# Patient Record
Sex: FEMALE | Race: WHITE | NOT HISPANIC OR LATINO | Employment: OTHER | URBAN - METROPOLITAN AREA
[De-identification: names, ages, dates, MRNs, and addresses within clinical notes are randomized per-mention and may not be internally consistent; named-entity substitution may affect disease eponyms.]

---

## 2021-04-08 DIAGNOSIS — Z23 ENCOUNTER FOR IMMUNIZATION: ICD-10-CM

## 2024-03-01 ENCOUNTER — OFFICE VISIT (OUTPATIENT)
Dept: URGENT CARE | Facility: CLINIC | Age: 74
End: 2024-03-01
Payer: COMMERCIAL

## 2024-03-01 VITALS
WEIGHT: 234 LBS | HEART RATE: 71 BPM | BODY MASS INDEX: 37.61 KG/M2 | TEMPERATURE: 97.7 F | OXYGEN SATURATION: 97 % | RESPIRATION RATE: 22 BRPM | SYSTOLIC BLOOD PRESSURE: 128 MMHG | DIASTOLIC BLOOD PRESSURE: 67 MMHG | HEIGHT: 66 IN

## 2024-03-01 DIAGNOSIS — H66.91 RIGHT OTITIS MEDIA, UNSPECIFIED OTITIS MEDIA TYPE: ICD-10-CM

## 2024-03-01 DIAGNOSIS — H69.91 DISORDER OF RIGHT EUSTACHIAN TUBE: ICD-10-CM

## 2024-03-01 DIAGNOSIS — J01.00 ACUTE MAXILLARY SINUSITIS, RECURRENCE NOT SPECIFIED: Primary | ICD-10-CM

## 2024-03-01 PROCEDURE — G0382 LEV 3 HOSP TYPE B ED VISIT: HCPCS | Performed by: PHYSICIAN ASSISTANT

## 2024-03-01 RX ORDER — CALCIUM CARBONATE/VITAMIN D3 500MG-5MCG
1 TABLET ORAL
COMMUNITY

## 2024-03-01 RX ORDER — GLUCOSAMINE HCL 500 MG
100 TABLET ORAL DAILY
COMMUNITY

## 2024-03-01 RX ORDER — OMEGA-3S/DHA/EPA/FISH OIL 300-1000MG
1 CAPSULE ORAL DAILY
COMMUNITY

## 2024-03-01 RX ORDER — ASENAPINE 10 MG/1
TABLET SUBLINGUAL
COMMUNITY

## 2024-03-01 RX ORDER — PRAVASTATIN SODIUM 20 MG
20 TABLET ORAL
COMMUNITY
Start: 2024-01-03

## 2024-03-01 RX ORDER — ALIROCUMAB 75 MG/ML
75 INJECTION, SOLUTION SUBCUTANEOUS
COMMUNITY
Start: 2023-11-29

## 2024-03-01 RX ORDER — EZETIMIBE 10 MG/1
10 TABLET ORAL DAILY
COMMUNITY
Start: 2023-09-07

## 2024-03-01 RX ORDER — PREDNISONE 10 MG/1
TABLET ORAL
Qty: 20 TABLET | Refills: 0 | Status: SHIPPED | OUTPATIENT
Start: 2024-03-01

## 2024-03-01 RX ORDER — ATENOLOL 25 MG/1
25 TABLET ORAL EVERY MORNING
COMMUNITY

## 2024-03-01 RX ORDER — AMOXICILLIN 500 MG/1
500 CAPSULE ORAL EVERY 8 HOURS SCHEDULED
Qty: 30 CAPSULE | Refills: 0 | Status: SHIPPED | OUTPATIENT
Start: 2024-03-01 | End: 2024-03-11

## 2024-03-01 RX ORDER — RIVAROXABAN 10 MG/1
10 TABLET, FILM COATED ORAL
COMMUNITY
Start: 2024-01-21

## 2024-03-01 NOTE — PROGRESS NOTES
"[facility]        NAME: Karly Helton is a 73 y.o. female  : 1950    MRN: [id number]  DATE: 2024  TIME: 10:29 AM    /67   Pulse 71   Temp 97.7 °F (36.5 °C)   Resp 22   Ht 5' 5.5\" (1.664 m)   Wt 106 kg (234 lb)   SpO2 97%   BMI 38.35 kg/m²     Assessment and Plan   Acute maxillary sinusitis, recurrence not specified [J01.00]  1. Acute maxillary sinusitis, recurrence not specified  amoxicillin (AMOXIL) 500 mg capsule    predniSONE 10 mg tablet      2. Right otitis media, unspecified otitis media type  amoxicillin (AMOXIL) 500 mg capsule    predniSONE 10 mg tablet      3. Disorder of right eustachian tube  amoxicillin (AMOXIL) 500 mg capsule    predniSONE 10 mg tablet            Patient Instructions       Follow up with PCP in 3-5 days.  Proceed to  ER if symptoms worsen.    Chief Complaint     Chief Complaint   Patient presents with    Ear Problem     Blocked right ear for about 1.5 weeks ago. [facility]. Reporting recent cold symptoms (cough and mucus).         History of Present Illness       Pt with right ear clogged x 1 week  decreased hearing pt with sinus congestion         Review of Systems   Review of Systems   Constitutional: Negative.    HENT: Negative.     Eyes: Negative.    Respiratory: Negative.     Cardiovascular: Negative.    Gastrointestinal: Negative.    Endocrine: Negative.    Genitourinary: Negative.    Musculoskeletal: Negative.    Skin: Negative.    Allergic/Immunologic: Negative.    Neurological: Negative.    Hematological: Negative.    Psychiatric/Behavioral: Negative.     All other systems reviewed and are negative.        Current Medications       Current Outpatient Medications:     amoxicillin (AMOXIL) 500 mg capsule, Take 1 capsule (500 mg total) by mouth every 8 (eight) hours for 10 days, Disp: 30 capsule, Rfl: 0    asenapine (SAPHRIS) 10 mg SL tablet, Place under the tongue, Disp: , Rfl:     atenolol (TENORMIN) 25 mg tablet, " "Take 25 mg by mouth every morning, Disp: , Rfl:     Bioflavonoid Products (Vitamin C) CHEW, Chew, Disp: , Rfl:     Calcium Carb-Cholecalciferol (Calcium 500 + D) 500-5 MG-MCG TABS, Take 1 tablet by mouth, Disp: , Rfl:     Cholecalciferol (Vitamin D3) 25 MCG (1000 UT) capsule, Take 4,000 Units by mouth daily, Disp: , Rfl:     Coenzyme Q10 100 MG TABS, Take 100 mg by mouth daily, Disp: , Rfl:     ezetimibe (ZETIA) 10 mg tablet, Take 10 mg by mouth daily, Disp: , Rfl:     Omega-3 Fatty Acids (Omega-3 Fish Oil) 300 MG CAPS, Take 1 tablet by mouth daily, Disp: , Rfl:     Praluent 75 MG/ML SOAJ, Inject 75 mg under the skin, Disp: , Rfl:     pravastatin (PRAVACHOL) 20 mg tablet, Take 20 mg by mouth, Disp: , Rfl:     predniSONE 10 mg tablet, 4 tabs po qd x 2 days then 3 tabs po qd x 2 days then 2 tabs po qd x 2 days then 1 tab po qd x 2 days, Disp: 20 tablet, Rfl: 0    Xarelto 10 MG tablet, Take 10 mg by mouth daily with dinner, Disp: , Rfl:     Current Allergies     Allergies as of 03/01/2024 - Reviewed 03/01/2024   Allergen Reaction Noted    Atorvastatin Arthralgia, Myalgia, and Rash 07/26/2016    Ezetimibe-simvastatin Diarrhea, GI Intolerance, and Vomiting 04/11/2013            The following portions of the patient's history were reviewed and updated as appropriate: allergies, current medications, past family history, past medical history, past social history, past surgical history and problem list.     Past Medical History:   Diagnosis Date    Blood clotting disorder (HCC)     History of pulmonary embolus (PE)     Hypertension        Past Surgical History:   Procedure Laterality Date    HIP SURGERY         History reviewed. No pertinent family history.      Medications have been verified.        Objective   /67   Pulse 71   Temp 97.7 °F (36.5 °C)   Resp 22   Ht 5' 5.5\" (1.664 m)   Wt 106 kg (234 lb)   SpO2 97%   BMI 38.35 kg/m²        Physical Exam     Physical Exam  Vitals and nursing note reviewed. "   Constitutional:       Appearance: Normal appearance. She is normal weight.   HENT:      Head: Normocephalic and atraumatic.      Right Ear: Ear canal and external ear normal.      Left Ear: Tympanic membrane, ear canal and external ear normal.      Ears:      Comments: Right tm injected and cloudy      Nose: Congestion and rhinorrhea present.   Eyes:      Extraocular Movements: Extraocular movements intact.      Conjunctiva/sclera: Conjunctivae normal.      Pupils: Pupils are equal, round, and reactive to light.   Cardiovascular:      Rate and Rhythm: Normal rate and regular rhythm.      Pulses: Normal pulses.      Heart sounds: Normal heart sounds.   Pulmonary:      Effort: Pulmonary effort is normal.      Breath sounds: Normal breath sounds.   Abdominal:      Palpations: Abdomen is soft.   Musculoskeletal:         General: Normal range of motion.      Cervical back: Normal range of motion and neck supple.   Skin:     General: Skin is warm.   Neurological:      Mental Status: She is alert and oriented to person, place, and time.

## 2024-04-20 ENCOUNTER — HOSPITAL ENCOUNTER (INPATIENT)
Facility: HOSPITAL | Age: 74
LOS: 6 days | Discharge: NURSING, REHAB OR LONG-TERM CARE | DRG: 493 | End: 2024-04-26
Attending: STUDENT IN AN ORGANIZED HEALTH CARE EDUCATION/TRAINING PROGRAM | Admitting: FAMILY MEDICINE
Payer: COMMERCIAL

## 2024-04-20 ENCOUNTER — APPOINTMENT (EMERGENCY)
Dept: RADIOLOGY | Facility: HOSPITAL | Age: 74
DRG: 493 | End: 2024-04-20
Payer: COMMERCIAL

## 2024-04-20 DIAGNOSIS — S82.852A CLOSED TRIMALLEOLAR FRACTURE OF LEFT ANKLE, INITIAL ENCOUNTER: Primary | ICD-10-CM

## 2024-04-20 DIAGNOSIS — R26.2 AMBULATORY DYSFUNCTION: ICD-10-CM

## 2024-04-20 PROBLEM — I10 ESSENTIAL HYPERTENSION: Status: ACTIVE | Noted: 2024-04-20

## 2024-04-20 PROBLEM — S82.892D CLOSED FRACTURE OF LEFT ANKLE WITH ROUTINE HEALING: Status: ACTIVE | Noted: 2024-04-20

## 2024-04-20 PROBLEM — E78.2 MIXED HYPERLIPIDEMIA: Status: ACTIVE | Noted: 2024-04-20

## 2024-04-20 PROBLEM — D68.52 PROTHROMBIN GENE MUTATION (HCC): Status: ACTIVE | Noted: 2024-04-20

## 2024-04-20 LAB
ANION GAP SERPL CALCULATED.3IONS-SCNC: 7 MMOL/L (ref 4–13)
BASOPHILS # BLD AUTO: 0.07 THOUSANDS/ÂΜL (ref 0–0.1)
BASOPHILS NFR BLD AUTO: 1 % (ref 0–1)
BUN SERPL-MCNC: 29 MG/DL (ref 5–25)
CALCIUM SERPL-MCNC: 9.3 MG/DL (ref 8.4–10.2)
CHLORIDE SERPL-SCNC: 108 MMOL/L (ref 96–108)
CO2 SERPL-SCNC: 25 MMOL/L (ref 21–32)
CREAT SERPL-MCNC: 1.2 MG/DL (ref 0.6–1.3)
EOSINOPHIL # BLD AUTO: 0.13 THOUSAND/ÂΜL (ref 0–0.61)
EOSINOPHIL NFR BLD AUTO: 2 % (ref 0–6)
ERYTHROCYTE [DISTWIDTH] IN BLOOD BY AUTOMATED COUNT: 12 % (ref 11.6–15.1)
GFR SERPL CREATININE-BSD FRML MDRD: 44 ML/MIN/1.73SQ M
GLUCOSE SERPL-MCNC: 114 MG/DL (ref 65–140)
HCT VFR BLD AUTO: 40.6 % (ref 34.8–46.1)
HGB BLD-MCNC: 13.3 G/DL (ref 11.5–15.4)
IMM GRANULOCYTES # BLD AUTO: 0.02 THOUSAND/UL (ref 0–0.2)
IMM GRANULOCYTES NFR BLD AUTO: 0 % (ref 0–2)
INR PPP: 0.97 (ref 0.84–1.19)
LYMPHOCYTES # BLD AUTO: 1.93 THOUSANDS/ÂΜL (ref 0.6–4.47)
LYMPHOCYTES NFR BLD AUTO: 24 % (ref 14–44)
MCH RBC QN AUTO: 31.7 PG (ref 26.8–34.3)
MCHC RBC AUTO-ENTMCNC: 32.8 G/DL (ref 31.4–37.4)
MCV RBC AUTO: 97 FL (ref 82–98)
MONOCYTES # BLD AUTO: 0.77 THOUSAND/ÂΜL (ref 0.17–1.22)
MONOCYTES NFR BLD AUTO: 10 % (ref 4–12)
NEUTROPHILS # BLD AUTO: 5.19 THOUSANDS/ÂΜL (ref 1.85–7.62)
NEUTS SEG NFR BLD AUTO: 63 % (ref 43–75)
NRBC BLD AUTO-RTO: 0 /100 WBCS
PLATELET # BLD AUTO: 185 THOUSANDS/UL (ref 149–390)
PMV BLD AUTO: 10.5 FL (ref 8.9–12.7)
POTASSIUM SERPL-SCNC: 3.9 MMOL/L (ref 3.5–5.3)
PROTHROMBIN TIME: 13.2 SECONDS (ref 11.6–14.5)
RBC # BLD AUTO: 4.19 MILLION/UL (ref 3.81–5.12)
SODIUM SERPL-SCNC: 140 MMOL/L (ref 135–147)
WBC # BLD AUTO: 8.11 THOUSAND/UL (ref 4.31–10.16)

## 2024-04-20 PROCEDURE — 1123F ACP DISCUSS/DSCN MKR DOCD: CPT | Performed by: STUDENT IN AN ORGANIZED HEALTH CARE EDUCATION/TRAINING PROGRAM

## 2024-04-20 PROCEDURE — 80048 BASIC METABOLIC PNL TOTAL CA: CPT | Performed by: STUDENT IN AN ORGANIZED HEALTH CARE EDUCATION/TRAINING PROGRAM

## 2024-04-20 PROCEDURE — 29515 APPLICATION SHORT LEG SPLINT: CPT | Performed by: STUDENT IN AN ORGANIZED HEALTH CARE EDUCATION/TRAINING PROGRAM

## 2024-04-20 PROCEDURE — 36415 COLL VENOUS BLD VENIPUNCTURE: CPT | Performed by: STUDENT IN AN ORGANIZED HEALTH CARE EDUCATION/TRAINING PROGRAM

## 2024-04-20 PROCEDURE — 99222 1ST HOSP IP/OBS MODERATE 55: CPT | Performed by: FAMILY MEDICINE

## 2024-04-20 PROCEDURE — 85610 PROTHROMBIN TIME: CPT | Performed by: STUDENT IN AN ORGANIZED HEALTH CARE EDUCATION/TRAINING PROGRAM

## 2024-04-20 PROCEDURE — 73610 X-RAY EXAM OF ANKLE: CPT

## 2024-04-20 PROCEDURE — 85025 COMPLETE CBC W/AUTO DIFF WBC: CPT | Performed by: STUDENT IN AN ORGANIZED HEALTH CARE EDUCATION/TRAINING PROGRAM

## 2024-04-20 PROCEDURE — 99285 EMERGENCY DEPT VISIT HI MDM: CPT | Performed by: STUDENT IN AN ORGANIZED HEALTH CARE EDUCATION/TRAINING PROGRAM

## 2024-04-20 PROCEDURE — 99284 EMERGENCY DEPT VISIT MOD MDM: CPT

## 2024-04-20 PROCEDURE — 96374 THER/PROPH/DIAG INJ IV PUSH: CPT

## 2024-04-20 RX ORDER — MORPHINE SULFATE 4 MG/ML
4 INJECTION, SOLUTION INTRAMUSCULAR; INTRAVENOUS ONCE
Status: COMPLETED | OUTPATIENT
Start: 2024-04-20 | End: 2024-04-20

## 2024-04-20 RX ORDER — ATENOLOL 25 MG/1
25 TABLET ORAL EVERY MORNING
Status: DISCONTINUED | OUTPATIENT
Start: 2024-04-21 | End: 2024-04-26 | Stop reason: HOSPADM

## 2024-04-20 RX ORDER — ACETAMINOPHEN 325 MG/1
975 TABLET ORAL ONCE
Status: COMPLETED | OUTPATIENT
Start: 2024-04-20 | End: 2024-04-20

## 2024-04-20 RX ORDER — EZETIMIBE 10 MG/1
10 TABLET ORAL DAILY
Status: DISCONTINUED | OUTPATIENT
Start: 2024-04-21 | End: 2024-04-26 | Stop reason: HOSPADM

## 2024-04-20 RX ORDER — ONDANSETRON 2 MG/ML
4 INJECTION INTRAMUSCULAR; INTRAVENOUS EVERY 6 HOURS PRN
Status: DISCONTINUED | OUTPATIENT
Start: 2024-04-20 | End: 2024-04-26 | Stop reason: HOSPADM

## 2024-04-20 RX ORDER — OXYCODONE HYDROCHLORIDE 5 MG/1
5 TABLET ORAL EVERY 6 HOURS PRN
Status: DISCONTINUED | OUTPATIENT
Start: 2024-04-20 | End: 2024-04-26 | Stop reason: HOSPADM

## 2024-04-20 RX ORDER — ACETAMINOPHEN 325 MG/1
650 TABLET ORAL EVERY 6 HOURS PRN
Status: DISCONTINUED | OUTPATIENT
Start: 2024-04-20 | End: 2024-04-23

## 2024-04-20 RX ORDER — PRAVASTATIN SODIUM 20 MG
20 TABLET ORAL
Status: DISCONTINUED | OUTPATIENT
Start: 2024-04-20 | End: 2024-04-26 | Stop reason: HOSPADM

## 2024-04-20 RX ADMIN — ACETAMINOPHEN 325MG 650 MG: 325 TABLET ORAL at 20:57

## 2024-04-20 RX ADMIN — ACETAMINOPHEN 325MG 975 MG: 325 TABLET ORAL at 14:40

## 2024-04-20 RX ADMIN — ONDANSETRON 4 MG: 2 INJECTION INTRAMUSCULAR; INTRAVENOUS at 17:36

## 2024-04-20 RX ADMIN — MORPHINE SULFATE 4 MG: 4 INJECTION INTRAVENOUS at 15:34

## 2024-04-20 NOTE — PLAN OF CARE
Problem: PAIN - ADULT  Goal: Verbalizes/displays adequate comfort level or baseline comfort level  Description: Interventions:  - Encourage patient to monitor pain and request assistance  - Assess pain using appropriate pain scale  - Administer analgesics based on type and severity of pain and evaluate response  - Implement non-pharmacological measures as appropriate and evaluate response  - Consider cultural and social influences on pain and pain management  - Notify physician/advanced practitioner if interventions unsuccessful or patient reports new pain  Outcome: Progressing     Problem: INFECTION - ADULT  Goal: Absence or prevention of progression during hospitalization  Description: INTERVENTIONS:  - Assess and monitor for signs and symptoms of infection  - Monitor lab/diagnostic results  - Monitor all insertion sites, i.e. indwelling lines, tubes, and drains  - Monitor endotracheal if appropriate and nasal secretions for changes in amount and color  - [place] appropriate cooling/warming therapies per order  - Administer medications as ordered  - Instruct and encourage patient and family to use good hand hygiene technique  - Identify and instruct in appropriate isolation precautions for identified infection/condition  Outcome: Progressing  Goal: Absence of fever/infection during neutropenic period  Description: INTERVENTIONS:  - Monitor WBC    Outcome: Progressing     Problem: SAFETY ADULT  Goal: Patient will remain free of falls  Description: INTERVENTIONS:  - Educate patient/family on patient safety including physical limitations  - Instruct patient to call for assistance with activity   - Consult OT/PT to assist with strengthening/mobility   - Keep Call bell within reach  - Keep bed low and locked with side rails adjusted as appropriate  - Keep care items and personal belongings within reach  - Initiate and maintain comfort rounds  - Make Fall Risk Sign visible to staff  - Offer Toileting every 2 Hours,  in advance of need  - Initiate/Maintain bed/chair alarm  - Apply yellow socks and bracelet for high fall risk patients  - Consider moving patient to room near nurses station  Outcome: Progressing  Goal: Maintain or return to baseline ADL function  Description: INTERVENTIONS:  -  Assess patient's ability to carry out ADLs; assess patient's baseline for ADL function and identify physical deficits which impact ability to perform ADLs (bathing, care of mouth/teeth, toileting, grooming, dressing, etc.)  - Assess/evaluate cause of self-care deficits   - Assess range of motion  - Assess patient's mobility; develop plan if impaired  - Assess patient's need for assistive devices and provide as appropriate  - Encourage maximum independence but intervene and supervise when necessary  - Involve family in performance of ADLs  - Assess for home care needs following discharge   - Consider OT consult to assist with ADL evaluation and planning for discharge  - Provide patient education as appropriate  Outcome: Progressing  Goal: Maintains/Returns to pre admission functional level  Description: INTERVENTIONS:  - Perform AM-PAC 6 Click Basic Mobility/ Daily Activity assessment daily.  - Set and communicate daily mobility goal to care team and patient/family/caregiver.   - Collaborate with rehabilitation services on mobility goals if consulted  - Out of bed for toileting  - Record patient progress and toleration of activity level   Outcome: Progressing     Problem: DISCHARGE PLANNING  Goal: Discharge to home or other facility with appropriate resources  Description: INTERVENTIONS:  - Identify barriers to discharge w/patient and caregiver  - Arrange for needed discharge resources and transportation as appropriate  - Identify discharge learning needs (meds, wound care, etc.)  - Arrange for interpretive services to assist at discharge as needed  - Refer to Case Management Department for coordinating discharge planning if the patient needs  post-hospital services based on physician/advanced practitioner order or complex needs related to functional status, cognitive ability, or social support system  Outcome: Progressing     Problem: Knowledge Deficit  Goal: Patient/family/caregiver demonstrates understanding of disease process, treatment plan, medications, and discharge instructions  Description: Complete learning assessment and assess knowledge base.  Interventions:  - Provide teaching at level of understanding  - Provide teaching via preferred learning methods  Outcome: Progressing     Problem: CARDIOVASCULAR - ADULT  Goal: Maintains optimal cardiac output and hemodynamic stability  Description: INTERVENTIONS:  - Monitor I/O, vital signs and rhythm  - Monitor for S/S and trends of decreased cardiac output  - Administer and titrate ordered vasoactive medications to optimize hemodynamic stability  - Assess quality of pulses, skin color and temperature  - Assess for signs of decreased coronary artery perfusion  - Instruct patient to report change in severity of symptoms  Outcome: Progressing  Goal: Absence of cardiac dysrhythmias or at baseline rhythm  Description: INTERVENTIONS:  - Continuous cardiac monitoring, vital signs, obtain 12 lead EKG if ordered  - Administer antiarrhythmic and heart rate control medications as ordered  - Monitor electrolytes and administer replacement therapy as ordered  Outcome: Progressing     Problem: MUSCULOSKELETAL - ADULT  Goal: Maintain or return mobility to safest level of function  Description: INTERVENTIONS:  - Assess patient's ability to carry out ADLs; assess patient's baseline for ADL function and identify physical deficits which impact ability to perform ADLs (bathing, care of mouth/teeth, toileting, grooming, dressing, etc.)  - Assess/evaluate cause of self-care deficits   - Assess range of motion  - Assess patient's mobility  - Assess patient's need for assistive devices and provide as appropriate  - Encourage  maximum independence but intervene and supervise when necessary  - Involve family in performance of ADLs  - Assess for home care needs following discharge   - Consider OT consult to assist with ADL evaluation and planning for discharge  - Provide patient education as appropriate  Outcome: Progressing  Goal: Maintain proper alignment of affected body part  Description: INTERVENTIONS:  - Support, maintain and protect limb and body alignment  - Provide patient/ family with appropriate education  Outcome: Progressing

## 2024-04-20 NOTE — ED PROVIDER NOTES
History  Chief Complaint   Patient presents with    Ankle Injury     Pt states twisted her left ankle on the last step and fell on hands and knees on grass. Denies loc or hitting head. On xaralto.        History provided by:  Patient and relative  Ankle Injury  Location:  Left ankle  Quality:  Throbbing  Severity:  Moderate  Onset quality:  Sudden  Duration:  1 hour  Timing:  Constant  Progression:  Worsening  Chronicity:  New  Context:  S/p left ankle injury approximately one hour ago. Missed the last step. Denies all other injuires.  Relieved by:  Nothing tried  Worsened by:  Ambulating, bearing weight  Associated symptoms: no abdominal pain, no chest pain, no fatigue, no headaches, no loss of consciousness, no nausea and no vomiting      Prior to Admission Medications   Prescriptions Last Dose Informant Patient Reported? Taking?   Bioflavonoid Products (Vitamin C) CHEW   Yes No   Sig: Chew   Calcium Carb-Cholecalciferol (Calcium 500 + D) 500-5 MG-MCG TABS   Yes No   Sig: Take 1 tablet by mouth   Cholecalciferol (Vitamin D3) 25 MCG (1000 UT) capsule   Yes No   Sig: Take 4,000 Units by mouth daily   Coenzyme Q10 100 MG TABS   Yes No   Sig: Take 100 mg by mouth daily   Omega-3 Fatty Acids (Omega-3 Fish Oil) 300 MG CAPS   Yes No   Sig: Take 1 tablet by mouth daily   Praluent 75 MG/ML SOAJ   Yes No   Sig: Inject 75 mg under the skin   Xarelto 10 MG tablet   Yes No   Sig: Take 10 mg by mouth daily with dinner   asenapine (SAPHRIS) 10 mg SL tablet   Yes No   Sig: Place under the tongue   atenolol (TENORMIN) 25 mg tablet   Yes No   Sig: Take 25 mg by mouth every morning   ezetimibe (ZETIA) 10 mg tablet   Yes No   Sig: Take 10 mg by mouth daily   pravastatin (PRAVACHOL) 20 mg tablet   Yes No   Sig: Take 20 mg by mouth   predniSONE 10 mg tablet   No No   Si tabs po qd x 2 days then 3 tabs po qd x 2 days then 2 tabs po qd x 2 days then 1 tab po qd x 2 days      Facility-Administered Medications: None       Past Medical  History:   Diagnosis Date    Blood clotting disorder (HCC)     History of pulmonary embolus (PE)     Hypertension        Past Surgical History:   Procedure Laterality Date    HIP SURGERY         Family History   Problem Relation Age of Onset    Hypertension Father      I have reviewed and agree with the history as documented.    E-Cigarette/Vaping     E-Cigarette/Vaping Substances     Social History     Tobacco Use    Smoking status: Never    Smokeless tobacco: Never   Substance Use Topics    Alcohol use: Not Currently    Drug use: Not Currently       Review of Systems   Constitutional:  Negative for fatigue.   Cardiovascular:  Negative for chest pain.   Gastrointestinal:  Negative for abdominal pain, nausea and vomiting.   Musculoskeletal:  Positive for arthralgias, gait problem and joint swelling. Negative for back pain.   Skin:  Positive for color change and wound. Negative for pallor.   Neurological:  Negative for loss of consciousness and headaches.   Hematological:  Bruises/bleeds easily.   All other systems reviewed and are negative.      Physical Exam  Physical Exam  Vitals and nursing note reviewed. Exam conducted with a chaperone present (Patient's daughter is present at bedside.).   Constitutional:       General: She is in acute distress.      Appearance: She is not ill-appearing or toxic-appearing.   HENT:      Head: Normocephalic and atraumatic.      Right Ear: External ear normal.      Left Ear: External ear normal.   Eyes:      General: No scleral icterus.        Right eye: No discharge.         Left eye: No discharge.      Extraocular Movements: Extraocular movements intact.      Conjunctiva/sclera: Conjunctivae normal.   Cardiovascular:      Rate and Rhythm: Normal rate and regular rhythm.      Pulses: Normal pulses.      Heart sounds: Normal heart sounds. No murmur heard.  Pulmonary:      Effort: Pulmonary effort is normal. No respiratory distress.      Breath sounds: Normal breath sounds. No  stridor. No wheezing, rhonchi or rales.   Chest:      Chest wall: No tenderness.   Abdominal:      General: Bowel sounds are normal.      Palpations: Abdomen is soft.      Tenderness: There is no abdominal tenderness. There is no guarding or rebound.   Musculoskeletal:         General: Swelling, tenderness, deformity and signs of injury present.      Cervical back: Neck supple. No tenderness.      Right lower leg: No edema.      Left lower leg: No edema.      Comments: Tenderness to palpation along the medial/lateral malleoli.  There is diffuse swelling of the left ankle with mild bruising.  Close deformity noted.  Dorsalis pedis pulse palpated.  Able to wiggle toes.  Distal sensation is intact.  No tenderness to palpation along the cervical/thoracic/lumbar spines.  No tenderness with AP/lateral compression of the pelvis.   Skin:     General: Skin is warm and dry.      Findings: Bruising present. No erythema.   Neurological:      General: No focal deficit present.      Mental Status: She is alert and oriented to person, place, and time.   Psychiatric:         Mood and Affect: Mood normal.         Behavior: Behavior normal.         Vital Signs  ED Triage Vitals [04/20/24 1425]   Temperature Pulse Respirations Blood Pressure SpO2   98 °F (36.7 °C) 72 18 (!) 184/80 95 %      Temp src Heart Rate Source Patient Position - Orthostatic VS BP Location FiO2 (%)   -- -- -- -- --      Pain Score       5           Vitals:    04/20/24 1425 04/20/24 1650   BP: (!) 184/80 167/86   Pulse: 72 61         Visual Acuity      ED Medications  Medications   atenolol (TENORMIN) tablet 25 mg (has no administration in time range)   ezetimibe (ZETIA) tablet 10 mg (has no administration in time range)   pravastatin (PRAVACHOL) tablet 20 mg (has no administration in time range)   acetaminophen (TYLENOL) tablet 650 mg (has no administration in time range)   ondansetron (ZOFRAN) injection 4 mg (has no administration in time range)   oxyCODONE  (ROXICODONE) IR tablet 5 mg (has no administration in time range)   morphine injection 2 mg (has no administration in time range)   acetaminophen (TYLENOL) tablet 975 mg (975 mg Oral Given 4/20/24 1440)   morphine injection 4 mg (4 mg Intravenous Given 4/20/24 1534)       Diagnostic Studies  Results Reviewed       Procedure Component Value Units Date/Time    Basic metabolic panel [[id number]]  (Abnormal) Collected: 04/20/24 1530    Lab Status: Final result Specimen: Blood from Arm, Right Updated: 04/20/24 1553     Sodium 140 mmol/L      Potassium 3.9 mmol/L      Chloride 108 mmol/L      CO2 25 mmol/L      ANION GAP 7 mmol/L      BUN 29 mg/dL      Creatinine 1.20 mg/dL      Glucose 114 mg/dL      Calcium 9.3 mg/dL      eGFR 44 ml/min/1.73sq m     Narrative:      National Kid[employer] Disease Foundation guidelines for Chronic Kidney Disease (CKD):     Stage 1 with normal or high GFR (GFR > 90 mL/min/1.73 square meters)    Stage 2 Mild CKD (GFR = 60-89 mL/min/1.73 square meters)    Stage 3A Moderate CKD (GFR = 45-59 mL/min/1.73 square meters)    Stage 3B Moderate CKD (GFR = 30-44 mL/min/1.73 square meters)    Stage 4 Severe CKD (GFR = 15-29 mL/min/1.73 square meters)    Stage 5 End Stage CKD (GFR <15 mL/min/1.73 square meters)  Note: GFR calculation is accurate only with a steady state creatinine    Protime-INR [[id number]]  (Normal) Collected: 04/20/24 1530    Lab Status: Final result Specimen: Blood from Arm, Right Updated: 04/20/24 1544     Protime 13.2 seconds      INR 0.97    CBC and differential [[id number]] Collected: 04/20/24 1530    Lab Status: Final result Specimen: Blood from Arm, Right Updated: 04/20/24 1535     WBC 8.11 Thousand/uL      RBC 4.19 Million/uL      Hemoglobin 13.3 g/dL      Hematocrit 40.6 %      MCV 97 fL      MCH 31.7 pg      MCHC 32.8 g/dL      RDW 12.0 %      MPV 10.5 fL      Platelets 185 Thousands/uL      nRBC 0 /100 WBCs      Segmented % 63 %      Immature Grans % 0 %      Lymphocytes % 24 %       Monocytes % 10 %      Eosinophils Relative 2 %      Basophils Relative 1 %      Absolute Neutrophils 5.19 Thousands/µL      Absolute Immature Grans 0.02 Thousand/uL      Absolute Lymphocytes 1.93 Thousands/µL      Absolute Monocytes 0.77 Thousand/µL      Eosinophils Absolute 0.13 Thousand/µL      Basophils Absolute 0.07 Thousands/µL                    XR ankle 3+ views LEFT   ED Interpretation by John Paul Evans DO (04/20 1602)   Trimalleolar fracture                 Procedures  Splint application    Date/Time: 4/20/2024 3:50 PM    Performed by: John Paul Evans DO  Authorized by: John Paul Evans DO  [place] Protocol:  Procedure performed by: (uli John)  Consent: Verbal consent obtained.  Consent given by: patient  Patient identity confirmed: verbally with patient    Pre-procedure details:     Sensation:  Normal  Procedure details:     Laterality:  Left    Location:  Ankle    Ankle:  L ankle    Splint type:  Short leg    Supplies:  Cotton padding, Ortho-Glass and elastic bandage  Post-procedure details:     Pain:  Unchanged    Sensation:  Normal    Patient tolerance of procedure:  Tolerated well, no immediate complications    ED Course  ED Course as of 04/20/24 1651   Sat Apr 20, 2024   1455 Vital signs reviewed. S/p fall approximately one hours PTA. Closed deformity noted. DP pulse palpated. Able to wiggle toes, sensation intact.    1457 X-rays interpreted by me.  Signs of left trimalleolar fracture.    1458 Discussed the case with on-call orthopedic surgeon, Dr. Cano. Recommending posterior splint if possible, OP follow up.    1553 Patient states that she will not be able to be discharged to home safely. IV morphine administered. Posterior short leg splint applied. Will contact [facility] for admission.    1604 Admission accepted by Dr. Hayes, [facility] medicine. Orthopedics aware that the patient will be admitted.                                SBIRT 20yo+      Flowsheet Row Most Recent Value   Initial  Alcohol Screen: US AUDIT-C     1. How often do you have a drink containing alcohol? 0 Filed at: 04/20/2024 1426   2. How many drinks containing alcohol do you have on a typical day you are drinking?  0 Filed at: 04/20/2024 1426   3b. FEMALE Any Age, or MALE 65+: How often do you have 4 or more drinks on one occassion? 0 Filed at: 04/20/2024 1426   Audit-C Score 0 Filed at: 04/20/2024 1426   PORFIRIO: How many times in the past year have you...    Used an illegal drug or used a prescription medication for non-medical reasons? Never Filed at: 04/20/2024 1426                      Medical Decision Making  This patient presents with left ankle swelling/pain, ambulatory dysfunction.   Diagnostic considerations include bimalleolar fracture, trimalleolar fracture, ankle dislocation, Ma[place]neuve fracture. See ED Course.         Problems Addressed:  Ambulatory dysfunction: acute illness or injury  Closed trimalleolar fracture of left ankle, initial encounter: acute illness or injury    Amount and/or Complexity of Data Reviewed  Labs: ordered. Decision-making details documented in ED Course.  Radiology: ordered and independent interpretation performed. Decision-making details documented in ED Course.  Discussion of management or test interpretation with external provider(s): The case and treatment plan were discussed with orthopedics, hospital medicine.    Risk  OTC drugs.  Prescription drug management.  Parenteral controlled substances.  Decision regarding hospitalization.      Disposition  Final diagnoses:   Closed trimalleolar fracture of left ankle, initial encounter   Ambulatory dysfunction     Time reflects when diagnosis was documented in both MDM as applicable and the Disposition within this note       Time User Action Codes Description Comment    4/20/2024  4:05 PM John Paul Evans Add [S82.852A] Closed trimalleolar fracture of left ankle, initial encounter     4/20/2024  4:05 PM John Paul Evans Add [R26.2] Ambulatory  dysfunction           ED Disposition       ED Disposition   Admit    Condition   Stable    Date/Time   Sat Apr 20, 2024 0895    Comment   Case was discussed with Dr. Hayes and the patient's admission status was agreed to be Admission Status: inpatient status to the service of Dr. Hayes .               Follow-up Information    None         Current Discharge Medication List        CONTINUE these medications which have NOT CHANGED    Details   asenapine (SAPHRIS) 10 mg SL tablet Place under the tongue      atenolol (TENORMIN) 25 mg tablet Take 25 mg by mouth every morning      Bioflavonoid Products (Vitamin C) CHEW Chew      Calcium Carb-Cholecalciferol (Calcium 500 + D) 500-5 MG-MCG TABS Take 1 tablet by mouth      Cholecalciferol (Vitamin D3) 25 MCG (1000 UT) capsule Take 4,000 Units by mouth daily      Coenzyme Q10 100 MG TABS Take 100 mg by mouth daily      ezetimibe (ZETIA) 10 mg tablet Take 10 mg by mouth daily      Omega-3 Fatty Acids (Omega-3 Fish Oil) 300 MG CAPS Take 1 tablet by mouth daily      Praluent 75 MG/ML SOAJ Inject 75 mg under the skin      pravastatin (PRAVACHOL) 20 mg tablet Take 20 mg by mouth      predniSONE 10 mg tablet 4 tabs po qd x 2 days then 3 tabs po qd x 2 days then 2 tabs po qd x 2 days then 1 tab po qd x 2 days  Qty: 20 tablet, Refills: 0    Associated Diagnoses: Acute maxillary sinusitis, recurrence not specified; Right otitis media, unspecified otitis media type; Disorder of right eustachian tube      Xarelto 10 MG tablet Take 10 mg by mouth daily with dinner             No discharge procedures on file.    PDMP Review       None            ED Provider  Electronically Signed by             John Paul Evans DO  04/20/24 9751

## 2024-04-20 NOTE — H&P
[facility]  H&P  Name: Karly Helton 73 y.o. female I MRN: [id number]  Unit/Bed#: -01 I Date of Admission: 4/20/2024   Date of Service: 4/20/2024 I Hospital Day: 0      Assessment/Plan   * Closed fracture of left ankle with routine healing  Assessment & Plan  She missed a step while going down her front porch.she had mechanical fall and fell sideways and felt pain in her left ankle.  Unable to stand afterwards despite help.  Patient was complaining of 8 x 10 pain earlier but now it is hard to get better with pain meds.  xray shows trimalleolar fracture of the left ankle.  Consult Ortho  Xarelto on hold.  Anticipate surgery after 48 hours new pain control.  No DVT prophylaxis as Xarelto was previously being used and is currently on hold.    Mixed hyperlipidemia  Assessment & Plan  Continue statin therapy    Essential hypertension  Assessment & Plan  Continue atenolol as per home regimen continue pain control    Prothrombin gene mutation (HCC)  Assessment & Plan  History Of DVT in the past.  Known history of prothrombin gene mutation.  Currently on Xarelto 10 mg daily.  On hold for now         VTE Prophylaxis: Pharmacologic VTE Prophylaxis contraindicated due to surgery on Monday   / sequential compression device   Code Status: Full code  POLST: There is no POLST form on file for this patient (pre-hospital)  Discussion with family: None    Anticipated Length of Stay:  Patient will be admitted on an Inpatient basis with an anticipated length of stay of more than 2 midnights.   Justification for Hospital Stay: Ankle closed fracture left    Total Time for Visit, including Counseling / Coordination of Care: 60 minutes.  Greater than 50% of this total time spent on direct patient counseling and coordination of care.    Chief Complaint:   Left ankle pain    History of Present Illness:    Karly Helton is a 73 y.o. female who presents with left ankle pain.  Patient states that she fell  at home where she missed the last step on her front porch and fell sideways and injured her left foot and felt a lot of pain and could not stand up anymore.  Despite passerby's helping her she could not get up.  Continues to complain of 8 x 10 pain unable to ambulate in the ED as well.  Denies any loss of consciousness or head trauma.    Review of Systems:    Review of Systems   Constitutional:  Negative for appetite change, chills, fatigue and fever.   HENT:  Negative for hearing loss, sore throat and trouble swallowing.    Eyes:  Negative for photophobia, discharge and visual disturbance.   Respiratory:  Negative for chest tightness and shortness of breath.    Cardiovascular:  Negative for chest pain and palpitations.   Gastrointestinal:  Negative for abdominal pain, blood in stool and vomiting.   Endocrine: Negative for polydipsia and polyuria.   Genitourinary:  Negative for difficulty urinating, dysuria, flank pain and hematuria.   Musculoskeletal:  Positive for arthralgias and gait problem. Negative for back pain.   Skin:  Negative for rash.   Allergic/Immunologic: Negative for environmental allergies and food allergies.   Neurological:  Negative for dizziness, seizures, syncope and headaches.   Hematological:  Does not bruise/bleed easily.   Psychiatric/Behavioral:  Negative for behavioral problems.    All other systems reviewed and are negative.      Past Medical and Surgical History:     Past Medical History:   Diagnosis Date    Blood clotting disorder (HCC)     History of pulmonary embolus (PE)     Hypertension        Past Surgical History:   Procedure Laterality Date    HIP SURGERY         Meds/Allergies:    Prior to Admission medications    Medication Sig Start Date End Date Taking? Authorizing Provider   asenapine (SAPHRIS) 10 mg SL tablet Place 10 mg under the tongue in the morning    Historical Provider, MD   atenolol (TENORMIN) 25 mg tablet Take 25 mg by mouth every morning    Historical Provider, MD    Bioflavonoid Products (Vitamin C) CHEW Chew 1 tablet daily    Historical Provider, MD   Calcium Carb-Cholecalciferol (Calcium 500 + D) 500-5 MG-MCG TABS Take 1 tablet by mouth in the morning    Historical Provider, MD   Cholecalciferol (Vitamin D3) 25 MCG (1000 UT) capsule Take 4,000 Units by mouth daily    Historical Provider, MD   Coenzyme Q10 100 MG TABS Take 100 mg by mouth daily    Historical Provider, MD   ezetimibe (ZETIA) 10 mg tablet Take 10 mg by mouth daily 9/7/23   Historical Provider, MD   Omega-3 Fatty Acids (Omega-3 Fish Oil) 300 MG CAPS Take 1 tablet by mouth daily    Historical Provider, MD   Praluent 75 MG/ML SOAJ Inject 75 mg under the skin see administration instructions EVERY 2 WEEKS; NEXT DUE FRIDAY 4/26 11/29/23   Historical Provider, MD   pravastatin (PRAVACHOL) 20 mg tablet Take 20 mg by mouth daily 1/3/24   Historical Provider, MD   predniSONE 10 mg tablet 4 tabs po qd x 2 days then 3 tabs po qd x 2 days then 2 tabs po qd x 2 days then 1 tab po qd x 2 days  Patient not taking: Reported on 4/20/2024 3/1/24   Aiden Alfonso Jr., PA-C   Xarelto 10 MG tablet Take 10 mg by mouth daily with dinner 1/21/24   Historical Provider, MD     I have reviewed home medications with patient personally.    Allergies:   Allergies   Allergen Reactions    Atorvastatin Arthralgia, Myalgia and Rash    Ezetimibe-Simvastatin Diarrhea, GI Intolerance and Vomiting       Social History:     Marital Status: /Civil Union     Social History     Substance and Sexual Activity   Alcohol Use Not Currently     Social History     Tobacco Use   Smoking Status Never   Smokeless Tobacco Never     Social History     Substance and Sexual Activity   Drug Use Not Currently       Family History:    Family History   Problem Relation Age of Onset    Hypertension Father        Physical Exam:     Vitals:   Blood Pressure: 167/86 (04/20/24 1650)  Pulse: 61 (04/20/24 1650)  Temperature: (!) 97 °F (36.1 °C) (04/20/24  1650)  Respirations: 18 (04/20/24 1650)  Weight - Scale: 110 kg (241 lb 10 oz) (04/20/24 1425)  SpO2: 96 % (04/20/24 1650)    Physical Exam  Vitals and nursing note reviewed.   Constitutional:       Appearance: Normal appearance.   HENT:      Head: Normocephalic and atraumatic.      Right Ear: External ear normal.      Left Ear: External ear normal.      Nose: Nose normal.      Mouth/Throat:      Pharynx: Oropharynx is clear.   Eyes:      Pupils: Pupils are equal, round, and reactive to light.   Cardiovascular:      Rate and Rhythm: Normal rate and regular rhythm.      Heart sounds: Normal heart sounds.   Pulmonary:      Effort: Pulmonary effort is normal.      Breath sounds: Normal breath sounds.   Abdominal:      General: Bowel sounds are normal.      Palpations: Abdomen is soft.      Tenderness: There is no abdominal tenderness.   Musculoskeletal:      Cervical back: Normal range of motion and neck supple.      Comments: Ankle tenderness left   Skin:     General: Skin is warm and dry.      Capillary Refill: Capillary refill takes less than 2 seconds.   Neurological:      General: No focal deficit present.      Mental Status: She is alert and oriented to person, place, and time.   Psychiatric:         Mood and Affect: Mood normal.           Additional Data:     Lab Results: I have personally reviewed pertinent reports.      Results from last 7 days   Lab Units 04/20/24  1530   WBC Thousand/uL 8.11   HEMOGLOBIN g/dL 13.3   HEMATOCRIT % 40.6   PLATELETS Thousands/uL 185   SEGS PCT % 63   LYMPHO PCT % 24   MONO PCT % 10   EOS PCT % 2     Results from last 7 days   Lab Units 04/20/24  1530   SODIUM mmol/L 140   POTASSIUM mmol/L 3.9   CHLORIDE mmol/L 108   CO2 mmol/L 25   BUN mg/dL 29*   CREATININE mg/dL 1.20   ANION GAP mmol/L 7   CALCIUM mg/dL 9.3   GLUCOSE RANDOM mg/dL 114     Results from last 7 days   Lab Units 04/20/24  1530   INR  0.97                   Imaging: I have personally reviewed pertinent reports.       XR ankle 3+ views LEFT   ED Interpretation by John Paul Evans DO (04/20 1602)   Trimalleolar fracture          EKG, Pathology, and Other Studies Reviewed on Admission:   EKG: Reviewed    Allscripts / Epic Records Reviewed: Yes     ** Please Note: This note has been constructed using a voice recognition system. **

## 2024-04-20 NOTE — ASSESSMENT & PLAN NOTE
History Of DVT in the past.  Known history of prothrombin gene mutation.  Currently on Xarelto 10 mg daily.  On hold for now

## 2024-04-21 LAB
ALBUMIN SERPL BCP-MCNC: 3.7 G/DL (ref 3.5–5)
ALP SERPL-CCNC: 41 U/L (ref 34–104)
ALT SERPL W P-5'-P-CCNC: 13 U/L (ref 7–52)
ANION GAP SERPL CALCULATED.3IONS-SCNC: 6 MMOL/L (ref 4–13)
AST SERPL W P-5'-P-CCNC: 19 U/L (ref 13–39)
BILIRUB SERPL-MCNC: 0.6 MG/DL (ref 0.2–1)
BUN SERPL-MCNC: 24 MG/DL (ref 5–25)
CALCIUM SERPL-MCNC: 9.5 MG/DL (ref 8.4–10.2)
CHLORIDE SERPL-SCNC: 109 MMOL/L (ref 96–108)
CO2 SERPL-SCNC: 25 MMOL/L (ref 21–32)
CREAT SERPL-MCNC: 0.94 MG/DL (ref 0.6–1.3)
ERYTHROCYTE [DISTWIDTH] IN BLOOD BY AUTOMATED COUNT: 12 % (ref 11.6–15.1)
GFR SERPL CREATININE-BSD FRML MDRD: 59 ML/MIN/1.73SQ M
GLUCOSE SERPL-MCNC: 101 MG/DL (ref 65–140)
HCT VFR BLD AUTO: 38.8 % (ref 34.8–46.1)
HGB BLD-MCNC: 12.9 G/DL (ref 11.5–15.4)
INR PPP: 0.98 (ref 0.84–1.19)
MCH RBC QN AUTO: 31.8 PG (ref 26.8–34.3)
MCHC RBC AUTO-ENTMCNC: 33.2 G/DL (ref 31.4–37.4)
MCV RBC AUTO: 96 FL (ref 82–98)
PLATELET # BLD AUTO: 164 THOUSANDS/UL (ref 149–390)
PMV BLD AUTO: 10.5 FL (ref 8.9–12.7)
POTASSIUM SERPL-SCNC: 3.8 MMOL/L (ref 3.5–5.3)
PROT SERPL-MCNC: 6.3 G/DL (ref 6.4–8.4)
PROTHROMBIN TIME: 13.3 SECONDS (ref 11.6–14.5)
RBC # BLD AUTO: 4.06 MILLION/UL (ref 3.81–5.12)
SODIUM SERPL-SCNC: 140 MMOL/L (ref 135–147)
TSH SERPL DL<=0.05 MIU/L-ACNC: 0.93 UIU/ML (ref 0.45–4.5)
WBC # BLD AUTO: 8.51 THOUSAND/UL (ref 4.31–10.16)

## 2024-04-21 PROCEDURE — 84443 ASSAY THYROID STIM HORMONE: CPT | Performed by: FAMILY MEDICINE

## 2024-04-21 PROCEDURE — 80053 COMPREHEN METABOLIC PANEL: CPT | Performed by: FAMILY MEDICINE

## 2024-04-21 PROCEDURE — 85027 COMPLETE CBC AUTOMATED: CPT | Performed by: FAMILY MEDICINE

## 2024-04-21 PROCEDURE — 99232 SBSQ HOSP IP/OBS MODERATE 35: CPT | Performed by: FAMILY MEDICINE

## 2024-04-21 PROCEDURE — 99223 1ST HOSP IP/OBS HIGH 75: CPT | Performed by: ORTHOPAEDIC SURGERY

## 2024-04-21 PROCEDURE — 85610 PROTHROMBIN TIME: CPT | Performed by: FAMILY MEDICINE

## 2024-04-21 RX ORDER — HEPARIN SODIUM 5000 [USP'U]/ML
5000 INJECTION, SOLUTION INTRAVENOUS; SUBCUTANEOUS EVERY 8 HOURS SCHEDULED
Status: DISCONTINUED | OUTPATIENT
Start: 2024-04-21 | End: 2024-04-23

## 2024-04-21 RX ADMIN — ACETAMINOPHEN 325MG 650 MG: 325 TABLET ORAL at 13:42

## 2024-04-21 RX ADMIN — PRAVASTATIN SODIUM 20 MG: 20 TABLET ORAL at 17:37

## 2024-04-21 RX ADMIN — HEPARIN SODIUM 5000 UNITS: 5000 INJECTION, SOLUTION INTRAVENOUS; SUBCUTANEOUS at 13:42

## 2024-04-21 RX ADMIN — HEPARIN SODIUM 5000 UNITS: 5000 INJECTION, SOLUTION INTRAVENOUS; SUBCUTANEOUS at 21:16

## 2024-04-21 RX ADMIN — ACETAMINOPHEN 325MG 650 MG: 325 TABLET ORAL at 06:28

## 2024-04-21 RX ADMIN — ATENOLOL 25 MG: 25 TABLET ORAL at 08:23

## 2024-04-21 RX ADMIN — ACETAMINOPHEN 325MG 650 MG: 325 TABLET ORAL at 21:16

## 2024-04-21 RX ADMIN — EZETIMIBE 10 MG: 10 TABLET ORAL at 08:23

## 2024-04-21 NOTE — ASSESSMENT & PLAN NOTE
She missed a step while going down her front porch.she had mechanical fall and fell sideways and felt pain in her left ankle.  Unable to stand afterwards despite help.  Patient was complaining of 8 x 10 pain earlier but now pain is controlled with Tylenol  xray shows trimalleolar fracture of the left ankle.  Consult Ortho.  Plan for surgical intervention either Monday or Tuesday.  Keep n.p.o. after midnight  Xarelto on hold.  Discussed with orthopedics placed on heparin for DVT prophylaxis labs and hemodynamics are stable.  Patient is medically cleared to proceed with surgical intervention from a medical standpoint

## 2024-04-21 NOTE — PLAN OF CARE
Problem: PAIN - ADULT  Goal: Verbalizes/displays adequate comfort level or baseline comfort level  Description: Interventions:  - Encourage patient to monitor pain and request assistance  - Assess pain using appropriate pain scale  - Administer analgesics based on type and severity of pain and evaluate response  - Implement non-pharmacological measures as appropriate and evaluate response  - Consider cultural and social influences on pain and pain management  - Notify physician/advanced practitioner if interventions unsuccessful or patient reports new pain  Outcome: Progressing     Problem: INFECTION - ADULT  Goal: Absence or prevention of progression during hospitalization  Description: INTERVENTIONS:  - Assess and monitor for signs and symptoms of infection  - Monitor lab/diagnostic results  - Monitor all insertion sites, i.e. indwelling lines, tubes, and drains  - Monitor endotracheal if appropriate and nasal secretions for changes in amount and color  - [place] appropriate cooling/warming therapies per order  - Administer medications as ordered  - Instruct and encourage patient and family to use good hand hygiene technique  - Identify and instruct in appropriate isolation precautions for identified infection/condition  Outcome: Progressing     Problem: SAFETY ADULT  Goal: Patient will remain free of falls  Description: INTERVENTIONS:  - Educate patient/family on patient safety including physical limitations  - Instruct patient to call for assistance with activity   - Consult OT/PT to assist with strengthening/mobility   - Keep Call bell within reach  - Keep bed low and locked with side rails adjusted as appropriate  - Keep care items and personal belongings within reach  - Initiate and maintain comfort rounds  - Make Fall Risk Sign visible to staff  - Apply yellow socks and bracelet for high fall risk patients  - Consider moving patient to room near nurses station  Outcome: Progressing  Goal: Maintain or  return to baseline ADL function  Description: INTERVENTIONS:  -  Assess patient's ability to carry out ADLs; assess patient's baseline for ADL function and identify physical deficits which impact ability to perform ADLs (bathing, care of mouth/teeth, toileting, grooming, dressing, etc.)  - Assess/evaluate cause of self-care deficits   - Assess range of motion  - Assess patient's mobility; develop plan if impaired  - Assess patient's need for assistive devices and provide as appropriate  - Encourage maximum independence but intervene and supervise when necessary  - Involve family in performance of ADLs  - Assess for home care needs following discharge   - Consider OT consult to assist with ADL evaluation and planning for discharge  - Provide patient education as appropriate  Outcome: Progressing  Goal: Maintains/Returns to pre admission functional level  Description: INTERVENTIONS:  - Perform AM-PAC 6 Click Basic Mobility/ Daily Activity assessment daily.  - Set and communicate daily mobility goal to care team and patient/family/caregiver.   - Collaborate with rehabilitation services on mobility goals if consulted  - Out of bed for toileting  - Record patient progress and toleration of activity level   Outcome: Progressing     Problem: DISCHARGE PLANNING  Goal: Discharge to home or other facility with appropriate resources  Description: INTERVENTIONS:  - Identify barriers to discharge w/patient and caregiver  - Arrange for needed discharge resources and transportation as appropriate  - Identify discharge learning needs (meds, wound care, etc.)  - Arrange for interpretive services to assist at discharge as needed  - Refer to Case Management Department for coordinating discharge planning if the patient needs post-hospital services based on physician/advanced practitioner order or complex needs related to functional status, cognitive ability, or social support system  Outcome: Progressing     Problem: Knowledge  Deficit  Goal: Patient/family/caregiver demonstrates understanding of disease process, treatment plan, medications, and discharge instructions  Description: Complete learning assessment and assess knowledge base.  Interventions:  - Provide teaching at level of understanding  - Provide teaching via preferred learning methods  Outcome: Progressing     Problem: CARDIOVASCULAR - ADULT  Goal: Maintains optimal cardiac output and hemodynamic stability  Description: INTERVENTIONS:  - Monitor I/O, vital signs and rhythm  - Monitor for S/S and trends of decreased cardiac output  - Administer and titrate ordered vasoactive medications to optimize hemodynamic stability  - Assess quality of pulses, skin color and temperature  - Assess for signs of decreased coronary artery perfusion  - Instruct patient to report change in severity of symptoms  Outcome: Progressing  Goal: Absence of cardiac dysrhythmias or at baseline rhythm  Description: INTERVENTIONS:  - Continuous cardiac monitoring, vital signs, obtain 12 lead EKG if ordered  - Administer antiarrhythmic and heart rate control medications as ordered  - Monitor electrolytes and administer replacement therapy as ordered  Outcome: Progressing     Problem: MUSCULOSKELETAL - ADULT  Goal: Maintain or return mobility to safest level of function  Description: INTERVENTIONS:  - Assess patient's ability to carry out ADLs; assess patient's baseline for ADL function and identify physical deficits which impact ability to perform ADLs (bathing, care of mouth/teeth, toileting, grooming, dressing, etc.)  - Assess/evaluate cause of self-care deficits   - Assess range of motion  - Assess patient's mobility  - Assess patient's need for assistive devices and provide as appropriate  - Encourage maximum independence but intervene and supervise when necessary  - Involve family in performance of ADLs  - Assess for home care needs following discharge   - Consider OT consult to assist with ADL  evaluation and planning for discharge  - Provide patient education as appropriate  Outcome: Progressing  Goal: Maintain proper alignment of affected body part  Description: INTERVENTIONS:  - Support, maintain and protect limb and body alignment  - Provide patient/ family with appropriate education  Outcome: Progressing

## 2024-04-21 NOTE — OCCUPATIONAL THERAPY NOTE
Occupational Therapy Progress Note     Patient Name: Karly Helton  Today's Date: 4/21/2024  Problem List  Principal Problem:    Closed fracture of left ankle with routine healing  Active Problems:    Prothrombin gene mutation (HCC)    Essential hypertension    Mixed hyperlipidemia        04/21/24 1301   Note Type   Note type Cancelled Session   Cancel Reasons Medical status         OT orders received; chart review completed. Attempted to see pt for OT evaluation however it was recommended to hold therapy services at this time d/t pending surgical intervention for L LE comminuted trimalleolar fractures with subluxation across the tibiotalar joint with slight posterior talar displacement.  Will continue to follow pt and address as medically appropriate and as schedule allows.    Mela Lancaster MS OTR/L

## 2024-04-21 NOTE — UTILIZATION REVIEW
Initial Clinical Review    Admission: Date/Time/Statement:   Admission Orders (From admission, onward)       Ordered        04/20/24 1605  INPATIENT ADMISSION  Once                          Orders Placed This Encounter   Procedures    INPATIENT ADMISSION     Standing Status:   Standing     Number of Occurrences:   1     Order Specific Question:   Level of Care     Answer:   Med Surg [16]     Order Specific Question:   Estimated length of stay     Answer:   More than 2 Midnights     Order Specific Question:   Certification     Answer:   I certify that inpatient services are medically necessary for this patient for a duration of greater than two midnights. See H&P and MD Progress Notes for additional information about the patient's course of treatment.     ED Arrival Information       Expected   -    Arrival   4/20/2024 14:15    Acuity   Less Urgent              Means of arrival   Wheelchair    Escorted by   Family Member    Service   Hospitalist    Admission type   Emergency              Arrival complaint   fall, no hs, unsure if bt, ankle injury             Chief Complaint   Patient presents with    Ankle Injury     Pt states twisted her left ankle on the last step and fell on hands and knees on grass. Denies loc or hitting head. On xaralto.        Initial Presentation: 73 y.o. female to ED presents for Left ankle pain. Per pt, she fell at home where she missed the last step on her front porch and fell sideways and injured her left foot and felt a lot of pain. Unable to stand. Despite passerby's helping her she could not get up. Continues to complain of 8 x 10 pain unable to ambulate in the ED. PMH for HLD, HTN and Prothrombin gene mutation.   Admit to Inpatient Dx; Fall with Left ankle fracture. Orthopedic consult. Xarelto on hold. Pain control. Continue home meds.    Date: 4/21   Day 2:   Orthopedic cons; S/p Fall with Left ankle trimalleolar fracture. NWB LLE in AO splint. Analgesics for pain. Medicine for  clearance to OR.   Plan OR for ANDREA of left ankle fracture - TBD. Ice and elevating left ankle to reduce swelling.     Progress notes; Plan for OR Monday or Tuesday. NPO at MN. Pain control.   On exam; Left foot splint.     ED Triage Vitals [04/20/24 1425]   Temperature Pulse Respirations Blood Pressure SpO2   98 °F (36.7 °C) 72 18 (!) 184/80 95 %      Temp src Heart Rate Source Patient Position - Orthostatic VS BP Location FiO2 (%)   -- -- -- -- --      Pain Score       5          Wt Readings from Last 1 Encounters:   04/20/24 110 kg (241 lb 10 oz)     Additional Vital Signs:   04/21/24 0800 -- -- -- -- -- 97 % None (Room air)   04/21/24 07:48:10 97.3 °F (36.3 °C) Abnormal  72 19 157/91 113 95 % --   04/20/24 22:08:01 97.3 °F (36.3 °C) Abnormal  58 18 142/70 94 96 % --   04/20/24 2000 -- -- -- -- -- 97 % None (Room air)   04/20/24 16:50:10 97 °F (36.1 °C) Abnormal  61 18 167/86 113 96 % --     Pertinent Labs/Diagnostic Test Results:   XR ankle 3+ views LEFT   ED Interpretation by John Paul Evans DO (04/20 1602)   Trimalleolar fracture      Final Result by Tom Noble MD (04/21 0756)   Trimalleolar fracture dislocation.      Findings concur with ED results as provided in PACS viewer/EPIC at the time of interpretation.            Workstation performed: [id number]               Results from last 7 days   Lab Units 04/21/24  0510 04/20/24  1530   WBC Thousand/uL 8.51 8.11   HEMOGLOBIN g/dL 12.9 13.3   HEMATOCRIT % 38.8 40.6   PLATELETS Thousands/uL 164 185   TOTAL NEUT ABS Thousands/µL  --  5.19         Results from last 7 days   Lab Units 04/21/24  0510 04/20/24  1530   SODIUM mmol/L 140 140   POTASSIUM mmol/L 3.8 3.9   CHLORIDE mmol/L 109* 108   CO2 mmol/L 25 25   ANION GAP mmol/L 6 7   BUN mg/dL 24 29*   CREATININE mg/dL 0.94 1.20   EGFR ml/min/1.73sq m 59 44   CALCIUM mg/dL 9.5 9.3     Results from last 7 days   Lab Units 04/21/24  0510   AST U/L 19   ALT U/L 13   ALK PHOS U/L 41   TOTAL PROTEIN g/dL 6.3*    ALBUMIN g/dL 3.7   TOTAL BILIRUBIN mg/dL 0.60         Results from last 7 days   Lab Units 04/21/24  0510 04/20/24  1530   GLUCOSE RANDOM mg/dL 101 114           Results from last 7 days   Lab Units 04/21/24  0510 04/20/24  1530   PROTIME seconds 13.3 13.2   INR  0.98 0.97     Results from last 7 days   Lab Units 04/21/24  0510   TSH 3RD GENERATON uIU/mL 0.930       ED Treatment:   Medication Administration from 04/20/2024 1414 to 04/20/2024 1641         Date/Time Order Dose Route Action     04/20/2024 1440 EDT acetaminophen (TYLENOL) tablet 975 mg 975 mg Oral Given     04/20/2024 1534 EDT morphine injection 4 mg 4 mg Intravenous Given          Past Medical History:   Diagnosis Date    Blood clotting disorder (HCC)     History of pulmonary embolus (PE)     Hypertension      Present on Admission:  **None**      Admitting Diagnosis: Closed trimalleolar fracture of left ankle, initial encounter [S82.852A]  Ambulatory dysfunction [R26.2]  Age/Sex: 73 y.o. female    Admission Orders:  Scheduled Medications:  atenolol, 25 mg, Oral, QAM  ezetimibe, 10 mg, Oral, Daily  heparin (porcine), 5,000 Units, Subcutaneous, Q8H ANGELI  pravastatin, 20 mg, Oral, Daily With Dinner      Continuous IV Infusions: None     PRN Meds:  acetaminophen, 650 mg, Oral, Q6H PRN  morphine injection, 2 mg, Intravenous, Q6H PRN  ondansetron, 4 mg, Intravenous, Q6H PRN 4/20 x1  oxyCODONE, 5 mg, Oral, Q6H PRN        IP CONSULT TO ORTHOPEDIC SURGERY    Network Utilization Review Department  ATTENTION: Please call with any questions or concerns to [phone] and carefully listen to the prompts so that you are directed to the right person. All voicemails are confidential.   For Discharge needs, contact Care Management DC Support Team at [phone] opt. 2  Send all requests for admission clinical reviews, approved or denied determinations and any other requests to dedicated fax number below belonging to the campus where the patient is receiving  treatment. List of dedicated fax numbers for the Facilities:  FACILITY NAME UR FAX NUMBER   ADMISSION DENIALS (Administrative/Medical Necessity) [phone]   DISCHARGE SUPPORT TEAM (NETWORK) [phone]   PARENT CHILD HEALTH (Maternity/NICU/Pediatrics) [phone]   [facility] [phone]   [facility] [phone]   [facility] [phone]   [facility] [phone]   [facility] [phone]   [facility] [phone]   [facility] [phone]   [facility] [phone]   [facility] [phone]   [facility] [phone]   [facility] [phone]   [facility] [phone]

## 2024-04-21 NOTE — PROGRESS NOTES
[facility]  [facility]  Name: Karly Helton I  MRN: [id number]  Unit/Bed#: -01 I Date of Admission: 4/20/2024   Date of Service: 4/21/2024 I Hospital Day: 1    Assessment/Plan   * Closed fracture of left ankle with routine healing  Assessment & Plan  She missed a step while going down her front porch.she had mechanical fall and fell sideways and felt pain in her left ankle.  Unable to stand afterwards despite help.  Patient was complaining of 8 x 10 pain earlier but now pain is controlled with Tylenol  xray shows trimalleolar fracture of the left ankle.  Consult Ortho.  Plan for surgical intervention either Monday or Tuesday.  Keep n.p.o. after midnight  Xarelto on hold.  Discussed with orthopedics placed on heparin for DVT prophylaxis labs and hemodynamics are stable.  Patient is medically cleared to proceed with surgical intervention from a medical standpoint    Mixed hyperlipidemia  Assessment & Plan  Continue statin therapy    Essential hypertension  Assessment & Plan  Continue atenolol as per home regimen continue pain control    Prothrombin gene mutation (HCC)  Assessment & Plan  History Of DVT in the past.  Known history of prothrombin gene mutation.  Currently on Xarelto 10 mg daily.  On hold for now               VTE Pharmacologic Prophylaxis:   Moderate Risk (Score 3-4) - Pharmacological DVT Prophylaxis Ordered: heparin.    Mobility:   Basic Mobility Inpatient Raw Score: 12  JH-HLM Goal: 4: Move to chair/commode  JH-HLM Achieved: 2: Bed activities/Dependent transfer  JH-HLM Goal achieved. Continue to encourage appropriate mobility.    Patient Centered Rounds: I performed bedside rounds with nursing staff today.   Discussions with Specialists or Other Care Team Provider: ortho    Education and Discussions with Family / Patient: Updated  (daughter) at bedside.    Total Time Spent on Date of Encounter in care of patient: 35 mins. This time was spent on one  or more of the following: performing physical exam; counseling and coordination of care; obtaining or reviewing history; documenting in the medical record; reviewing/ordering tests, medications or procedures; communicating with other healthcare professionals and discussing with patient's family/caregivers.    Current Length of Stay: 1 day(s)  Current Patient Status: Inpatient   Certification Statement: The patient will continue to require additional inpatient hospital stay due to left trimalleolar fracture  Discharge Plan: Anticipate discharge in 48-72 hrs to discharge location to be determined pending rehab evaluations.    Code Status: Level 1 - Full Code    Subjective:   Denies any chest pain or shortness of breath her left foot pain is better controlled today with Tylenol overall she is feeling better compared to yesterday    Objective:     Vitals:   Temp (24hrs), Av.4 °F (36.3 °C), Min:97 °F (36.1 °C), Max:98 °F (36.7 °C)    Temp:  [97 °F (36.1 °C)-98 °F (36.7 °C)] 97.3 °F (36.3 °C)  HR:  [58-72] 72  Resp:  [18-19] 19  BP: (142-184)/(70-91) 157/91  SpO2:  [95 %-97 %] 97 %  Body mass index is 39.6 kg/m².     Input and Output Summary (last 24 hours):     Intake/Output Summary (Last 24 hours) at 2024 1039  Last data filed at 2024 0833  Gross per 24 hour   Intake 360 ml   Output 200 ml   Net 160 ml       Physical Exam:   Physical Exam  Vitals and nursing note reviewed.   Constitutional:       Appearance: Normal appearance.   HENT:      Head: Normocephalic and atraumatic.      Right Ear: External ear normal.      Left Ear: External ear normal.      Nose: Nose normal.      Mouth/Throat:      Pharynx: Oropharynx is clear.   Cardiovascular:      Rate and Rhythm: Normal rate and regular rhythm.      Heart sounds: Normal heart sounds.   Pulmonary:      Effort: Pulmonary effort is normal.      Breath sounds: Normal breath sounds.   Abdominal:      General: Bowel sounds are normal.      Palpations: Abdomen is  soft.      Tenderness: There is no abdominal tenderness.   Musculoskeletal:         General: Normal range of motion.      Cervical back: Normal range of motion and neck supple.      Comments: Left foot splint   Skin:     General: Skin is warm and dry.      Capillary Refill: Capillary refill takes less than 2 seconds.   Neurological:      General: No focal deficit present.      Mental Status: She is alert and oriented to person, place, and time.   Psychiatric:         Mood and Affect: Mood normal.            Additional Data:     Labs:  Results from last 7 days   Lab Units 04/21/24  0510 04/20/24  1530   WBC Thousand/uL 8.51 8.11   HEMOGLOBIN g/dL 12.9 13.3   HEMATOCRIT % 38.8 40.6   PLATELETS Thousands/uL 164 185   SEGS PCT %  --  63   LYMPHO PCT %  --  24   MONO PCT %  --  10   EOS PCT %  --  2     Results from last 7 days   Lab Units 04/21/24  0510   SODIUM mmol/L 140   POTASSIUM mmol/L 3.8   CHLORIDE mmol/L 109*   CO2 mmol/L 25   BUN mg/dL 24   CREATININE mg/dL 0.94   ANION GAP mmol/L 6   CALCIUM mg/dL 9.5   ALBUMIN g/dL 3.7   TOTAL BILIRUBIN mg/dL 0.60   ALK PHOS U/L 41   ALT U/L 13   AST U/L 19   GLUCOSE RANDOM mg/dL 101     Results from last 7 days   Lab Units 04/21/24  0510   INR  0.98                   Lines/Drains:  Invasive Devices       Peripheral Intravenous Line  Duration             Peripheral IV 04/20/24 Right;Ventral (anterior) Forearm <1 day                          Imaging: Reviewed radiology reports from this admission including: xray(s)    Recent Cultures (last 7 days):         Last 24 Hours Medication List:   Current Facility-Administered Medications   Medication Dose Route Frequency Provider Last Rate    acetaminophen  650 mg Oral Q6H PRN Zaida Hayes MD      atenolol  25 mg Oral QAM Zaida Hayes MD      ezetimibe  10 mg Oral Daily Zaida Hayes MD      heparin (porcine)  5,000 Units Subcutaneous Q8H [facility] Zaida Hayes MD      morphine injection  2 mg Intravenous Q6H PRN Zaida Hayes MD       ondansetron  4 mg Intravenous Q6H PRN Zaida Hayes MD      oxyCODONE  5 mg Oral Q6H PRN Zaida Hayes MD      pravastatin  20 mg Oral Daily With Dinner Zaida Hayes MD          Today, Patient Was Seen By: Zaida Hayes MD    **Please Note: This note may have been constructed using a voice recognition system.**

## 2024-04-21 NOTE — CONSULTS
Orthopedics   Karly Helton 73 y.o. female MRN: [id number]  Unit/Bed#: -01      Chief Complaint:   fall    HPI:  73 y.o.female with past medical history of prothrombin gene mutation, hypertension, and hyperlipidemia presents with history of a fall. Patient reports she was going down her front porch steps yesterday when she missed a step and fell down injuring her left ankle. Patient was unable to ambulate after the fall and her daughter brought her to the ED where xrays of left ankle confirmed a left ankle trimalleolar fracture. Patient was placed in a short leg splint in the ED. Patient reports pain is well localized around the left ankle. Patient reports pain is currently well controlled with tylenol. Patient denies any numbness or tingling in the left lower extremity. Patient denies any fevers or chills. Patient offers no additional complaints.       Review Of Systems:   Skin: WNL  Neuro: See HPI  Musculoskeletal: See HPI  14 point review of systems negative except as stated above     Past Medical History:   Past Medical History:   Diagnosis Date    Blood clotting disorder (HCC)     History of pulmonary embolus (PE)     Hypertension        Past Surgical History:   Past Surgical History:   Procedure Laterality Date    HIP SURGERY         Family History:  Family history reviewed and non-contributory  Family History   Problem Relation Age of Onset    Hypertension Father        Social History:  Social History     Socioeconomic History    Marital status: /Civil Union     Spouse name: None    Number of children: None    Years of education: None    Highest education level: None   Occupational History    None   Tobacco Use    Smoking status: Never    Smokeless tobacco: Never   Substance and Sexual Activity    Alcohol use: Not Currently    Drug use: Not Currently    Sexual activity: Not Currently   Other Topics Concern    None   Social History Narrative    None     Social Determinants of Health  "    Financial Resource Strain: Not on file   Food Insecurity: Not on file   Transportation Needs: Not on file   Physical Activity: Not on file   Stress: Not on file   Social Connections: Not on file   Intimate Partner Violence: Not on file   Housing Stability: Not on file       Allergies:   Allergies   Allergen Reactions    Atorvastatin Arthralgia, Myalgia and Rash    Ezetimibe-Simvastatin Diarrhea, GI Intolerance and Vomiting           Labs:  0   Lab Value Date/Time    HCT 38.8 04/21/2024 0510    HCT 40.6 04/20/2024 1530    HGB 12.9 04/21/2024 0510    HGB 13.3 04/20/2024 1530    INR 0.98 04/21/2024 0510    WBC 8.51 04/21/2024 0510    WBC 8.11 04/20/2024 1530    CRP <0.40 01/24/2024 0626       Meds:    Current Facility-Administered Medications:     acetaminophen (TYLENOL) tablet 650 mg, 650 mg, Oral, Q6H PRN, Zaida Hayes MD, 650 mg at 04/21/24 0628    atenolol (TENORMIN) tablet 25 mg, 25 mg, Oral, QAM, Zaida Hayes MD, 25 mg at 04/21/24 0823    ezetimibe (ZETIA) tablet 10 mg, 10 mg, Oral, Daily, Zaida Hayes MD, 10 mg at 04/21/24 0823    morphine injection 2 mg, 2 mg, Intravenous, Q6H PRN, Zaida Hayes MD    ondansetron (ZOFRAN) injection 4 mg, 4 mg, Intravenous, Q6H PRN, Zaida Hayes MD, 4 mg at 04/20/24 1736    oxyCODONE (ROXICODONE) IR tablet 5 mg, 5 mg, Oral, Q6H PRN, Zaida Hayes MD    pravastatin (PRAVACHOL) tablet 20 mg, 20 mg, Oral, Daily With Dinner, Zaida Hayes MD    Blood Culture:   No results found for: \"BLOODCX\"    Wound Culture:   No results found for: \"WOUNDCULT\"    Ins and Outs:  I/O last 24 hours:  In: 360 [P.O.:360]  Out: 200 [Emesis/NG output:200]          Physical Exam:   /91   Pulse 72   Temp (!) 97.3 °F (36.3 °C)   Resp 19   Wt 110 kg (241 lb 10 oz)   SpO2 97%   BMI 39.60 kg/m²   Gen: No acute distress, resting comfortably in bed  HEENT: Eyes clear, moist mucus membranes, hearing intact  Respiratory: No audible wheezing or stridor  Cardiovascular: Well Perfused peripherally, 2+ " distal pulse  Abdomen: nondistended, no peritoneal signs  Musculoskeletal: left lower extremity  Skin intact. Extremity appears well perfused overall. Short leg splint intact.   ROM of ankle not assessed 2/2 known fracture  Sensation intact DP/SP/Tib/[place]/Saph  Positive knee flexion/extension, EHL/FHL  Able to perform straight leg raise  2+ DP  pulse  Leg lengths equal  Musculature is soft and compressible, no pain with passive stretch  Cap refill < 2 sec    Radiology:   I personally reviewed the films.  X-rays left ankle:  Comminuted trimalleolar fractures with subluxation across the tibiotalar joint with slight posterior talar displacement. No significant degenerative changes. No lytic or blastic osseous lesion. Unremarkable soft tissues.       _*_*_*_*_*_*_*_*_*_*_*_*_*_*_*_*_*_*_*_*_*_*_*_*_*_*_*_*_*_*_*_*_*_*_*_*_*_*_*_*_*    Assessment:  73 y.o.female status post fall with left ankle trimalleolar fracture    Plan:   NWB left lower extremity in AO splint  Analgesics for pain per primary team  Apply ice and elevate left ankle regularly  DVT ppx per primary team   PT/OT postop eval   Medicine team for clearance to OR  Body mass index is 39.6 kg/m². moderately obese. Recommend behavior modifications.  Plan to go to OR for ORIF of left ankle fracture in the next few days pending OR and surgeon availability  Dispo: Ortho will follow. Discussed importance of applying ice and elevating left ankle to reduce swelling. Discussed that surgical intervention will be indicated in the near future involving open reduction internal fixation of left ankle. Daughter and patient aware with all questions answered. Please see above for additional details.      Sade Burleson PA-C

## 2024-04-21 NOTE — PLAN OF CARE
Problem: PAIN - ADULT  Goal: Verbalizes/displays adequate comfort level or baseline comfort level  Description: Interventions:  - Encourage patient to monitor pain and request assistance  - Assess pain using appropriate pain scale  - Administer analgesics based on type and severity of pain and evaluate response  - Implement non-pharmacological measures as appropriate and evaluate response  - Consider cultural and social influences on pain and pain management  - Notify physician/advanced practitioner if interventions unsuccessful or patient reports new pain  Outcome: Progressing     Problem: INFECTION - ADULT  Goal: Absence or prevention of progression during hospitalization  Description: INTERVENTIONS:  - Assess and monitor for signs and symptoms of infection  - Monitor lab/diagnostic results  - Monitor all insertion sites, i.e. indwelling lines, tubes, and drains  - Monitor endotracheal if appropriate and nasal secretions for changes in amount and color  - [place] appropriate cooling/warming therapies per order  - Administer medications as ordered  - Instruct and encourage patient and family to use good hand hygiene technique  - Identify and instruct in appropriate isolation precautions for identified infection/condition  Outcome: Progressing  Goal: Absence of fever/infection during neutropenic period  Description: INTERVENTIONS:  - Monitor WBC    Outcome: Progressing     Problem: SAFETY ADULT  Goal: Patient will remain free of falls  Description: INTERVENTIONS:  - Educate patient/family on patient safety including physical limitations  - Instruct patient to call for assistance with activity   - Consult OT/PT to assist with strengthening/mobility   - Keep Call bell within reach  - Keep bed low and locked with side rails adjusted as appropriate  - Keep care items and personal belongings within reach  - Initiate and maintain comfort rounds  - Make Fall Risk Sign visible to staff  - Offer Toileting every 2 Hours,  in advance of need  - Initiate/Maintain bed alarm  - Obtain necessary fall risk management equipment:   - Apply yellow socks and bracelet for high fall risk patients  - Consider moving patient to room near nurses station  Outcome: Progressing  Goal: Maintain or return to baseline ADL function  Description: INTERVENTIONS:  -  Assess patient's ability to carry out ADLs; assess patient's baseline for ADL function and identify physical deficits which impact ability to perform ADLs (bathing, care of mouth/teeth, toileting, grooming, dressing, etc.)  - Assess/evaluate cause of self-care deficits   - Assess range of motion  - Assess patient's mobility; develop plan if impaired  - Assess patient's need for assistive devices and provide as appropriate  - Encourage maximum independence but intervene and supervise when necessary  - Involve family in performance of ADLs  - Assess for home care needs following discharge   - Consider OT consult to assist with ADL evaluation and planning for discharge  - Provide patient education as appropriate  Outcome: Progressing  Goal: Maintains/Returns to pre admission functional level  Description: INTERVENTIONS:  - Perform AM-PAC 6 Click Basic Mobility/ Daily Activity assessment daily.  - Set and communicate daily mobility goal to care team and patient/family/caregiver.   - Collaborate with rehabilitation services on mobility goals if consulted  - Perform Range of Motion 4 times a day.  - Reposition patient every 2 hours.  - Dangle patient 2 times a day  - Stand patient 2 times a day  - Ambulate patient 2 times a day  - Out of bed to chair 2 times a day   - Out of bed for meals 2 times a day  - Out of bed for toileting  - Record patient progress and toleration of activity level   Outcome: Progressing     Problem: DISCHARGE PLANNING  Goal: Discharge to home or other facility with appropriate resources  Description: INTERVENTIONS:  - Identify barriers to discharge w/patient and caregiver  -  Arrange for needed discharge resources and transportation as appropriate  - Identify discharge learning needs (meds, wound care, etc.)  - Arrange for interpretive services to assist at discharge as needed  - Refer to Case Management Department for coordinating discharge planning if the patient needs post-hospital services based on physician/advanced practitioner order or complex needs related to functional status, cognitive ability, or social support system  Outcome: Progressing     Problem: Knowledge Deficit  Goal: Patient/family/caregiver demonstrates understanding of disease process, treatment plan, medications, and discharge instructions  Description: Complete learning assessment and assess knowledge base.  Interventions:  - Provide teaching at level of understanding  - Provide teaching via preferred learning methods  Outcome: Progressing     Problem: CARDIOVASCULAR - ADULT  Goal: Maintains optimal cardiac output and hemodynamic stability  Description: INTERVENTIONS:  - Monitor I/O, vital signs and rhythm  - Monitor for S/S and trends of decreased cardiac output  - Administer and titrate ordered vasoactive medications to optimize hemodynamic stability  - Assess quality of pulses, skin color and temperature  - Assess for signs of decreased coronary artery perfusion  - Instruct patient to report change in severity of symptoms  Outcome: Progressing  Goal: Absence of cardiac dysrhythmias or at baseline rhythm  Description: INTERVENTIONS:  - Continuous cardiac monitoring, vital signs, obtain 12 lead EKG if ordered  - Administer antiarrhythmic and heart rate control medications as ordered  - Monitor electrolytes and administer replacement therapy as ordered  Outcome: Progressing     Problem: MUSCULOSKELETAL - ADULT  Goal: Maintain or return mobility to safest level of function  Description: INTERVENTIONS:  - Assess patient's ability to carry out ADLs; assess patient's baseline for ADL function and identify physical  deficits which impact ability to perform ADLs (bathing, care of mouth/teeth, toileting, grooming, dressing, etc.)  - Assess/evaluate cause of self-care deficits   - Assess range of motion  - Assess patient's mobility  - Assess patient's need for assistive devices and provide as appropriate  - Encourage maximum independence but intervene and supervise when necessary  - Involve family in performance of ADLs  - Assess for home care needs following discharge   - Consider OT consult to assist with ADL evaluation and planning for discharge  - Provide patient education as appropriate  Outcome: Progressing  Goal: Maintain proper alignment of affected body part  Description: INTERVENTIONS:  - Support, maintain and protect limb and body alignment  - Provide patient/ family with appropriate education  Outcome: Progressing

## 2024-04-21 NOTE — PHYSICAL THERAPY NOTE
PHYSICAL THERAPY NOTE        Patient Name: Karly Helton  Today's Date: 4/21/2024 04/21/24 1108   Note Type   Note type Cancelled Session;Evaluation   Cancel Reasons Medical status       Received order for PT consult. Chart reviewed. Pt admitted with diagnosis of L LE comminuted trimalleolar fractures with subluxation across the tibiotalar joint with slight posterior talar displacement. At this time, PT session cancelled due to pt pending surgical intervention in next couple of days.  Ortho suggesting PT/OT postop eval.  Will follow and see patient as medically appropriate after surgery.        Alexis Hendricks, PT

## 2024-04-22 PROCEDURE — 97167 OT EVAL HIGH COMPLEX 60 MIN: CPT

## 2024-04-22 PROCEDURE — 97163 PT EVAL HIGH COMPLEX 45 MIN: CPT

## 2024-04-22 PROCEDURE — 99232 SBSQ HOSP IP/OBS MODERATE 35: CPT | Performed by: FAMILY MEDICINE

## 2024-04-22 PROCEDURE — NC001 PR NO CHARGE: Performed by: ORTHOPAEDIC SURGERY

## 2024-04-22 PROCEDURE — 97116 GAIT TRAINING THERAPY: CPT

## 2024-04-22 PROCEDURE — 97530 THERAPEUTIC ACTIVITIES: CPT

## 2024-04-22 RX ADMIN — HEPARIN SODIUM 5000 UNITS: 5000 INJECTION, SOLUTION INTRAVENOUS; SUBCUTANEOUS at 05:43

## 2024-04-22 RX ADMIN — EZETIMIBE 10 MG: 10 TABLET ORAL at 08:26

## 2024-04-22 RX ADMIN — ATENOLOL 25 MG: 25 TABLET ORAL at 08:26

## 2024-04-22 RX ADMIN — HEPARIN SODIUM 5000 UNITS: 5000 INJECTION, SOLUTION INTRAVENOUS; SUBCUTANEOUS at 21:01

## 2024-04-22 RX ADMIN — ACETAMINOPHEN 325MG 650 MG: 325 TABLET ORAL at 12:53

## 2024-04-22 RX ADMIN — ACETAMINOPHEN 325MG 650 MG: 325 TABLET ORAL at 20:24

## 2024-04-22 RX ADMIN — PRAVASTATIN SODIUM 20 MG: 20 TABLET ORAL at 16:26

## 2024-04-22 RX ADMIN — HEPARIN SODIUM 5000 UNITS: 5000 INJECTION, SOLUTION INTRAVENOUS; SUBCUTANEOUS at 12:53

## 2024-04-22 NOTE — PLAN OF CARE
Problem: PHYSICAL THERAPY ADULT  Goal: Performs mobility at highest level of function for planned discharge setting.  See evaluation for individualized goals.  Description: Treatment/Interventions: Functional transfer training, LE strengthening/ROM, Elevations, ADL retraining, Therapeutic exercise, Endurance training, Patient/family training, Equipment eval/education, Bed mobility, Gait training, Compensatory technique education, OT          See flowsheet documentation for full assessment, interventions and recommendations.  Note: Prognosis: Good  Problem List: Decreased strength, Decreased endurance, Impaired balance, Decreased mobility, Pain, Orthopedic restrictions, Decreased skin integrity, Obesity  Assessment: Pt is a 73 y.o. female seen for PT evaluation s/p admission to [facility] on 4/20/2024 with Closed fracture of left ankle with routine healing.  Order placed for PT services.  Upon evaluation: Pt is presenting with impaired functional mobility due to pain, decreased strength, decreased endurance, impaired balance, gait deviations, orthopedic restrictions, fall risk, and impaired skin integrity requiring  SPV - min assistance for bed mobility and min - mod assistance for transfers and ambulation with RW . Pt's clinical presentation is currently unstable/unpredictable given the functional mobility deficits above, especially decreased activity tolerance, coupled with fall risks as indicated by AM-PAC 6-Clicks: 14/24 as well as hx of falls and obesity and combined with medical complications of pain impacting overall mobility status, need for input for mobility technique/safety, and NWB status L LE .  Pt's PMHx and comorbidities that may affect physical performance and progress include: h/o PE and HTN. Personal factors affecting pt at time of IE include: inaccessible home environment, step(s) to enter environment, multi-level environment, inability to perform IADLs, inability to perform  ADLs, inability to navigate level surfaces without external assistance, inability to ambulate household distances, inability to navigate community distances, and recent fall(s)/fall history. Pt will benefit from continued skilled PT services to address deficits as defined above and to maximize level of functional mobility to facilitate return toward PLOF and improved QOL. From PT/mobility standpoint, recommendation at time of d/c would be Level I (Maximum Resource Intensity) pending progress in order to reduce fall risk and maximize pt's functional independence and consistency with mobility in order to facilitate return to PLOF.  Recommend trial with walker next 1-2 sessions and ther ex next 1-2 sessions.  Barriers to Discharge: Other (Comment)  Barriers to Discharge Comments: NWB status L LE; current assistance for mobility; JULIEN  Rehab Resource Intensity Level, PT: I (Maximum Resource Intensity)    See flowsheet documentation for full assessment.

## 2024-04-22 NOTE — PLAN OF CARE
Problem: PAIN - ADULT  Goal: Verbalizes/displays adequate comfort level or baseline comfort level  Description: Interventions:  - Encourage patient to monitor pain and request assistance  - Assess pain using appropriate pain scale  - Administer analgesics based on type and severity of pain and evaluate response  - Implement non-pharmacological measures as appropriate and evaluate response  - Consider cultural and social influences on pain and pain management  - Notify physician/advanced practitioner if interventions unsuccessful or patient reports new pain  Outcome: Progressing     Problem: INFECTION - ADULT  Goal: Absence or prevention of progression during hospitalization  Description: INTERVENTIONS:  - Assess and monitor for signs and symptoms of infection  - Monitor lab/diagnostic results  - Monitor all insertion sites, i.e. indwelling lines, tubes, and drains  - Monitor endotracheal if appropriate and nasal secretions for changes in amount and color  - [place] appropriate cooling/warming therapies per order  - Administer medications as ordered  - Instruct and encourage patient and family to use good hand hygiene technique  - Identify and instruct in appropriate isolation precautions for identified infection/condition  Outcome: Progressing     Problem: SAFETY ADULT  Goal: Patient will remain free of falls  Description: INTERVENTIONS:  - Educate patient/family on patient safety including physical limitations  - Instruct patient to call for assistance with activity   - Consult OT/PT to assist with strengthening/mobility   - Keep Call bell within reach  - Keep bed low and locked with side rails adjusted as appropriate  - Keep care items and personal belongings within reach  - Initiate and maintain comfort rounds  - Make Fall Risk Sign visible to staff  - Offer Toileting every 2 Hours, in advance of need  - Initiate/Maintain bed alarm  - Obtain necessary fall risk management equipment: bed check  - Apply yellow  socks and bracelet for high fall risk patients  - Consider moving patient to room near nurses station  Outcome: Progressing  Goal: Maintain or return to baseline ADL function  Description: INTERVENTIONS:  -  Assess patient's ability to carry out ADLs; assess patient's baseline for ADL function and identify physical deficits which impact ability to perform ADLs (bathing, care of mouth/teeth, toileting, grooming, dressing, etc.)  - Assess/evaluate cause of self-care deficits   - Assess range of motion  - Assess patient's mobility; develop plan if impaired  - Assess patient's need for assistive devices and provide as appropriate  - Encourage maximum independence but intervene and supervise when necessary  - Involve family in performance of ADLs  - Assess for home care needs following discharge   - Consider OT consult to assist with ADL evaluation and planning for discharge  - Provide patient education as appropriate  Outcome: Progressing  Goal: Maintains/Returns to pre admission functional level  Description: INTERVENTIONS:  - Perform AM-PAC 6 Click Basic Mobility/ Daily Activity assessment daily.  - Set and communicate daily mobility goal to care team and patient/family/caregiver.   - Collaborate with rehabilitation services on mobility goals if consulted    - Reposition patient every 2 hours.    - Out of bed for toileting  - Record patient progress and toleration of activity level   Outcome: Progressing     Problem: DISCHARGE PLANNING  Goal: Discharge to home or other facility with appropriate resources  Description: INTERVENTIONS:  - Identify barriers to discharge w/patient and caregiver  - Arrange for needed discharge resources and transportation as appropriate  - Identify discharge learning needs (meds, wound care, etc.)  - Arrange for interpretive services to assist at discharge as needed  - Refer to Case Management Department for coordinating discharge planning if the patient needs post-hospital services based  on physician/advanced practitioner order or complex needs related to functional status, cognitive ability, or social support system  Outcome: Progressing     Problem: Knowledge Deficit  Goal: Patient/family/caregiver demonstrates understanding of disease process, treatment plan, medications, and discharge instructions  Description: Complete learning assessment and assess knowledge base.  Interventions:  - Provide teaching at level of understanding  - Provide teaching via preferred learning methods  Outcome: Progressing     Problem: CARDIOVASCULAR - ADULT  Goal: Maintains optimal cardiac output and hemodynamic stability  Description: INTERVENTIONS:  - Monitor I/O, vital signs and rhythm  - Monitor for S/S and trends of decreased cardiac output  - Administer and titrate ordered vasoactive medications to optimize hemodynamic stability  - Assess quality of pulses, skin color and temperature  - Assess for signs of decreased coronary artery perfusion  - Instruct patient to report change in severity of symptoms  Outcome: Progressing  Goal: Absence of cardiac dysrhythmias or at baseline rhythm  Description: INTERVENTIONS:  - Continuous cardiac monitoring, vital signs, obtain 12 lead EKG if ordered  - Administer antiarrhythmic and heart rate control medications as ordered  - Monitor electrolytes and administer replacement therapy as ordered  Outcome: Progressing     Problem: MUSCULOSKELETAL - ADULT  Goal: Maintain or return mobility to safest level of function  Description: INTERVENTIONS:  - Assess patient's ability to carry out ADLs; assess patient's baseline for ADL function and identify physical deficits which impact ability to perform ADLs (bathing, care of mouth/teeth, toileting, grooming, dressing, etc.)  - Assess/evaluate cause of self-care deficits   - Assess range of motion  - Assess patient's mobility  - Assess patient's need for assistive devices and provide as appropriate  - Encourage maximum independence but  intervene and supervise when necessary  - Involve family in performance of ADLs  - Assess for home care needs following discharge   - Consider OT consult to assist with ADL evaluation and planning for discharge  - Provide patient education as appropriate  Outcome: Progressing  Goal: Maintain proper alignment of affected body part  Description: INTERVENTIONS:  - Support, maintain and protect limb and body alignment  - Provide patient/ family with appropriate education  Outcome: Progressing

## 2024-04-22 NOTE — PROGRESS NOTES
Brief note: With left ankle trimalleolar fracture.  She is currently comfortable in a splint.  Her last Xarelto tablet was Friday at 4 PM.  There is a continued question on surgical timing either later today or tomorrow.  This will need to be discussed with attending surgeon.  As for now patient may have a morning breakfast but must be n.p.o. after that in case there is surgery later today.

## 2024-04-22 NOTE — OCCUPATIONAL THERAPY NOTE
Occupational Therapy Evaluation     Patient Name: Karly Helton  Today's Date: 4/22/2024  Problem List  Principal Problem:    Closed fracture of left ankle with routine healing  Active Problems:    Prothrombin gene mutation (HCC)    Essential hypertension    Mixed hyperlipidemia    Past Medical History  Past Medical History:   Diagnosis Date    Blood clotting disorder (HCC)     History of pulmonary embolus (PE)     Hypertension      Past Surgical History  Past Surgical History:   Procedure Laterality Date    HIP SURGERY          04/22/24 0950   Note Type   Note type Evaluation   Pain Assessment   Pain Assessment Tool FLACC   Pain Rating: FLACC (Rest) - Face 0   Pain Rating: FLACC (Rest) - Legs 0   Pain Rating: FLACC (Rest) - Activity 0   Pain Rating: FLACC (Rest) - Cry 0   Pain Rating: FLACC (Rest) - Consolability 0   Score: FLACC (Rest) 0   Pain Rating: FLACC (Activity) - Face 1   Pain Rating: FLACC (Activity) - Legs 0   Pain Rating: FLACC (Activity) - Activity 0   Pain Rating: FLACC (Activity) - Cry 1   Pain Rating: FLACC (Activity) - Consolability 0   Score: FLACC (Activity) 2   Restrictions/Precautions   Weight Bearing Precautions Per Order Yes   LLE Weight Bearing Per Order   (NWB left lower extremity in AO splint)   Other Precautions Chair Alarm;Bed Alarm;Fall Risk;Pain   Home Living   Type of Home House  (3 wide JULIEN (with low depth) L HR (daughter reporting that they could implement a ramp, if needed))   Home Layout Two level;Performs ADLs on one level;Able to live on main level with bedroom/bathroom  (2nd floor tub/shower with three bedrooms, FF to 2nd floor R HR)   Bathroom Shower/Tub Walk-in shower   Bathroom Toilet Raised   Bathroom Equipment Tub transfer bench  (Pt with hx of B hip replacements, has hip equipment at home but cannot find them. Also had a commode but gave it away)   Home Equipment Cane;Other (Comment)  (SW)   Additional Comments Pt reports living in a 2SH with FF setup and JULIEN. Pt wasn't  using AD for functional mobility PTA.   Prior Function   Level of [place] Independent with ADLs;Independent with functional mobility;Independent with IADLS   Lives With Spouse   Receives Help From Family   IADLs Independent with driving;Independent with meal prep;Independent with medication management   Falls in the last 6 months 1 to 4   Comments PTA, pt reports independence with ADLs, IADLs, and functional mobility without AD.   General   Family/Caregiver Present Yes  (Daughter)   ADL   UB Dressing Assistance 5  Supervision/Setup   LB Dressing Assistance 5  Supervision/Setup   LB Dressing Deficit Don/doff R sock   Additional Comments Pt able to doff/don the R sock without difficulty. Pt would require at least moderate assistance for any ADL in standing, with current NWB status of the LLE. UB/LB ADLs could be completed in seated with setup.   Bed Mobility   Supine to Sit 5  Supervision  (HOB flat, no bedrails)   Additional items Increased time required;Verbal cues   Additional Comments Pt received supine in bed upon start of session. Pt supine > sit EOB with supervision.   Transfers   Sit to Stand 3  Moderate assistance   Additional items Assist x 1;Increased time required;Verbal cues   Stand to Sit 4  Minimal assistance   Additional items Assist x 1;Increased time required;Verbal cues   Stand pivot 4  Minimal assistance   Additional items Assist x 1;Increased time required;Verbal cues   Additional Comments Functional transfers initially with RW. Pt sit > stand from EOB with mod assist x 1. Pt with difficulty maintaining NWB status of the LLE during sit > stand. Pt reporting she would be unable to hop on the RLE at this time. Pt shuffling the RLE to pivot into recliner chair with minimal assistance. Knee platform attachment initiated. Pt sit > stand from recliner chair with minimal assistance.   Functional Mobility   Functional Mobility   (Steadying assist)   Additional Comments With use of knee walker  platform attachment, pt able to participate in short functional distance in room with steadying assistance and RW. Pt able to maintain NWB status of the RLE. At end of session, pt with PT Alexis in room and all needs met.   Balance   Static Sitting Good   Dynamic Sitting Fair +   Static Standing Poor +   Dynamic Standing Poor +   Activity Tolerance   Activity Tolerance Patient limited by fatigue   Medical Staff Made Aware PT, Alexis   RUE Assessment   RUE Assessment WFL   LUE Assessment   LUE Assessment WFL   Hand Function   Gross Motor Coordination Functional   Fine Motor Coordination Functional   Hand Function Comments Pt is R hand dominant.   Sensation   Light Touch No apparent deficits   Vision-Basic Assessment   Current Vision Wears glasses only for reading   Cognition   Overall Cognitive Status WFL   Arousal/Participation Alert;Cooperative   Attention Within functional limits   Orientation Level Oriented X4   Memory Within functional limits   Following Commands Follows one step commands without difficulty   Assessment   Limitation Decreased ADL status;Decreased endurance;Decreased self-care trans;Decreased high-level ADLs   Prognosis Good   Assessment Pt is a 73 y.o. female, admitted to [facility]/20/2024 d/t experiencing ankle injury. Dx: Closed fracture of L ankle with routine healing. Pt with PMHx impacting their performance during ADL tasks, including: mixed HLD, HTN, prothrombin gene mutation, PE, blood clotting disorder. Prior to admission to the hospital Pt was performing ADLs without physical assistance. IADLs without physical assistance. Functional transfers/ambulation without physical assistance. Cognitive status PTA was WFL. OT order placed to assess Pt's ADLs, cognitive status, and performance during functional tasks in order to maximize safety and independence while making most appropriate d/c recommendations. PT/OT co-evaluation completed at this time d/t significant mobility deficits and safety  concerns. Pt's clinical presentation is currently unstable/unpredictable given new onset deficits that affect Pt's occupational performance and ability to safely return to PLOF including decrease activity tolerance, decrease standing balance, decrease performance during ADL tasks, increased pain, decrease performance during functional transfers, steps to enter home, limited family support, and high fall risk combined with medical complications of abnormal renal lab values, wounds, decreased skin integrity, fear/retreat, and need for input for mobility technique/safety. Personal factors affecting Pt at time of initial evaluation include: step(s) to enter environment, multi-level environment, limited home support, inability to perform IADLs, inability to perform ADLs, new need for AD, inability to ambulate household distances, inability to navigate community distances, limited insight into impairments, decreased initiation and engagement, and recent fall(s)/fall history. Pt will benefit from continued skilled OT services to address deficits as defined above and to maximize level independence/participation during ADLs and functional tasks to facilitate return toward PLOF and improved quality of life. From an occupational therapy standpoint, Level I (Maximum Resource Intensity) is recommended upon d/c.   Plan   Treatment Interventions ADL retraining;Functional transfer training;UE strengthening/ROM;Endurance training;Patient/family training;Equipment evaluation/education;Compensatory technique education;Continued evaluation;Energy conservation;Activityengagement   Goal Expiration Date 05/06/24   OT Frequency 3-5x/wk   Discharge Recommendation   Rehab Resource Intensity Level, OT I (Maximum Resource Intensity)   AM-PAC Daily Activity Inpatient   Lower Body Dressing 3   Bathing 3   Toileting 2   Upper Body Dressing 3   Grooming 3   Eating 4   Daily Activity Raw Score 18   Daily Activity Standardized Score (Calc for Raw  Score >=11) 38.66   AM-PAC Applied Cognition Inpatient   Following a Speech/Presentation 4   Understanding Ordinary Conversation 4   Taking Medications 4   Remembering Where Things Are Placed or Put Away 4   Remembering List of 4-5 Errands 4   Taking Care of Complicated Tasks 4   Applied Cognition Raw Score 24   Applied Cognition Standardized Score 62.21     The patient's raw score on the AM-PAC Daily Activity Inpatient Short Form is 18. A raw score of less than 19 suggests the patient may benefit from discharge to post-acute rehabilitation services. Please refer to the recommendation of the Occupational Therapist for safe discharge planning.    Pt goals to be met by 5/6/2024:    Pt will demonstrate ability to complete grooming/hygiene tasks @ mod I after set-up.  Pt will demonstrate ability to complete supine<>sit @ mod I in order to increase safety and independence during ADL tasks.  Pt will demonstrate ability to complete UB ADLs including washing/dressing @ mod I in order to increase performance and participation during meaningful tasks  Pt will demonstrate ability to complete LB dressing @ mod I in order to increase safety and independence during meaningful tasks.   Pt will demonstrate ability to ana m/doff socks/shoes while sitting EOB/chair @ mod I in order to increase safety and independence during meaningful tasks.   Pt will demonstrate ability to complete toileting tasks including CM and pericare @ mod I in order to increase safety and independence during meaningful tasks.  Pt will demonstrate ability to complete EOB, chair, toilet/commode transfers @ mod I in order to increase performance and participation during functional tasks.  Pt will demonstrate ability to stand for 5 minutes while maintaining F+ balance with use of RW for UB support PRN.  Pt will demonstrate ability to tolerate 15 minute OT session with no vc'ing for deep breathing or use of energy conservation techniques in order to increase  activity tolerance during functional tasks.   Pt will demonstrate Good carryover of use of energy conservation/compensatory strategies during ADLs and functional tasks in order to increase safety and reduce risk for falls.   Pt will demonstrate Good attention and participation in continued evaluation of functional ambulation house hold distances in order to assist with safe d/c planning.  Pt will attend to continued cognitive assessments 100% of the time in order to provide most appropriate d/c recommendations.   Pt will follow 100% simple 2-step commands and be A&O x4 consistently with environmental cues to increase participation in functional activities.   Pt will identify 3 areas of interest/hobbies and 1 intervention on how to incorporate into daily life in order to increase interaction with environment and peers as well as increase participation in meaningful tasks.   Pt will demonstrate 100% carryover of BUE HEP in order to increase BUE MS and increase performance during functional tasks upon d/c home.    Deniz Amador MS, OTR/L

## 2024-04-22 NOTE — ASSESSMENT & PLAN NOTE
She missed a step while going down her front porch.she had mechanical fall and fell sideways and felt pain in her left ankle.  Unable to stand afterwards despite help  xray shows trimalleolar fracture of the left ankle.  Consult Ortho.  Plan for surgical intervention Tuesday if swelling decreases.  Keep n.p.o. after midnight  Xarelto on hold.  Discussed with orthopedics placed on heparin for DVT prophylaxis labs and hemodynamics are stable.  Patient is medically cleared to proceed with surgical intervention from a medical standpoint

## 2024-04-22 NOTE — PLAN OF CARE
"  Problem: PHYSICAL THERAPY ADULT  Goal: Performs mobility at highest level of function for planned discharge setting.  See evaluation for individualized goals.  Description: Treatment/Interventions: Functional transfer training, LE strengthening/ROM, Elevations, ADL retraining, Therapeutic exercise, Endurance training, Patient/family training, Equipment eval/education, Bed mobility, Gait training, Compensatory technique education, OT          See flowsheet documentation for full assessment, interventions and recommendations.  4/22/2024 1608 by Alexis Hendricks PT  Outcome: Progressing  Note: Prognosis: Good  Problem List: Decreased strength, Decreased endurance, Impaired balance, Decreased mobility, Pain, Orthopedic restrictions, Decreased skin integrity, Obesity  Pt tolerates tx session fair.  Interventions consisting of transfer training, gait training, bed mobility, and pt/family education.  Pt limited by NWB status of L LE and difficulty with maintaining adherence to that restriction.  PT introduces knee platform attachment for RW with improved functional mobility and pt's ability to \"hop\" using the RW.  Pt does not report any SOB, dizziness, or lightheadedness throughout.  PT takes extensive time to educate pt and dtr on level 1 resource intensity recommendation.  Barriers to Discharge: Other (Comment)  Barriers to Discharge Comments: NWB status L LE; current assistance for mobility; JULIEN  Rehab Resource Intensity Level, PT: I (Maximum Resource Intensity)    See flowsheet documentation for full assessment.        "

## 2024-04-22 NOTE — ASSESSMENT & PLAN NOTE
History Of DVT in the past.  Known history of prothrombin gene mutation.  Currently on Xarelto 10 mg daily.  On hold for now.  Currently on heparin for DVT prophylaxis

## 2024-04-22 NOTE — PROGRESS NOTES
[facility]  Progress Note  Name: Karly Helton I  MRN: [id number]  Unit/Bed#: -01 I Date of Admission: 4/20/2024   Date of Service: 4/22/2024 I Hospital Day: 2    Assessment/Plan   * Closed fracture of left ankle with routine healing  Assessment & Plan  She missed a step while going down her front porch.she had mechanical fall and fell sideways and felt pain in her left ankle.  Unable to stand afterwards despite help  xray shows trimalleolar fracture of the left ankle.  Consult Ortho.  Plan for surgical intervention Tuesday if swelling decreases.  Keep n.p.o. after midnight  Xarelto on hold.  Discussed with orthopedics placed on heparin for DVT prophylaxis labs and hemodynamics are stable.  Patient is medically cleared to proceed with surgical intervention from a medical standpoint    Mixed hyperlipidemia  Assessment & Plan  Continue statin therapy    Essential hypertension  Assessment & Plan  Continue atenolol as per home regimen continue pain control    Prothrombin gene mutation (HCC)  Assessment & Plan  History Of DVT in the past.  Known history of prothrombin gene mutation.  Currently on Xarelto 10 mg daily.  On hold for now.  Currently on heparin for DVT prophylaxis       VTE Pharmacologic Prophylaxis:   Moderate Risk (Score 3-4) - Pharmacological DVT Prophylaxis Ordered: heparin.    Mobility:   Basic Mobility Inpatient Raw Score: 12  JH-HLM Goal: 4: Move to chair/commode  JH-HLM Achieved: 2: Bed activities/Dependent transfer  JH-HLM Goal achieved. Continue to encourage appropriate mobility.    Patient Centered Rounds: I performed bedside rounds with nursing staff today.   Discussions with Specialists or Other Care Team Provider: courtney ortho    Education and Discussions with Family / Patient: Updated  (daughter) at bedside.    Total Time Spent on Date of Encounter in care of patient: 35 mins. This time was spent on one or more of the following: performing physical  exam; counseling and coordination of care; obtaining or reviewing history; documenting in the medical record; reviewing/ordering tests, medications or procedures; communicating with other healthcare professionals and discussing with patient's family/caregivers.    Current Length of Stay: 2 day(s)  Current Patient Status: Inpatient   Certification Statement: The patient will continue to require additional inpatient hospital stay due to left trimalleolar fracture  Discharge Plan: Anticipate discharge in 48-72 hrs to discharge location to be determined pending rehab evaluations.    Code Status: Level 1 - Full Code    Subjective:   Patient states the pain in her left foot is controllable noted to have a some swelling and blistering today and plan for surgery canceled.  Will have Ortho reassess again tomorrow family and patient understand course of care    Objective:     Vitals:   Temp (24hrs), Av.5 °F (36.4 °C), Min:97.2 °F (36.2 °C), Max:97.8 °F (36.6 °C)    Temp:  [97.2 °F (36.2 °C)-97.8 °F (36.6 °C)] 97.7 °F (36.5 °C)  HR:  [68-76] 70  Resp:  [18-20] 18  BP: (148-150)/(87-90) 148/87  SpO2:  [95 %-97 %] 97 %  Body mass index is 39.6 kg/m².     Input and Output Summary (last 24 hours):     Intake/Output Summary (Last 24 hours) at 2024 1257  Last data filed at 2024 1104  Gross per 24 hour   Intake 840 ml   Output 350 ml   Net 490 ml       Physical Exam:   Physical Exam  Vitals and nursing note reviewed.   Constitutional:       Appearance: Normal appearance.   HENT:      Head: Normocephalic and atraumatic.      Right Ear: External ear normal.      Left Ear: External ear normal.      Nose: Nose normal.      Mouth/Throat:      Pharynx: Oropharynx is clear.   Cardiovascular:      Rate and Rhythm: Normal rate and regular rhythm.      Heart sounds: Normal heart sounds.   Pulmonary:      Effort: Pulmonary effort is normal.      Breath sounds: Normal breath sounds.   Abdominal:      General: Bowel sounds are  normal.      Palpations: Abdomen is soft.      Tenderness: There is no abdominal tenderness.   Musculoskeletal:      Cervical back: Normal range of motion and neck supple.      Comments: Left foot splint   Skin:     General: Skin is warm and dry.      Capillary Refill: Capillary refill takes less than 2 seconds.   Neurological:      General: No focal deficit present.      Mental Status: She is alert and oriented to person, place, and time.   Psychiatric:         Mood and Affect: Mood normal.            Additional Data:     Labs:  Results from last 7 days   Lab Units 04/21/24  0510 04/20/24  1530   WBC Thousand/uL 8.51 8.11   HEMOGLOBIN g/dL 12.9 13.3   HEMATOCRIT % 38.8 40.6   PLATELETS Thousands/uL 164 185   SEGS PCT %  --  63   LYMPHO PCT %  --  24   MONO PCT %  --  10   EOS PCT %  --  2     Results from last 7 days   Lab Units 04/21/24  0510   SODIUM mmol/L 140   POTASSIUM mmol/L 3.8   CHLORIDE mmol/L 109*   CO2 mmol/L 25   BUN mg/dL 24   CREATININE mg/dL 0.94   ANION GAP mmol/L 6   CALCIUM mg/dL 9.5   ALBUMIN g/dL 3.7   TOTAL BILIRUBIN mg/dL 0.60   ALK PHOS U/L 41   ALT U/L 13   AST U/L 19   GLUCOSE RANDOM mg/dL 101     Results from last 7 days   Lab Units 04/21/24  0510   INR  0.98                   Lines/Drains:  Invasive Devices       Peripheral Intravenous Line  Duration             Peripheral IV 04/20/24 Right;Ventral (anterior) Forearm 1 day              Drain  Duration             External Urinary Catheter <1 day                          Imaging: Reviewed radiology reports from this admission including: xray(s)    Recent Cultures (last 7 days):         Last 24 Hours Medication List:   Current Facility-Administered Medications   Medication Dose Route Frequency Provider Last Rate    acetaminophen  650 mg Oral Q6H PRN Zaida Hayes MD      atenolol  25 mg Oral QAM Zaida Hayes MD      ezetimibe  10 mg Oral Daily Zaida Hayes MD      heparin (porcine)  5,000 Units Subcutaneous Q8H [facility] Zaida Hayes MD       morphine injection  2 mg Intravenous Q6H PRN Zaida Hayes MD      ondansetron  4 mg Intravenous Q6H PRN Zaida Hayes MD      oxyCODONE  5 mg Oral Q6H PRN Zaida Hayes MD      pravastatin  20 mg Oral Daily With Dinner Zaida Hayes MD          Today, Patient Was Seen By: Zaida Hayes MD    **Please Note: This note may have been constructed using a voice recognition system.**

## 2024-04-22 NOTE — CASE MANAGEMENT
Case Management Assessment & Discharge Planning Note    Patient name Karly Helton  Location /-01 MRN [id number]  : 1950 Date 2024       Current Admission Date: 2024  Current Admission Diagnosis:Closed fracture of left ankle with routine healing   Patient Active Problem List    Diagnosis Date Noted    Closed fracture of left ankle with routine healing 2024    Prothrombin gene mutation (HCC) 2024    Essential hypertension 2024    Mixed hyperlipidemia 2024      LOS (days): 2  Geometric Mean LOS (GMLOS) (days):   Days to GMLOS:     OBJECTIVE:    Risk of Unplanned Readmission Score: 8.19         Current admission status: Inpatient  Referral Reason: Other (discharge planning)    Preferred Pharmacy:   [facility]/pharmacy #[id number] - [place]  [address] [place] [id number]  Phone: [phone] Fax: [phone]    Primary Care Provider: No primary care provider on file.    Primary Insurance: [employer] RE[employer]  Secondary Insurance:     ASSESSMENT:  Active Health Care Proxies    There are no active Health Care Proxies on file.       Advance Directives  Does patient have a Health Care POA?: Yes  Does patient have Advance Directives?: Yes  Advance Directives: Living will  Primary Contact: Jen Helton (daughter)         Readmission Root Cause  30 Day Readmission: No    Patient Information  Admitted from:: Home  Mental Status: Alert  During Assessment patient was accompanied by: Daughter  Assessment information provided by:: Patient  Primary Caregiver: Self  Support Systems: Spouse/significant other, Daughter  County of Residence: [place]  What city do you live in?: [place]  Home entry access options. Select all that apply.: Stairs, Garage  Number of steps to enter home.: 3  Do the steps have railings?: Yes  Type of Current Residence: [address] [place] [address] home  Upon entering residence, is there a bedroom on the main floor (no further steps)?:  Yes  Upon entering residence, is there a bathroom on the main floor (no further steps)?: Yes  Living Arrangements: Lives w/ Spouse/significant other  Is patient a ?: No    Activities of Daily Living Prior to Admission  Functional Status: Independent  Completes ADLs independently?: Yes  Ambulates independently?: Yes  Does patient use assisted devices?: No  Does patient currently own DME?: Yes  What DME does the patient currently own?: Shower Chair, Walker  Does patient have a history of Outpatient Therapy (PT/OT)?: Yes (Dr. Beat[facility])  Does the patient have a history of Short-Term Rehab?: No  Does patient have a history of HHC?: Yes  Does patient currently have HHC?: No         Patient Information Continued  Income Source: SSI/SSD  Does patient have prescription coverage?: Yes  Does patient receive dialysis treatments?: No  Does patient have a history of substance abuse?: No  Does patient have a history of Mental Health Diagnosis?: No         Means of Transportation  Means of Transport to Appts:: Drives Self      Social Determinants of Health (SDOH)      Flowsheet Row Most Recent Value   Housing Stability    In the last 12 months, was there a time when you were not able to pay the mortgage or rent on time? N   In the last 12 months, how many places have you lived? 1   In the last 12 months, was there a time when you did not have a steady place to sleep or slept in a shelter (including now)? N   Transportation Needs    In the past 12 months, has lack of transportation kept you from medical appointments or from getting medications? no   In the past 12 months, has lack of transportation kept you from meetings, work, or from getting things needed for daily living? No   Food Insecurity    Within the past 12 months, you worried that your food would run out before you got the money to buy more. Never true   Within the past 12 months, the food you bought just didn't last  and you didn't have money to get more. Never true   Utilities    In the past 12 months has the electric, gas, oil, or water company threatened to shut off services in your home? No            DISCHARGE DETAILS:    Discharge planning discussed with:: Patient, Daughter  Freedom of Choice: Yes  Comments - Freedom of Choice: Agreeable to all LOC with blanket referrals and [employer] for DME if needed.  CM contacted family/caregiver?: Yes  Were Treatment Team discharge recommendations reviewed with patient/caregiver?: Yes  Did patient/caregiver verbalize understanding of patient care needs?: Yes  Were patient/caregiver advised of the risks associated with not following Treatment Team discharge recommendations?: Yes    Contacts  Patient Contacts: Jen - Daughter  Relationship to Patient:: Family  Contact Method: In Person  Reason/Outcome: Continuity of Care, Discharge Planning       CM met with patient and family at the bedside,baseline information  was obtained. CM discussed the role of CM in helping the patient develop a discharge plan and assist the patient in carry out their plan. Patient lives with spouse in a 2 floor home with 3 small 6 inch steps to enter at the garage. Patient is set up on 1st floor with bedroom and bathroom. Patient's daughter is here from [place] to assist as needed. Patient uses no DME at baseline and was IPTA. Patient is agreeable to all LOC and discharge planning recommendations of therapy once assessments completed.     CM completed referral for acute rehab in [facility] based on patient's location.    CM to follow patient's care and discharge needs.

## 2024-04-22 NOTE — PHYSICAL THERAPY NOTE
PHYSICAL THERAPY EVALUATION      NAME:  Karly Helton    DATE: 04/22/24    AGE:   73 y.o.  Mrn:   [id number]  ADMIT DX:  Closed trimalleolar fracture of left ankle, initial encounter [S82.809N]  Ambulatory dysfunction [R26.2]    Past Medical History:   Diagnosis Date    Blood clotting disorder (HCC)     History of pulmonary embolus (PE)     Hypertension      Length Of Stay: 2  Performed at least 2 patient identifiers during session: Name and Birthday           PHYSICAL THERAPY EVALUATION:       04/22/24 0949   PT Last Visit   PT Visit Date 04/22/24   Note Type   Note type Evaluation   Pain Assessment   Pain Assessment Tool 0-10   Pain Score 3   Pain Location/Orientation Orientation: Left;Orientation: Lower;Location: Leg   Restrictions/Precautions   Weight Bearing Precautions Per Order Yes   LLE Weight Bearing Per Order NWB   Other Precautions Chair Alarm;Bed Alarm;Fall Risk;Pain;WBS   Home Living   Type of Home House   Home Layout Two level;Performs ADLs on one level;Able to live on main level with bedroom/bathroom  (3 JULIEN L railing; FF steps to 2nd flr single rail)   Bathroom Shower/Tub Walk-in shower   Bathroom Toilet Raised   Bathroom Equipment Tub transfer bench   Home Equipment Cane  (standard walker without wheels)   Prior Function   Level of [place] Independent with ADLs;Independent with functional mobility;Independent with IADLS   Lives With Spouse   Receives Help From Family   IADLs Independent with driving;Independent with meal prep;Independent with medication management   Falls in the last 6 months 1 to 4   Comments IND no AD   General   Family/Caregiver Present Yes  (dtr)   Cognition   Overall Cognitive Status WFL   Arousal/Participation Cooperative   Attention Within functional limits   Orientation Level Oriented X4   Following Commands Follows one step commands without difficulty   RLE Assessment   RLE Assessment WFL   LLE Assessment   LLE Assessment X   Strength LLE   L Hip Flexion 3-/5   L  Knee Extension 3-/5   L Ankle Dorsiflexion   (not tested - in splint)   Light Touch   RLE Light Touch Grossly intact   LLE Light Touch Grossly intact   Bed Mobility   Supine to Sit 5  Supervision   Additional items Increased time required;Verbal cues   Transfers   Sit to Stand 3  Moderate assistance   Additional items Assist x 1;Increased time required;Verbal cues   Stand to Sit 4  Minimal assistance   Additional items Assist x 1;Increased time required;Verbal cues   Stand pivot 4  Minimal assistance   Additional items Assist x 1;Increased time required;Verbal cues   Additional Comments Using RW.  Pt with difficulty maintaining NWB status L LE.   Ambulation/Elevation   Gait Assistance   (attempted but pt unable to hop on R LE)   Stair Management Assistance Not tested   Additional items   (unsafe to trial at this time)   Balance   Static Sitting Good   Dynamic Sitting Fair +   Static Standing Poor +   Dynamic Standing Poor   Activity Tolerance   Activity Tolerance Patient limited by fatigue  (and WBing restriction)   Medical Staff Made Aware OT Candace   Assessment   Prognosis Good   Problem List Decreased strength;Decreased endurance;Impaired balance;Decreased mobility;Pain;Orthopedic restrictions;Decreased skin integrity;Obesity   Barriers to Discharge Other (Comment)   Barriers to Discharge Comments NWB status L LE; current assistance for mobility; JULIEN   Goals   Patient Goals get better   STG Expiration Date 05/06/24   PT Treatment Day 1   Plan   Treatment/Interventions Functional transfer training;LE strengthening/ROM;Elevations;ADL retraining;Therapeutic exercise;Endurance training;Patient/family training;Equipment eval/education;Bed mobility;Gait training;Compensatory technique education;OT   PT Frequency 3-5x/wk   Discharge Recommendation   Rehab Resource Intensity Level, PT I (Maximum Resource Intensity)   AM-PAC Basic Mobility Inpatient   Turning in Flat Bed Without Bedrails 3   Lying on Back to Sitting on  "Edge of Flat Bed Without Bedrails 3   Moving Bed to Chair 2   Standing Up From Chair Using Arms 2   Walk in Room 3   Climb 3-5 Stairs With Railing 1   Basic Mobility Inpatient Raw Score 14   Basic Mobility Standardized Score 35.55   [facility] Highest Level Of Mobility   -HL Goal 4: Move to chair/commode   -HL Achieved 6: Walk 10 steps or more   Additional Treatment Session   Start Time 1021   End Time 1047   Treatment Assessment Pt tolerates tx session fair.  Interventions consisting of transfer training, gait training, bed mobility, and pt/family education.  Pt limited by NWB status of L LE and difficulty with maintaining adherence to that restriction.  PT introduces knee platform attachment for RW with improved functional mobility and pt's ability to \"hop\" using the RW.  Pt does not report any SOB, dizziness, or lightheadedness throughout.  PT takes extensive time to educate pt and dtr on level 1 resource intensity recommendation.   Equipment Use Pt performs sit to stands up to RW with L knee platform attachment with min A x 1 and VCs.  Pt ambulates with RW with L knee platform attachment 12 ft x 1, steadying assist.  Pt performs SPT using RW without knee platform min A x 1 with continuous cues for NWB adherence.  Pt performs sit to supine with min A.   End of Consult   Patient Position at End of Consult Supine;Bed/Chair alarm activated;All needs within reach  (L LE elevated on pillows)   End of Consult Comments dtr present     (Please find full objective findings from PT assessment regarding body systems outlined above).     Assessment: Pt is a 73 y.o. female seen for PT evaluation s/p admission to [facility] on 4/20/2024 with Closed fracture of left ankle with routine healing.  Order placed for PT services.  Upon evaluation: Pt is presenting with impaired functional mobility due to pain, decreased strength, decreased endurance, impaired balance, gait deviations, orthopedic " restrictions, fall risk, and impaired skin integrity requiring  SPV - min assistance for bed mobility and min - mod assistance for transfers and ambulation with RW . Pt's clinical presentation is currently unstable/unpredictable given the functional mobility deficits above, especially decreased activity tolerance, coupled with fall risks as indicated by -PAC 6-Clicks: 14/24 as well as hx of falls and obesity and combined with medical complications of pain impacting overall mobility status, need for input for mobility technique/safety, and NWB status L LE .  Pt's PMHx and comorbidities that may affect physical performance and progress include: h/o PE and HTN. Personal factors affecting pt at time of IE include: inaccessible home environment, step(s) to enter environment, multi-level environment, inability to perform IADLs, inability to perform ADLs, inability to navigate level surfaces without external assistance, inability to ambulate household distances, inability to navigate community distances, and recent fall(s)/fall history. Pt will benefit from continued skilled PT services to address deficits as defined above and to maximize level of functional mobility to facilitate return toward PLOF and improved QOL. From PT/mobility standpoint, recommendation at time of d/c would be Level I (Maximum Resource Intensity) pending progress in order to reduce fall risk and maximize pt's functional independence and consistency with mobility in order to facilitate return to PLOF.  Recommend trial with walker next 1-2 sessions and ther ex next 1-2 sessions.     The patient's -[facility] Basic Mobility Inpatient Short Form Raw Score is 14. A Raw score of less than or equal to 16 suggests the patient may benefit from discharge to post-acute rehabilitation services. Please also refer to the recommendation of the Physical Therapist for safe discharge planning.       Goals: Pt will: Perform bed mobility tasks with modified I to  reposition in bed and prepare for transfers. Pt will perform transfers with modified I to increase Indep in home environment and decrease burden of care and prepare for ambulation. Pt will ambulate with RW using L knee platform attachment for >/= 50 ft with  Supervision  to decrease risk for falls, improve activity tolerance, and promote proper use of assistive device and to access home environment. Pt will complete >/= 3 steps with with bilateral handrails with consistent A of 1 to return to home with JULIEN. Pt will participate in objective balance assessment to determine baseline fall risk.        Alexis Hendricks, PT,DPT

## 2024-04-23 ENCOUNTER — APPOINTMENT (INPATIENT)
Dept: RADIOLOGY | Facility: HOSPITAL | Age: 74
DRG: 493 | End: 2024-04-23
Payer: COMMERCIAL

## 2024-04-23 ENCOUNTER — ANESTHESIA (INPATIENT)
Dept: PERIOP | Facility: HOSPITAL | Age: 74
DRG: 493 | End: 2024-04-23
Payer: COMMERCIAL

## 2024-04-23 ENCOUNTER — ANESTHESIA EVENT (INPATIENT)
Dept: PERIOP | Facility: HOSPITAL | Age: 74
DRG: 493 | End: 2024-04-23
Payer: COMMERCIAL

## 2024-04-23 PROCEDURE — 99232 SBSQ HOSP IP/OBS MODERATE 35: CPT | Performed by: HOSPITALIST

## 2024-04-23 PROCEDURE — 0QSH04Z REPOSITION LEFT TIBIA WITH INTERNAL FIXATION DEVICE, OPEN APPROACH: ICD-10-PCS | Performed by: ORTHOPAEDIC SURGERY

## 2024-04-23 PROCEDURE — C1713 ANCHOR/SCREW BN/BN,TIS/BN: HCPCS | Performed by: ORTHOPAEDIC SURGERY

## 2024-04-23 PROCEDURE — C1769 GUIDE WIRE: HCPCS | Performed by: ORTHOPAEDIC SURGERY

## 2024-04-23 PROCEDURE — 27822 TREATMENT OF ANKLE FRACTURE: CPT | Performed by: PHYSICIAN ASSISTANT

## 2024-04-23 PROCEDURE — NC001 PR NO CHARGE: Performed by: ORTHOPAEDIC SURGERY

## 2024-04-23 PROCEDURE — C9290 INJ, BUPIVACAINE LIPOSOME: HCPCS | Performed by: ANESTHESIOLOGY

## 2024-04-23 PROCEDURE — 27822 TREATMENT OF ANKLE FRACTURE: CPT | Performed by: ORTHOPAEDIC SURGERY

## 2024-04-23 PROCEDURE — 73610 X-RAY EXAM OF ANKLE: CPT

## 2024-04-23 PROCEDURE — 0QSK04Z REPOSITION LEFT FIBULA WITH INTERNAL FIXATION DEVICE, OPEN APPROACH: ICD-10-PCS | Performed by: ORTHOPAEDIC SURGERY

## 2024-04-23 PROCEDURE — 0SCG0ZZ EXTIRPATION OF MATTER FROM LEFT ANKLE JOINT, OPEN APPROACH: ICD-10-PCS | Performed by: ORTHOPAEDIC SURGERY

## 2024-04-23 DEVICE — 3.5MM CORTEX SCREW SELF-TAPPING 14MM: Type: IMPLANTABLE DEVICE | Site: ANKLE | Status: FUNCTIONAL

## 2024-04-23 DEVICE — 3.5MM CORTEX SCREW SELF-TAPPING 16MM: Type: IMPLANTABLE DEVICE | Site: ANKLE | Status: FUNCTIONAL

## 2024-04-23 DEVICE — 3.5MM CORTEX SCREW SELF-TAPPING 12MM: Type: IMPLANTABLE DEVICE | Site: ANKLE | Status: FUNCTIONAL

## 2024-04-23 DEVICE — 2.7MM CORTEX SCREW SELF-TAPPING 18MM: Type: IMPLANTABLE DEVICE | Site: ANKLE | Status: FUNCTIONAL

## 2024-04-23 DEVICE — WASHER 7.0MM: Type: IMPLANTABLE DEVICE | Site: ANKLE | Status: FUNCTIONAL

## 2024-04-23 DEVICE — 2.7MM CORTEX SCREW SELF-TAPPING 16MM: Type: IMPLANTABLE DEVICE | Site: ANKLE | Status: FUNCTIONAL

## 2024-04-23 DEVICE — 2.7MM/3.5MM LCP LATERAL DISTAL FIBULA PLATE 4H/LEFT/86MM
Type: IMPLANTABLE DEVICE | Site: ANKLE | Status: FUNCTIONAL
Brand: LCP

## 2024-04-23 DEVICE — 4.0MM CANNULATED SCREW SHORT THREAD/50MM: Type: IMPLANTABLE DEVICE | Site: ANKLE | Status: FUNCTIONAL

## 2024-04-23 DEVICE — 4.0MM CANNULATED SCREW SHORT THREAD/20MM: Type: IMPLANTABLE DEVICE | Site: ANKLE | Status: FUNCTIONAL

## 2024-04-23 RX ORDER — LIDOCAINE HYDROCHLORIDE 10 MG/ML
INJECTION, SOLUTION EPIDURAL; INFILTRATION; INTRACAUDAL; PERINEURAL AS NEEDED
Status: DISCONTINUED | OUTPATIENT
Start: 2024-04-23 | End: 2024-04-23

## 2024-04-23 RX ORDER — ONDANSETRON 2 MG/ML
INJECTION INTRAMUSCULAR; INTRAVENOUS AS NEEDED
Status: DISCONTINUED | OUTPATIENT
Start: 2024-04-23 | End: 2024-04-23

## 2024-04-23 RX ORDER — SCOLOPAMINE TRANSDERMAL SYSTEM 1 MG/1
1 PATCH, EXTENDED RELEASE TRANSDERMAL
Status: DISCONTINUED | OUTPATIENT
Start: 2024-04-23 | End: 2024-04-23

## 2024-04-23 RX ORDER — FENTANYL CITRATE 50 UG/ML
INJECTION, SOLUTION INTRAMUSCULAR; INTRAVENOUS AS NEEDED
Status: DISCONTINUED | OUTPATIENT
Start: 2024-04-23 | End: 2024-04-23

## 2024-04-23 RX ORDER — PHENYLEPHRINE HCL IN 0.9% NACL 1 MG/10 ML
SYRINGE (ML) INTRAVENOUS AS NEEDED
Status: DISCONTINUED | OUTPATIENT
Start: 2024-04-23 | End: 2024-04-23

## 2024-04-23 RX ORDER — BUPIVACAINE HYDROCHLORIDE 5 MG/ML
INJECTION, SOLUTION EPIDURAL; INTRACAUDAL AS NEEDED
Status: DISCONTINUED | OUTPATIENT
Start: 2024-04-23 | End: 2024-04-23 | Stop reason: HOSPADM

## 2024-04-23 RX ORDER — ACETAMINOPHEN 325 MG/1
975 TABLET ORAL EVERY 8 HOURS SCHEDULED
Status: DISCONTINUED | OUTPATIENT
Start: 2024-04-23 | End: 2024-04-26 | Stop reason: HOSPADM

## 2024-04-23 RX ORDER — CEFAZOLIN SODIUM 2 G/50ML
2000 SOLUTION INTRAVENOUS ONCE
Status: COMPLETED | OUTPATIENT
Start: 2024-04-23 | End: 2024-04-23

## 2024-04-23 RX ORDER — DEXAMETHASONE SODIUM PHOSPHATE 10 MG/ML
INJECTION, SOLUTION INTRAMUSCULAR; INTRAVENOUS AS NEEDED
Status: DISCONTINUED | OUTPATIENT
Start: 2024-04-23 | End: 2024-04-23

## 2024-04-23 RX ORDER — ROCURONIUM BROMIDE 10 MG/ML
INJECTION, SOLUTION INTRAVENOUS AS NEEDED
Status: DISCONTINUED | OUTPATIENT
Start: 2024-04-23 | End: 2024-04-23

## 2024-04-23 RX ORDER — SODIUM CHLORIDE, SODIUM LACTATE, POTASSIUM CHLORIDE, CALCIUM CHLORIDE 600; 310; 30; 20 MG/100ML; MG/100ML; MG/100ML; MG/100ML
100 INJECTION, SOLUTION INTRAVENOUS CONTINUOUS
Status: DISCONTINUED | OUTPATIENT
Start: 2024-04-23 | End: 2024-04-26 | Stop reason: HOSPADM

## 2024-04-23 RX ORDER — EPHEDRINE SULFATE 50 MG/ML
INJECTION INTRAVENOUS AS NEEDED
Status: DISCONTINUED | OUTPATIENT
Start: 2024-04-23 | End: 2024-04-23

## 2024-04-23 RX ORDER — OXYCODONE HYDROCHLORIDE 10 MG/1
10 TABLET ORAL EVERY 6 HOURS PRN
Status: DISCONTINUED | OUTPATIENT
Start: 2024-04-23 | End: 2024-04-26 | Stop reason: HOSPADM

## 2024-04-23 RX ORDER — BUPIVACAINE HYDROCHLORIDE 5 MG/ML
INJECTION, SOLUTION EPIDURAL; INTRACAUDAL
Status: COMPLETED | OUTPATIENT
Start: 2024-04-23 | End: 2024-04-23

## 2024-04-23 RX ORDER — FENTANYL CITRATE/PF 50 MCG/ML
25 SYRINGE (ML) INJECTION
Status: DISCONTINUED | OUTPATIENT
Start: 2024-04-23 | End: 2024-04-23 | Stop reason: HOSPADM

## 2024-04-23 RX ORDER — MAGNESIUM HYDROXIDE 1200 MG/15ML
LIQUID ORAL AS NEEDED
Status: DISCONTINUED | OUTPATIENT
Start: 2024-04-23 | End: 2024-04-23 | Stop reason: HOSPADM

## 2024-04-23 RX ORDER — CEFAZOLIN SODIUM 2 G/50ML
2000 SOLUTION INTRAVENOUS EVERY 8 HOURS
Status: COMPLETED | OUTPATIENT
Start: 2024-04-23 | End: 2024-04-24

## 2024-04-23 RX ORDER — PROPOFOL 10 MG/ML
INJECTION, EMULSION INTRAVENOUS AS NEEDED
Status: DISCONTINUED | OUTPATIENT
Start: 2024-04-23 | End: 2024-04-23

## 2024-04-23 RX ORDER — PROPOFOL 10 MG/ML
INJECTION, EMULSION INTRAVENOUS CONTINUOUS PRN
Status: DISCONTINUED | OUTPATIENT
Start: 2024-04-23 | End: 2024-04-23

## 2024-04-23 RX ORDER — HYDROMORPHONE HCL IN WATER/PF 6 MG/30 ML
0.2 PATIENT CONTROLLED ANALGESIA SYRINGE INTRAVENOUS
Status: DISCONTINUED | OUTPATIENT
Start: 2024-04-23 | End: 2024-04-23 | Stop reason: HOSPADM

## 2024-04-23 RX ORDER — SODIUM CHLORIDE, SODIUM LACTATE, POTASSIUM CHLORIDE, CALCIUM CHLORIDE 600; 310; 30; 20 MG/100ML; MG/100ML; MG/100ML; MG/100ML
INJECTION, SOLUTION INTRAVENOUS CONTINUOUS PRN
Status: DISCONTINUED | OUTPATIENT
Start: 2024-04-23 | End: 2024-04-23

## 2024-04-23 RX ORDER — ONDANSETRON 2 MG/ML
4 INJECTION INTRAMUSCULAR; INTRAVENOUS ONCE AS NEEDED
Status: DISCONTINUED | OUTPATIENT
Start: 2024-04-23 | End: 2024-04-23 | Stop reason: HOSPADM

## 2024-04-23 RX ADMIN — PROPOFOL 200 MG: 10 INJECTION, EMULSION INTRAVENOUS at 13:38

## 2024-04-23 RX ADMIN — Medication 100 MCG: at 14:18

## 2024-04-23 RX ADMIN — FENTANYL CITRATE 25 MCG: 50 INJECTION INTRAMUSCULAR; INTRAVENOUS at 13:38

## 2024-04-23 RX ADMIN — CEFAZOLIN SODIUM 2000 MG: 2 SOLUTION INTRAVENOUS at 13:37

## 2024-04-23 RX ADMIN — BUPIVACAINE 10 ML: 13.3 INJECTION, SUSPENSION, LIPOSOMAL INFILTRATION at 12:10

## 2024-04-23 RX ADMIN — Medication 100 MCG: at 14:08

## 2024-04-23 RX ADMIN — ROCURONIUM BROMIDE 40 MG: 10 INJECTION, SOLUTION INTRAVENOUS at 13:38

## 2024-04-23 RX ADMIN — BUPIVACAINE 20 ML: 13.3 INJECTION, SUSPENSION, LIPOSOMAL INFILTRATION at 13:00

## 2024-04-23 RX ADMIN — PROPOFOL 100 MCG/KG/MIN: 10 INJECTION, EMULSION INTRAVENOUS at 13:41

## 2024-04-23 RX ADMIN — ATENOLOL 25 MG: 25 TABLET ORAL at 08:02

## 2024-04-23 RX ADMIN — ONDANSETRON 4 MG: 2 INJECTION INTRAMUSCULAR; INTRAVENOUS at 13:42

## 2024-04-23 RX ADMIN — SCOPALAMINE 1 PATCH: 1 PATCH, EXTENDED RELEASE TRANSDERMAL at 12:40

## 2024-04-23 RX ADMIN — SODIUM CHLORIDE, SODIUM LACTATE, POTASSIUM CHLORIDE, AND CALCIUM CHLORIDE: .6; .31; .03; .02 INJECTION, SOLUTION INTRAVENOUS at 14:59

## 2024-04-23 RX ADMIN — Medication 100 MCG: at 13:51

## 2024-04-23 RX ADMIN — DEXAMETHASONE SODIUM PHOSPHATE 10 MG: 10 INJECTION, SOLUTION INTRAMUSCULAR; INTRAVENOUS at 13:42

## 2024-04-23 RX ADMIN — BUPIVACAINE HYDROCHLORIDE 20 ML: 5 INJECTION, SOLUTION EPIDURAL; INTRACAUDAL at 12:38

## 2024-04-23 RX ADMIN — ROCURONIUM BROMIDE 10 MG: 10 INJECTION, SOLUTION INTRAVENOUS at 13:58

## 2024-04-23 RX ADMIN — BUPIVACAINE HYDROCHLORIDE 20 ML: 5 INJECTION, SOLUTION EPIDURAL; INTRACAUDAL; PERINEURAL at 12:10

## 2024-04-23 RX ADMIN — EPHEDRINE SULFATE 15 MG: 50 INJECTION, SOLUTION INTRAVENOUS at 13:49

## 2024-04-23 RX ADMIN — CEFAZOLIN SODIUM 2000 MG: 2 SOLUTION INTRAVENOUS at 21:06

## 2024-04-23 RX ADMIN — LIDOCAINE HYDROCHLORIDE 50 MG: 10 INJECTION, SOLUTION EPIDURAL; INFILTRATION; INTRACAUDAL; PERINEURAL at 13:38

## 2024-04-23 RX ADMIN — EZETIMIBE 10 MG: 10 TABLET ORAL at 08:02

## 2024-04-23 RX ADMIN — SODIUM CHLORIDE, SODIUM LACTATE, POTASSIUM CHLORIDE, AND CALCIUM CHLORIDE: .6; .31; .03; .02 INJECTION, SOLUTION INTRAVENOUS at 13:33

## 2024-04-23 RX ADMIN — SUGAMMADEX 200 MG: 100 INJECTION, SOLUTION INTRAVENOUS at 15:06

## 2024-04-23 NOTE — PLAN OF CARE
Problem: PAIN - ADULT  Goal: Verbalizes/displays adequate comfort level or baseline comfort level  Description: Interventions:  - Encourage patient to monitor pain and request assistance  - Assess pain using appropriate pain scale  - Administer analgesics based on type and severity of pain and evaluate response  - Implement non-pharmacological measures as appropriate and evaluate response  - Consider cultural and social influences on pain and pain management  - Notify physician/advanced practitioner if interventions unsuccessful or patient reports new pain  Outcome: Progressing     Problem: INFECTION - ADULT  Goal: Absence or prevention of progression during hospitalization  Description: INTERVENTIONS:  - Assess and monitor for signs and symptoms of infection  - Monitor lab/diagnostic results  - Monitor all insertion sites, i.e. indwelling lines, tubes, and drains  - Monitor endotracheal if appropriate and nasal secretions for changes in amount and color  - [place] appropriate cooling/warming therapies per order  - Administer medications as ordered  - Instruct and encourage patient and family to use good hand hygiene technique  - Identify and instruct in appropriate isolation precautions for identified infection/condition  Outcome: Progressing     Problem: SAFETY ADULT  Goal: Patient will remain free of falls  Description: INTERVENTIONS:  - Educate patient/family on patient safety including physical limitations  - Instruct patient to call for assistance with activity   - Consult OT/PT to assist with strengthening/mobility   - Keep Call bell within reach  - Keep bed low and locked with side rails adjusted as appropriate  - Keep care items and personal belongings within reach  - Initiate and maintain comfort rounds  - Make Fall Risk Sign visible to staff  - Offer Toileting every 2 Hours, in advance of need  - Initiate/Maintain bed alarm  - Obtain necessary fall risk management equipment: bed check  - Apply yellow  socks and bracelet for high fall risk patients  - Consider moving patient to room near nurses station  Outcome: Progressing  Goal: Maintain or return to baseline ADL function  Description: INTERVENTIONS:  -  Assess patient's ability to carry out ADLs; assess patient's baseline for ADL function and identify physical deficits which impact ability to perform ADLs (bathing, care of mouth/teeth, toileting, grooming, dressing, etc.)  - Assess/evaluate cause of self-care deficits   - Assess range of motion  - Assess patient's mobility; develop plan if impaired  - Assess patient's need for assistive devices and provide as appropriate  - Encourage maximum independence but intervene and supervise when necessary  - Involve family in performance of ADLs  - Assess for home care needs following discharge   - Consider OT consult to assist with ADL evaluation and planning for discharge  - Provide patient education as appropriate  Outcome: Progressing  Goal: Maintains/Returns to pre admission functional level  Description: INTERVENTIONS:  - Perform AM-PAC 6 Click Basic Mobility/ Daily Activity assessment daily.  - Set and communicate daily mobility goal to care team and patient/family/caregiver.   - Collaborate with rehabilitation services on mobility goals if consulted    - Reposition patient every 2 hours.    - Out of bed for toileting  - Record patient progress and toleration of activity level   Outcome: Progressing     Problem: DISCHARGE PLANNING  Goal: Discharge to home or other facility with appropriate resources  Description: INTERVENTIONS:  - Identify barriers to discharge w/patient and caregiver  - Arrange for needed discharge resources and transportation as appropriate  - Identify discharge learning needs (meds, wound care, etc.)  - Arrange for interpretive services to assist at discharge as needed  - Refer to Case Management Department for coordinating discharge planning if the patient needs post-hospital services based  on physician/advanced practitioner order or complex needs related to functional status, cognitive ability, or social support system  Outcome: Progressing     Problem: Knowledge Deficit  Goal: Patient/family/caregiver demonstrates understanding of disease process, treatment plan, medications, and discharge instructions  Description: Complete learning assessment and assess knowledge base.  Interventions:  - Provide teaching at level of understanding  - Provide teaching via preferred learning methods  Outcome: Progressing     Problem: CARDIOVASCULAR - ADULT  Goal: Maintains optimal cardiac output and hemodynamic stability  Description: INTERVENTIONS:  - Monitor I/O, vital signs and rhythm  - Monitor for S/S and trends of decreased cardiac output  - Administer and titrate ordered vasoactive medications to optimize hemodynamic stability  - Assess quality of pulses, skin color and temperature  - Assess for signs of decreased coronary artery perfusion  - Instruct patient to report change in severity of symptoms  Outcome: Progressing  Goal: Absence of cardiac dysrhythmias or at baseline rhythm  Description: INTERVENTIONS:  - Continuous cardiac monitoring, vital signs, obtain 12 lead EKG if ordered  - Administer antiarrhythmic and heart rate control medications as ordered  - Monitor electrolytes and administer replacement therapy as ordered  Outcome: Progressing     Problem: MUSCULOSKELETAL - ADULT  Goal: Maintain or return mobility to safest level of function  Description: INTERVENTIONS:  - Assess patient's ability to carry out ADLs; assess patient's baseline for ADL function and identify physical deficits which impact ability to perform ADLs (bathing, care of mouth/teeth, toileting, grooming, dressing, etc.)  - Assess/evaluate cause of self-care deficits   - Assess range of motion  - Assess patient's mobility  - Assess patient's need for assistive devices and provide as appropriate  - Encourage maximum independence but  intervene and supervise when necessary  - Involve family in performance of ADLs  - Assess for home care needs following discharge   - Consider OT consult to assist with ADL evaluation and planning for discharge  - Provide patient education as appropriate  Outcome: Progressing  Goal: Maintain proper alignment of affected body part  Description: INTERVENTIONS:  - Support, maintain and protect limb and body alignment  - Provide patient/ family with appropriate education  Outcome: Progressing

## 2024-04-23 NOTE — ANESTHESIA POSTPROCEDURE EVALUATION
Post-Op Assessment Note    CV Status:  Stable    Pain management: satisfactory to patient       Mental Status:  Alert and awake   Hydration Status:  Stable   PONV Controlled:  None   Airway Patency:  Patent  Airway: intubated     Post Op Vitals Reviewed: Yes    No anethesia notable event occurred.    Staff: JESÚS               /74 (04/23/24 1515)    Temp 98.1 °F (36.7 °C) (04/23/24 1515)    Pulse 74 (04/23/24 1515)   Resp 16 (04/23/24 1515)    SpO2 97 % (04/23/24 1515)

## 2024-04-23 NOTE — PLAN OF CARE
Problem: PAIN - ADULT  Goal: Verbalizes/displays adequate comfort level or baseline comfort level  Description: Interventions:  - Encourage patient to monitor pain and request assistance  - Assess pain using appropriate pain scale  - Administer analgesics based on type and severity of pain and evaluate response  - Implement non-pharmacological measures as appropriate and evaluate response  - Consider cultural and social influences on pain and pain management  - Notify physician/advanced practitioner if interventions unsuccessful or patient reports new pain  Outcome: Progressing     Problem: INFECTION - ADULT  Goal: Absence or prevention of progression during hospitalization  Description: INTERVENTIONS:  - Assess and monitor for signs and symptoms of infection  - Monitor lab/diagnostic results  - Monitor all insertion sites, i.e. indwelling lines, tubes, and drains  - Monitor endotracheal if appropriate and nasal secretions for changes in amount and color  - [place] appropriate cooling/warming therapies per order  - Administer medications as ordered  - Instruct and encourage patient and family to use good hand hygiene technique  - Identify and instruct in appropriate isolation precautions for identified infection/condition  Outcome: Progressing     Problem: SAFETY ADULT  Goal: Patient will remain free of falls  Description: INTERVENTIONS:  - Educate patient/family on patient safety including physical limitations  - Instruct patient to call for assistance with activity   - Consult OT/PT to assist with strengthening/mobility   - Keep Call bell within reach  - Keep bed low and locked with side rails adjusted as appropriate  - Keep care items and personal belongings within reach  - Initiate and maintain comfort rounds  - Make Fall Risk Sign visible to staff  - Offer Toileting every 2 Hours, in advance of need  - Initiate/Maintain bed alarm  - Obtain necessary fall risk management equipment  - Apply yellow socks and  bracelet for high fall risk patients  - Consider moving patient to room near nurses station  Outcome: Progressing  Goal: Maintain or return to baseline ADL function  Description: INTERVENTIONS:  -  Assess patient's ability to carry out ADLs; assess patient's baseline for ADL function and identify physical deficits which impact ability to perform ADLs (bathing, care of mouth/teeth, toileting, grooming, dressing, etc.)  - Assess/evaluate cause of self-care deficits   - Assess range of motion  - Assess patient's mobility; develop plan if impaired  - Assess patient's need for assistive devices and provide as appropriate  - Encourage maximum independence but intervene and supervise when necessary  - Involve family in performance of ADLs  - Assess for home care needs following discharge   - Consider OT consult to assist with ADL evaluation and planning for discharge  - Provide patient education as appropriate  Outcome: Progressing  Goal: Maintains/Returns to pre admission functional level  Description: INTERVENTIONS:  - Perform AM-PAC 6 Click Basic Mobility/ Daily Activity assessment daily.  - Set and communicate daily mobility goal to care team and patient/family/caregiver.   - Collaborate with rehabilitation services on mobility goals if consulted  - Record patient progress and toleration of activity level   Outcome: Progressing     Problem: DISCHARGE PLANNING  Goal: Discharge to home or other facility with appropriate resources  Description: INTERVENTIONS:  - Identify barriers to discharge w/patient and caregiver  - Arrange for needed discharge resources and transportation as appropriate  - Identify discharge learning needs (meds, wound care, etc.)  - Arrange for interpretive services to assist at discharge as needed  - Refer to Case Management Department for coordinating discharge planning if the patient needs post-hospital services based on physician/advanced practitioner order or complex needs related to functional  status, cognitive ability, or social support system  Outcome: Progressing     Problem: Knowledge Deficit  Goal: Patient/family/caregiver demonstrates understanding of disease process, treatment plan, medications, and discharge instructions  Description: Complete learning assessment and assess knowledge base.  Interventions:  - Provide teaching at level of understanding  - Provide teaching via preferred learning methods  Outcome: Progressing     Problem: CARDIOVASCULAR - ADULT  Goal: Maintains optimal cardiac output and hemodynamic stability  Description: INTERVENTIONS:  - Monitor I/O, vital signs and rhythm  - Monitor for S/S and trends of decreased cardiac output  - Administer and titrate ordered vasoactive medications to optimize hemodynamic stability  - Assess quality of pulses, skin color and temperature  - Assess for signs of decreased coronary artery perfusion  - Instruct patient to report change in severity of symptoms  Outcome: Progressing  Goal: Absence of cardiac dysrhythmias or at baseline rhythm  Description: INTERVENTIONS:  - Continuous cardiac monitoring, vital signs, obtain 12 lead EKG if ordered  - Administer antiarrhythmic and heart rate control medications as ordered  - Monitor electrolytes and administer replacement therapy as ordered  Outcome: Progressing     Problem: MUSCULOSKELETAL - ADULT  Goal: Maintain or return mobility to safest level of function  Description: INTERVENTIONS:  - Assess patient's ability to carry out ADLs; assess patient's baseline for ADL function and identify physical deficits which impact ability to perform ADLs (bathing, care of mouth/teeth, toileting, grooming, dressing, etc.)  - Assess/evaluate cause of self-care deficits   - Assess range of motion  - Assess patient's mobility  - Assess patient's need for assistive devices and provide as appropriate  - Encourage maximum independence but intervene and supervise when necessary  - Involve family in performance of ADLs  -  Assess for home care needs following discharge   - Consider OT consult to assist with ADL evaluation and planning for discharge  - Provide patient education as appropriate  Outcome: Progressing  Goal: Maintain proper alignment of affected body part  Description: INTERVENTIONS:  - Support, maintain and protect limb and body alignment  - Provide patient/ family with appropriate education  Outcome: Progressing

## 2024-04-23 NOTE — UTILIZATION REVIEW
Continued Stay Review    SEE INITIAL REVIEW AT BOTTOM    Date: 4/22/24     Day 3: Has surpassed a 2nd midnight with active treatments and services.  Patient states the pain in her left foot is controllable noted to have a some swelling and blistering today and plan for surgery canceled. Will have Ortho reassess again tomorrow family and patient understand course of care. Consult Ortho.  Plan for surgical intervention Tuesday if swelling decreases.     Date: 4/23/24   OP REPORT  SURGERY DATE: 4/23/2024   Procedure(s):  Left - OPEN REDUCTION W/ INTERNAL FIXATION (ORIF) ANKLE  Anesthesia Type:   General with adductor canal and popliteal nerve blocks and supplemental local  Operative Findings: At the time of the procedure, the patient was noted to have some nonhemorrhagic bullae located both medially and laterally.  The medial bullae was posteriorly and the lateral bullae noted posteriorly and a secondary smaller 1 anteriorly allowing for good surgical exposure with safe skin margins.  The fracture was stably fixated in standard fashion utilizing a distal fibular plate for the lateral fracture with a screw inserted from anterior to posterior to secure the anterior tibiofibular ligament attachment which had avulsed and 2 cannulated screws medially.  At the conclusion of the procedure, AP, lateral and mortise views demonstrated the fracture to be in good alignment with stable fixation.      Vital Signs:   Date/Time Temp Pulse Resp BP MAP (mmHg) SpO2 O2 Flow Rate (L/min) O2 Device Patient Position - Orthostatic VS   04/23/24 1515 98.1 °F (36.7 °C) 74 16 152/74 105 97 % 5 L/min Simple mask --   04/23/24 1127 97.4 °F (36.3 °C) Abnormal  76 18 183/88 Abnormal  -- 97 % -- None (Room air) --   04/23/24 0745 -- -- -- -- -- 95 % -- None (Room air) --   04/23/24 07:31:35 97.7 °F (36.5 °C) 70 18 157/85 109 95 % -- -- --   04/22/24 21:34:12 -- 67 18 149/80 103 96 % -- -- --   04/22/24 2024 -- -- -- -- -- 94 % -- None (Room air) --    04/22/24 1736 -- -- -- -- -- -- -- None (Room air) --   04/22/24 1600 -- -- -- -- -- -- -- None (Room air) --   04/22/24 15:09:19 97.3 °F (36.3 °C) Abnormal  64 18 146/84 105 96 % -- None (Room air) Sitting   04/22/24 07:40:21 97.7 °F (36.5 °C) 70 18 148/87 107 97 % -- None (Room air) Lying   04/22/24 0730 97.8 °F (36.6 °C) -- -- -- -- 96 % -- None (Room air) --   04/21/24 22:18:58 97.2 °F (36.2 °C) Abnormal  68 20 150/88 109 96 % -- -- --   04/21/24 2000 -- -- -- -- -- 95 % -- None (Room air) --       Pertinent Labs/Diagnostic Results:     Results from last 7 days   Lab Units 04/21/24  0510 04/20/24  1530   WBC Thousand/uL 8.51 8.11   HEMOGLOBIN g/dL 12.9 13.3   HEMATOCRIT % 38.8 40.6   PLATELETS Thousands/uL 164 185   TOTAL NEUT ABS Thousands/µL  --  5.19       Results from last 7 days   Lab Units 04/21/24  0510 04/20/24  1530   SODIUM mmol/L 140 140   POTASSIUM mmol/L 3.8 3.9   CHLORIDE mmol/L 109* 108   CO2 mmol/L 25 25   ANION GAP mmol/L 6 7   BUN mg/dL 24 29*   CREATININE mg/dL 0.94 1.20   EGFR ml/min/1.73sq m 59 44   CALCIUM mg/dL 9.5 9.3     Results from last 7 days   Lab Units 04/21/24  0510   AST U/L 19   ALT U/L 13   ALK PHOS U/L 41   TOTAL PROTEIN g/dL 6.3*   ALBUMIN g/dL 3.7   TOTAL BILIRUBIN mg/dL 0.60       Results from last 7 days   Lab Units 04/21/24  0510 04/20/24  1530   GLUCOSE RANDOM mg/dL 101 114       Results from last 7 days   Lab Units 04/21/24  0510 04/20/24  1530   PROTIME seconds 13.3 13.2   INR  0.98 0.97     Results from last 7 days   Lab Units 04/21/24  0510   TSH 3RD GENERATON uIU/mL 0.930       Medications:   Scheduled Medications:  atenolol, 25 mg, Oral, QAM  bupivacaine liposomal, 20 mL, Infiltration, Once  ezetimibe, 10 mg, Oral, Daily  heparin (porcine), 5,000 Units, Subcutaneous, Q8H ANGELI  pravastatin, 20 mg, Oral, Daily With Dinner  scopolamine, 1 patch, Transdermal, Q72H      Continuous IV Infusions:  lactated ringers, 100 mL/hr, Intravenous, Continuous      PRN  Meds:  acetaminophen, 650 mg, Oral, Q6H PRN  (4/22 recd x2)   fentaNYL, 25 mcg, Intravenous, Q3 min PRN  HYDROmorphone, 0.2 mg, Intravenous, Q10 Min PRN  morphine injection, 2 mg, Intravenous, Q6H PRN  ondansetron, 4 mg, Intravenous, Q6H PRN  ondansetron, 4 mg, Intravenous, Once PRN  xyCODONE, 5 mg, Oral, Q6H PRN        Discharge Plan: TBD    Network Utilization Review Department  ATTENTION: Please call with any questions or concerns to [phone] and carefully listen to the prompts so that you are directed to the right person. All voicemails are confidential.   For Discharge needs, contact Care Management DC Support Team at [phone] opt. [phone]  Send all requests for admission clinical reviews, approved or denied determinations and any other requests to dedicated fax number below belonging to the campus where the patient is receiving treatment. List of dedicated fax numbers for the Facilities:  FACILITY NAME UR FAX NUMBER   ADMISSION DENIALS (Administrative/Medical Necessity) [phone]   DISCHARGE SUPPORT TEAM (NETWORK) [phone]   PARENT CHILD HEALTH (Maternity/NICU/Pediatrics) [phone]   [facility] [phone]   [facility] [phone]   [facility] [phone]   [facility] [phone]   [facility] [phone]   [facility] [phone]   [facility] [phone]   [facility] [phone]   [facility] [phone]   [facility] [phone]   [facility] [phone]   [facility] [phone]

## 2024-04-23 NOTE — INTERVAL H&P NOTE
H&P reviewed. After examining the patient I find no changes in the patients condition since the H&P had been written.    Vitals:    04/23/24 1127   BP: (!) 183/88   Pulse: 76   Resp: 18   Temp: (!) 97.4 °F (36.3 °C)   SpO2: 97%   The patient was seen by myself today and the risks, benefits, options and alternatives of treatment discussed. The patient is agreeable to proceed and all questions were answered.

## 2024-04-23 NOTE — ANESTHESIA PROCEDURE NOTES
Peripheral Block    Patient location during procedure: holding area  Start time: 4/23/2024 12:37 PM  Reason for block: at surgeon's request and post-op pain management  Staffing  Performed by: Raphael Ryan MD  Authorized by: Raphael Ryan MD    Preanesthetic Checklist  Completed: patient identified, IV checked, site marked, risks and benefits discussed, surgical consent, monitors and equipment checked, pre-op evaluation and timeout performed  Peripheral Block  Patient position: supine  Prep: ChloraPrep  Patient monitoring: continuous pulse oximetry, frequent blood pressure checks and heart rate  Block type: Adductor Canal  Laterality: left  Injection technique: single-shot  Procedures: ultrasound guided, Ultrasound guidance required for the procedure to increase accuracy and safety of medication placement and decrease risk of complications.bupivacaine (PF) (MARCAINE) 0.5 % injection 20 mL - Perineural   20 mL - 4/23/2024 12:38:00 PM  bupivacaine liposomal (EXPAREL) 1.3 % injection 20 mL - Perineural   10 mL - 4/23/2024 12:38:00 PM  Needle  Needle type: Stimuplex   Needle gauge: 20 G  Needle length: 4 in  Needle localization: ultrasound guidance  Assessment  Injection assessment: frequent aspiration, injected with ease, negative aspiration, no paresthesia on injection, incremental injection, needle tip visualized at all times, negative for heart rate change and no symptoms of intraneural/intravenous injection  Paresthesia pain: immediately resolved  Post-procedure:  site cleaned

## 2024-04-23 NOTE — PROGRESS NOTES
"Progress Note - Orthopedics   Karly Helton 73 y.o. female MRN: [id number]  Unit/Bed#: -01      Subjective:    73 y.o.female with left ankle pain and trimalleolar fracture currently in a posterior splint and surgical repair.  Patient is relatively comfortable in the splint.  She denies numbness or tingling.  Denies calf pain chest pain or shortness of breath.  She is hopeful of proceeding with surgery today.      Labs:  0   Lab Value Date/Time    HCT 38.8 04/21/2024 0510    HCT 40.6 04/20/2024 1530    HGB 12.9 04/21/2024 0510    HGB 13.3 04/20/2024 1530    INR 0.98 04/21/2024 0510    WBC 8.51 04/21/2024 0510    WBC 8.11 04/20/2024 1530    CRP <0.40 01/24/2024 0626   PT 13.3, INR 0.98    Meds:    Current Facility-Administered Medications:     acetaminophen (TYLENOL) tablet 650 mg, 650 mg, Oral, Q6H PRN, Zaida Hayes MD, 650 mg at 04/22/24 2024    atenolol (TENORMIN) tablet 25 mg, 25 mg, Oral, QAM, Zaida Hayes MD, 25 mg at 04/22/24 0826    ezetimibe (ZETIA) tablet 10 mg, 10 mg, Oral, Daily, Zaida Hayes MD, 10 mg at 04/22/24 0826    heparin (porcine) subcutaneous injection 5,000 Units, 5,000 Units, Subcutaneous, Q8H ANGELI, Zaida Hayes MD, 5,000 Units at 04/22/24 2101    morphine injection 2 mg, 2 mg, Intravenous, Q6H PRN, Zaida Hayes MD    ondansetron (ZOFRAN) injection 4 mg, 4 mg, Intravenous, Q6H PRN, Zaida Hayes MD, 4 mg at 04/20/24 1736    oxyCODONE (ROXICODONE) IR tablet 5 mg, 5 mg, Oral, Q6H PRN, Zaida Hayes MD    pravastatin (PRAVACHOL) tablet 20 mg, 20 mg, Oral, Daily With Dinner, Zaida Hayes MD, 20 mg at 04/22/24 1626    Blood Culture:   No results found for: \"BLOODCX\"    Wound Culture:   No results found for: \"WOUNDCULT\"    Ins and Outs:  I/O last 24 hours:  In: 720 [P.O.:720]  Out: 1350 [Urine:1350]    Physical:  Vitals:    04/22/24 2134   BP: 149/80   Pulse: 67   Resp: 18   Temp:    SpO2: 96%     Musculoskeletal: left Lower Extremity  Posterior splint was taken down to evaluate the skin about " the medial, lateral, anterior aspect of the ankle.  Skin without open wounds.  There are fracture blisters along the medial distal tibial area small blisters on the lateral distal fibular area near pattern distal to proximal.  Gross range of motion of the toes is intact.  Gross sensation of the toes is intact as well as minimal gross motion of the toes.  No erythema.  Slight ecchymosis in the lateral  and medial aspect of the ankle.    TTP about the medial and lateral ankle area consistent with fractures.  2+ DP pulse  Digits warm and well perfused    Assessment:    73 y.o.female with left ankle trimalleolar fracture and small fracture blisters but will proceed with operative ORIF today.     Plan:  NWB left lower extremity  Continue strict ice and elevation of the left ankle  N.p.o. for OR today.  Pain control and DVT prophylaxis per primary service.  Patient should not get any further heparin today.  Dispo: Ortho will follow    Fadi Rios PA-C

## 2024-04-23 NOTE — OP NOTE
OPERATIVE REPORT  PATIENT NAME: Karly Helton    :  1950  MRN: [id number]  Pt Location: OW OR ROOM 02    SURGERY DATE: 2024    Surgeons and Role:     * Foreign De Paz - Primary     * Fadi Rios PA-C - Assisting    Preop Diagnosis:  Closed trimalleolar fracture of left ankle, initial encounter [S82.852A]    Post-Op Diagnosis Codes:     * Closed trimalleolar fracture of left ankle, initial encounter [S82.852A]    Procedure(s):  Left - OPEN REDUCTION W/ INTERNAL FIXATION (ORIF) ANKLE    Tourniquet time: 66 minutes at 300 mmHg    Fluids: 1100 cc    Estimated Blood Loss:   Minimal    Drains:  External Urinary Catheter (Active)   Collection Container Canister and suction tubing (For Female) 24 0926   Suction Pressure (mmHg) 100 mmHg 24 0926   Interventions Removed and skin assessed;Pericare performed 24 0926   Output (mL) 1000 mL 24 0500   Number of days: 2       Anesthesia Type:   General with adductor canal and popliteal nerve blocks and supplemental local    Operative Indications:  Closed trimalleolar fracture of left ankle, initial encounter   Sharda is a 73-year-old female who missed a step leaving her home falling down the outside steps injuring her left ankle.  She was seen in the emergency room at [facility] where she was evaluated, x-rays were obtained and she was admitted to the hospital with a displaced left ankle fracture.  She was taking Xarelto and therefore surgery was postponed.  When seen by myself on 2024 the risk, benefits, options and alternatives of treatment were discussed and it was elected to proceed with surgical fixation on 2024.    Operative Findings:  At the time of the procedure, the patient was noted to have some nonhemorrhagic bullae located both medially and laterally.  The medial bullae was posteriorly and the lateral bullae noted posteriorly and a secondary smaller 1 anteriorly allowing for good surgical exposure with  safe skin margins.  The fracture was stably fixated in standard fashion utilizing a distal fibular plate for the lateral fracture with a screw inserted from anterior to posterior to secure the anterior tibiofibular ligament attachment which had avulsed and 2 cannulated screws medially.  At the conclusion of the procedure, AP, lateral and mortise views demonstrated the fracture to be in good alignment with stable fixation.    Complications:   None    Procedure and Technique:  Patient was taken to the operating room and administered a general anesthetic after administration of peripheral nerve blocks in the holding area. A well-padded tourniquet was then applied to the proximal left lower extremity. The limb was then prepped and draped in standard fashion. A time-out was performed and preoperative antibiotics administered.  Limb was elevated, exsanguinated with an Esmarch bandage and the tourniquet then inflated to 300 mmHg. A lateral incision was then made guided by fluoroscopy over the distal fibula. Sharp dissection was carried down through the skin and subcutaneous tissues. The sural nerve was protected and the distal fibula then exposed subperiosteally.  The fracture anatomy was identified.  The fracture was mobilized and cleansed of debris with an irrigation and curette technique followed by reduction of the primary proximal fragment and distal fragments.  At this time, the anterior inferior tibiofibular ligament attachment from the distal fibula was noted to be fractured from the primary distal fragment.  The fracture was reduced and 2 K wires were utilized to stabilize the fracture.  A 4 hole distal fibular locking plate was then positioned over the distal fibula and fluoroscopic imaging obtained to ensure good position.  This was then secured to the fibular shaft and then distally to the malleolus.  An additional screw was placed in the malleolus.  The K wire securing the distal fibular anterior fragment was  removed and a cannulated screw utilized to secure this fragment to the primary malleolar fragment.  The K wire was then removed from the primary fracture site and additional screws placed in the plate to allow for adequate fixation.  A cotton test was performed demonstrating no evidence of syndesmotic widening.    A medial incision was then made over the distal tibia curved anteriorly at the distal extent. Sharp dissection carried down through the skin. Soft tissues were then bluntly dissected to expose the fracture site. The distal tibia at the medial malleolar level was exposed subperiosteally. The fracture site was irrigated, cleansed of debris and mobilized. The articular surface was inspected with there was noted to be a loose fragment of articular cartilage from the proximal talus although this measured no more than about 3 to 4 mm.  This was removed. The fracture was then reduced and secured with guidewires for the cannulated screw system. Fluoroscopic images obtained demonstrating good alignment of the fracture and 50 mm cannulated screws were inserted in standard fashion. Fluoroscopic images were obtained documenting good alignment of the fracture with proper screw placement.  Final fluoroscopic images in the AP, lateral and mortise views were obtained demonstrating the fracture to be in good alignment with stable fixation. Wounds were irrigated with saline solution and then closed 2-0 Vicryl and staples for the medial incision.  The lateral incision was closed with 0 Vicryl, 2-0 Vicryl and staples. Dressings were applied consisting of Xeroform gauze covering the incisions and fracture blisters, gauze, Webril and the tourniquet then deflated after total tourniquet time of 66 minutes. A U splint was then applied secured with Ace bandages and the patient transferred to the recovery room in stable and satisfactory postoperative condition.   I was present for the entire procedure., A qualified resident  physician was not available., and A physician assistant was required during the procedure for retraction, tissue handling, dissection and suturing.    Patient Disposition:  APU        SIGNATURE: Foreign De Paz  DATE: April 23, 2024  TIME: 3:27 PM

## 2024-04-23 NOTE — ANESTHESIA PROCEDURE NOTES
Peripheral Block    Patient location during procedure: holding area  Start time: 4/23/2024 12:15 PM  Reason for block: at surgeon's request and post-op pain management  Staffing  Performed by: Rapahel Ryan MD  Authorized by: Raphael Ryan MD    Preanesthetic Checklist  Completed: patient identified, IV checked, site marked, risks and benefits discussed, surgical consent, monitors and equipment checked, pre-op evaluation and timeout performed  Peripheral Block  Patient position: right lateral  Prep: ChloraPrep  Block type: Popliteal  Laterality: left  Injection technique: single-shot  Procedures: ultrasound guided, Ultrasound guidance required for the procedure to increase accuracy and safety of medication placement and decrease risk of complications.bupivacaine (PF) (MARCAINE) 0.5 % injection 20 mL - Perineural   20 mL - 4/23/2024 12:10:00 PM  bupivacaine liposomal (EXPAREL) 1.3 % injection 20 mL - Perineural   10 mL - 4/23/2024 12:10:00 PM  Needle  Needle type: Stimuplex   Needle gauge: 20 G  Needle length: 4 in  Needle localization: ultrasound guidance  Assessment  Injection assessment: frequent aspiration, injected with ease, negative aspiration, no paresthesia on injection, incremental injection, needle tip visualized at all times, negative for heart rate change and no symptoms of intraneural/intravenous injection  Paresthesia pain: immediately resolved  Post-procedure:  site cleaned  patient tolerated the procedure well with no immediate complications

## 2024-04-24 LAB
ANION GAP SERPL CALCULATED.3IONS-SCNC: 8 MMOL/L (ref 4–13)
BUN SERPL-MCNC: 23 MG/DL (ref 5–25)
CALCIUM SERPL-MCNC: 9.4 MG/DL (ref 8.4–10.2)
CHLORIDE SERPL-SCNC: 107 MMOL/L (ref 96–108)
CO2 SERPL-SCNC: 24 MMOL/L (ref 21–32)
CREAT SERPL-MCNC: 0.93 MG/DL (ref 0.6–1.3)
ERYTHROCYTE [DISTWIDTH] IN BLOOD BY AUTOMATED COUNT: 11.9 % (ref 11.6–15.1)
GFR SERPL CREATININE-BSD FRML MDRD: 60 ML/MIN/1.73SQ M
GLUCOSE SERPL-MCNC: 114 MG/DL (ref 65–140)
HCT VFR BLD AUTO: 38.4 % (ref 34.8–46.1)
HGB BLD-MCNC: 13.1 G/DL (ref 11.5–15.4)
MCH RBC QN AUTO: 31.5 PG (ref 26.8–34.3)
MCHC RBC AUTO-ENTMCNC: 34.1 G/DL (ref 31.4–37.4)
MCV RBC AUTO: 92 FL (ref 82–98)
PLATELET # BLD AUTO: 169 THOUSANDS/UL (ref 149–390)
PMV BLD AUTO: 10.4 FL (ref 8.9–12.7)
POTASSIUM SERPL-SCNC: 4 MMOL/L (ref 3.5–5.3)
RBC # BLD AUTO: 4.16 MILLION/UL (ref 3.81–5.12)
SODIUM SERPL-SCNC: 139 MMOL/L (ref 135–147)
WBC # BLD AUTO: 10.54 THOUSAND/UL (ref 4.31–10.16)

## 2024-04-24 PROCEDURE — 97116 GAIT TRAINING THERAPY: CPT

## 2024-04-24 PROCEDURE — 97530 THERAPEUTIC ACTIVITIES: CPT

## 2024-04-24 PROCEDURE — 99232 SBSQ HOSP IP/OBS MODERATE 35: CPT | Performed by: HOSPITALIST

## 2024-04-24 PROCEDURE — 85027 COMPLETE CBC AUTOMATED: CPT | Performed by: ORTHOPAEDIC SURGERY

## 2024-04-24 PROCEDURE — 99024 POSTOP FOLLOW-UP VISIT: CPT | Performed by: ORTHOPAEDIC SURGERY

## 2024-04-24 PROCEDURE — 97535 SELF CARE MNGMENT TRAINING: CPT

## 2024-04-24 PROCEDURE — 80048 BASIC METABOLIC PNL TOTAL CA: CPT | Performed by: ORTHOPAEDIC SURGERY

## 2024-04-24 RX ADMIN — PRAVASTATIN SODIUM 20 MG: 20 TABLET ORAL at 17:02

## 2024-04-24 RX ADMIN — ACETAMINOPHEN 325MG 975 MG: 325 TABLET ORAL at 23:12

## 2024-04-24 RX ADMIN — ATENOLOL 25 MG: 25 TABLET ORAL at 08:14

## 2024-04-24 RX ADMIN — CEFAZOLIN SODIUM 2000 MG: 2 SOLUTION INTRAVENOUS at 05:03

## 2024-04-24 RX ADMIN — EZETIMIBE 10 MG: 10 TABLET ORAL at 08:14

## 2024-04-24 RX ADMIN — ACETAMINOPHEN 325MG 975 MG: 325 TABLET ORAL at 08:14

## 2024-04-24 RX ADMIN — RIVAROXABAN 10 MG: 10 TABLET, FILM COATED ORAL at 08:13

## 2024-04-24 RX ADMIN — ACETAMINOPHEN 325MG 975 MG: 325 TABLET ORAL at 16:59

## 2024-04-24 RX ADMIN — CEFAZOLIN SODIUM 2000 MG: 2 SOLUTION INTRAVENOUS at 12:43

## 2024-04-24 NOTE — PLAN OF CARE
Problem: OCCUPATIONAL THERAPY ADULT  Goal: Performs self-care activities at highest level of function for planned discharge setting.  See evaluation for individualized goals.  Description: Treatment Interventions: ADL retraining, Functional transfer training, UE strengthening/ROM, Endurance training, Patient/family training, Equipment evaluation/education, Compensatory technique education, Continued evaluation, Energy conservation, Activityengagement          See flowsheet documentation for full assessment, interventions and recommendations.   Outcome: Progressing  Note: Limitation: Decreased ADL status, Decreased endurance, Decreased self-care trans, Decreased high-level ADLs  Prognosis: Good  Assessment: Pt completed OT tx session #1 focused on ADLs and functional mobility. Pt alert and agreeable to participate. PT/OT co-treat completed d/t significant mobility deficits and safety concerns. Pt demonstrated improvements in assistance required for functional transfers this session. She is anxious at times, but receptive to encouragement and reassurance. Interventions today consisted of functional transfer training, LB ADL training, and pt/family education. Pt currently completing UB ADLs @ supervision/setup, LB ADLs @ minimal assistance, and functional mobility with RW and knee platform attachment @ steadying assistance. Pt demonstrating G participation and G potential to achieve goals but is currently demonstrating deficits in decrease activity tolerance, decrease standing balance, decrease performance during ADL tasks, decrease performance during functional transfers, steps to enter home, limited family support, and high fall risk. Continue to recommend Level I (Maximum Resource Intensity) upon discharge to increase safety and regain her independence in ADL tasks and functional mobility. At end of session, pt sitting upright in bed with bed alarm on, call bell in reach, and all needs met.     Rehab Resource  Intensity Level, OT: I (Maximum Resource Intensity)

## 2024-04-24 NOTE — PROGRESS NOTES
Progress Note - Orthopedics   Karly Helton 74 y.o. female MRN: [id number]  Unit/Bed#: -01 Encounter: [id number]    Assessment:  POD #1 ORIF left ankle; ambulatory dysfunction; hypertension    Plan:  PT/OT to see the patient today to begin ambulation training nonweightbearing left lower extremity.  Dressings to remain in place.  Discharge planning can proceed.  When medically stable, the patient can be discharged.  Patient aware that she may need rehab/ECF placement prior to return home.    Weight bearing: Nonweightbearing left lower extremity    VTE Pharmacologic Prophylaxis: Xarelto  VTE Mechanical Prophylaxis: sequential compression device    Subjective: Karly reports that she was unable to move her toes a few hours ago but is now able to actively move her toes.  She denies any significant pain at this time.  She denies paresthesias.  She denies lightheadedness, dizziness, chest pain or shortness of breath.    Vitals: Blood pressure 155/80, pulse 63, temperature 97.5 °F (36.4 °C), resp. rate 18, weight 110 kg (241 lb 10 oz), SpO2 96%.,Body mass index is 39.6 kg/m².      Intake/Output Summary (Last 24 hours) at 4/24/2024 0933  Last data filed at 4/24/2024 0812  Gross per 24 hour   Intake 1630 ml   Output 1650 ml   Net -20 ml       Invasive Devices       Peripheral Intravenous Line  Duration             Peripheral IV 04/23/24 Distal;Dorsal (posterior);Right Forearm 1 day              Drain  Duration             External Urinary Catheter 2 days                    Physical Exam: The left lower extremity dressings and splint are clean, dry and intact.  Calf compartments proximal to the splint are soft, compressible and nontender.  Toes are noted to have good color and capillary refill.  She is actively able to move her toes without difficulty.  Sensation is intact.    Lab, Imaging and other studies: CBC:   Lab Results   Component Value Date    WBC 10.54 (H) 04/24/2024    HGB 13.1 04/24/2024    HCT 38.4  04/24/2024    MCV 92 04/24/2024     04/24/2024    RBC 4.16 04/24/2024    MCH 31.5 04/24/2024    MCHC 34.1 04/24/2024    RDW 11.9 04/24/2024    MPV 10.4 04/24/2024

## 2024-04-24 NOTE — ASSESSMENT & PLAN NOTE
She missed a step while going down her front porch.she had mechanical fall and fell sideways and felt pain in her left ankle.  Unable to stand afterwards despite help. Xray shows trimalleolar fracture of the left ankle.    Now status post repair by orthopedic surgery  Doing well postoperative with reduced pain  PT/OT  DVT prophylaxis

## 2024-04-24 NOTE — PROGRESS NOTES
[facility]  Progress Note  Name: Karly Helton I  MRN: [id number]  Unit/Bed#: -01 I Date of Admission: 4/20/2024   Date of Service: 4/24/2024 I Hospital Day: 4    Assessment/Plan   * Closed fracture of left ankle with routine healing  Assessment & Plan  She missed a step while going down her front porch.she had mechanical fall and fell sideways and felt pain in her left ankle.  Unable to stand afterwards despite help. Xray shows trimalleolar fracture of the left ankle.    Now status post repair by orthopedic surgery  Doing well postoperative with reduced pain  PT/OT - needs rehab  DVT prophylaxis, on Xarelto    Mixed hyperlipidemia  Assessment & Plan  Continue statin therapy    Essential hypertension  Assessment & Plan  Continue atenolol as per home regimen continue pain control    Prothrombin gene mutation (HCC)  Assessment & Plan  History Of DVT in the past.  Known history of prothrombin gene mutation.    Currently on Xarelto 10 mg daily.  Held preoperatively, and now resumed               VTE Pharmacologic Prophylaxis:   High Risk (Score >/= 5) - Pharmacological DVT Prophylaxis Ordered: rivaroxaban (Xarelto). Sequential Compression Devices Ordered.    Mobility:   Basic Mobility Inpatient Raw Score: 16  JH-HLM Goal: 5: Stand one or more mins  JH-HLM Achieved: 5: Stand (1 or more minutes)  JH-HLM Goal achieved. Continue to encourage appropriate mobility.    Patient Centered Rounds: I performed bedside rounds with nursing staff today.   Discussions with Specialists or Other Care Team Provider: none    Education and Discussions with Family / Patient: Patient declined call to .     Total Time Spent on Date of Encounter in care of patient: 26 mins. This time was spent on one or more of the following: performing physical exam; counseling and coordination of care; obtaining or reviewing history; documenting in the medical record; reviewing/ordering tests, medications or  procedures; communicating with other healthcare professionals and discussing with patient's family/caregivers.    Current Length of Stay: 4 day(s)  Current Patient Status: Inpatient   Certification Statement: The patient will continue to require additional inpatient hospital stay due to ankle fracture, needs rehab  Discharge Plan: Anticipate discharge tomorrow to rehab facility.    Code Status: Level 1 - Full Code    Subjective:   Patient is feeling well, pain controlled, and no acute issues    Objective:     Vitals:   Temp (24hrs), Av.2 °F (36.2 °C), Min:97 °F (36.1 °C), Max:97.5 °F (36.4 °C)    Temp:  [97 °F (36.1 °C)-97.5 °F (36.4 °C)] 97.5 °F (36.4 °C)  HR:  [63-71] 68  Resp:  [16-18] 18  BP: (131-155)/(65-80) 131/65  SpO2:  [94 %-97 %] 94 %  Body mass index is 39.6 kg/m².     Input and Output Summary (last 24 hours):     Intake/Output Summary (Last 24 hours) at 2024 1611  Last data filed at 2024 1100  Gross per 24 hour   Intake 660 ml   Output 1950 ml   Net -1290 ml       Physical Exam:   Physical Exam  Vitals and nursing note reviewed.   Constitutional:       General: She is not in acute distress.     Appearance: She is well-developed.   HENT:      Head: Normocephalic and atraumatic.   Eyes:      Conjunctiva/sclera: Conjunctivae normal.   Cardiovascular:      Rate and Rhythm: Normal rate and regular rhythm.      Heart sounds: No murmur heard.  Pulmonary:      Effort: Pulmonary effort is normal. No respiratory distress.      Breath sounds: Normal breath sounds.   Abdominal:      Palpations: Abdomen is soft.      Tenderness: There is no abdominal tenderness.   Musculoskeletal:         General: No swelling.      Cervical back: Neck supple.      Comments: Left lower extremity wrapped   Skin:     General: Skin is warm and dry.      Capillary Refill: Capillary refill takes less than 2 seconds.   Neurological:      Mental Status: She is alert.   Psychiatric:         Mood and Affect: Mood normal.           Additional Data:     Labs:  Results from last 7 days   Lab Units 04/24/24  0512 04/21/24  0510 04/20/24  1530   WBC Thousand/uL 10.54*   < > 8.11   HEMOGLOBIN g/dL 13.1   < > 13.3   HEMATOCRIT % 38.4   < > 40.6   PLATELETS Thousands/uL 169   < > 185   SEGS PCT %  --   --  63   LYMPHO PCT %  --   --  24   MONO PCT %  --   --  10   EOS PCT %  --   --  2    < > = values in this interval not displayed.     Results from last 7 days   Lab Units 04/24/24  0512 04/21/24  0510   SODIUM mmol/L 139 140   POTASSIUM mmol/L 4.0 3.8   CHLORIDE mmol/L 107 109*   CO2 mmol/L 24 25   BUN mg/dL 23 24   CREATININE mg/dL 0.93 0.94   ANION GAP mmol/L 8 6   CALCIUM mg/dL 9.4 9.5   ALBUMIN g/dL  --  3.7   TOTAL BILIRUBIN mg/dL  --  0.60   ALK PHOS U/L  --  41   ALT U/L  --  13   AST U/L  --  19   GLUCOSE RANDOM mg/dL 114 101     Results from last 7 days   Lab Units 04/21/24  0510   INR  0.98                   Lines/Drains:  Invasive Devices       Peripheral Intravenous Line  Duration             Peripheral IV 04/23/24 Distal;Dorsal (posterior);Right Forearm 1 day              Drain  Duration             External Urinary Catheter 2 days                          Imaging: No pertinent imaging reviewed.    Recent Cultures (last 7 days):         Last 24 Hours Medication List:   Current Facility-Administered Medications   Medication Dose Route Frequency Provider Last Rate    acetaminophen  975 mg Oral Q8H [facility] Foreign Diverio      atenolol  25 mg Oral QAM Foreign Diverio      ezetimibe  10 mg Oral Daily Foreign Diverio      lactated ringers  1,000 mL Intravenous Once PRN Foreign Diverio      And    lactated ringers  1,000 mL Intravenous Once PRN Foreign Diverio      lactated ringers  100 mL/hr Intravenous Continuous Jenaro Cerna CRNA      morphine injection  2 mg Intravenous Q6H PRN Foreign Diverio      ondansetron  4 mg Intravenous Q6H PRN Foreign Diverio      oxyCODONE  10 mg Oral Q6H PRN Foreign Diverio      oxyCODONE  5 mg Oral Q6H PRN  Foreign Diverio      pravastatin  20 mg Oral Daily With Dinner Foreign Diverio      rivaroxaban  10 mg Oral Daily With Breakfast Foreign Diverio      sodium chloride  1,000 mL Intravenous Once PRN Foreign Diverio      And    sodium chloride  1,000 mL Intravenous Once PRN Foreign Diverio          Today, Patient Was Seen By: Jaciel Wing DO    **Please Note: This note may have been constructed using a voice recognition system.**

## 2024-04-24 NOTE — OCCUPATIONAL THERAPY NOTE
Occupational Therapy Progress Note     Patient Name: Karly Helton  Today's Date: 4/24/2024  Problem List  Principal Problem:    Closed fracture of left ankle with routine healing  Active Problems:    Prothrombin gene mutation (HCC)    Essential hypertension    Mixed hyperlipidemia       04/24/24 1118   Note Type   Note Type Treatment   Pain Assessment   Pain Assessment Tool 0-10   Pain Score No Pain   Restrictions/Precautions   Weight Bearing Precautions Per Order Yes   LLE Weight Bearing Per Order NWB   Other Precautions Chair Alarm;Bed Alarm;Fall Risk;Pain   ADL   LB Dressing Assistance 4  Minimal Assistance   LB Dressing Deficit Don/doff R sock;Thread RLE into underwear;Thread LLE into underwear   LB Dressing Comments Pt able to doff/don the R sock, and thread underwear through BLEs in seated. Pt requiring minimal assistance to pull up over hips in standing.   Bed Mobility   Supine to Sit 5  Supervision  (HOB flat, no bedrails)   Additional items Increased time required;Verbal cues   Sit to Supine 5  Supervision  (HOB elevated)   Additional items Increased time required;Verbal cues   Additional Comments Pt received supine in bed upon start of session. Pt supine > sit EOB with supervision. Pt wishing to return to supine after activity which she also completes at supervision and HOB elevated.   Transfers   Sit to Stand   (Minimal assistance--->steadying assistance)   Additional items Assist x 1;Increased time required;Verbal cues   Stand to Sit   (Steadying assist)   Additional items Assist x 1;Increased time required;Verbal cues   Stand pivot   (Steadying assist)   Additional items Assist x 1;Increased time required;Verbal cues   Additional Comments All functional transfers with RW and knee platform attachment.   Functional Mobility   Functional Mobility   (Steadying assist)   Additional Comments Pt participated in functional household distance x 2 trials with knee platform attachment, RW, and steadying  assistance.   Toilet Transfers   Toilet Transfer From Other (Comment)  (Chair)   Toilet Transfer Type To and from   Toilet Transfer to Extra wide bedside commode   Toilet Transfer Technique Squat pivot   Toilet Transfers Minimal assistance   Toilet Transfers Comments BSC brought close to recliner chair. Pt participated in squat pivot transfer to the R and to the L with min assist x 1. Assistance for maintaining NWB status of the LLE.   Cognition   Overall Cognitive Status WFL   Arousal/Participation Alert;Cooperative   Attention Within functional limits   Orientation Level Oriented X4   Memory Within functional limits   Following Commands Follows one step commands with increased time or repetition   Comments Pt anxious at times but receptive to education and appreciative of therapy   Activity Tolerance   Activity Tolerance Patient tolerated treatment well   Medical Staff Made Aware PT, Alexis   Assessment   Assessment Pt completed OT tx session #1 focused on ADLs and functional mobility. Pt alert and agreeable to participate. PT/OT co-treat completed d/t significant mobility deficits and safety concerns. Pt demonstrated improvements in assistance required for functional transfers this session. She is anxious at times, but receptive to encouragement and reassurance. Interventions today consisted of functional transfer training, LB ADL training, and pt/family education. Pt currently completing UB ADLs @ supervision/setup, LB ADLs @ minimal assistance, and functional mobility with RW and knee platform attachment @ steadying assistance. Pt demonstrating G participation and G potential to achieve goals but is currently demonstrating deficits in decrease activity tolerance, decrease standing balance, decrease performance during ADL tasks, decrease performance during functional transfers, steps to enter home, limited family support, and high fall risk. Continue to recommend Level I (Maximum Resource Intensity) upon  discharge to increase safety and regain her independence in ADL tasks and functional mobility. At end of session, pt sitting upright in bed with bed alarm on, call bell in reach, and all needs met.   Plan   Treatment Interventions ADL retraining;Functional transfer training;UE strengthening/ROM;Endurance training;Patient/family training;Equipment evaluation/education;Compensatory technique education;Continued evaluation;Energy conservation;Activityengagement   Goal Expiration Date 05/06/24   OT Treatment Day 1   OT Frequency 3-5x/wk   Discharge Recommendation   Rehab Resource Intensity Level, OT I (Maximum Resource Intensity)   AM-PAC Daily Activity Inpatient   Lower Body Dressing 3   Bathing 3   Toileting 3   Upper Body Dressing 3   Grooming 3   Eating 4   Daily Activity Raw Score 19   Daily Activity Standardized Score (Calc for Raw Score >=11) 40.22   AM-PAC Applied Cognition Inpatient   Following a Speech/Presentation 4   Understanding Ordinary Conversation 4   Taking Medications 4   Remembering Where Things Are Placed or Put Away 4   Remembering List of 4-5 Errands 4   Taking Care of Complicated Tasks 4   Applied Cognition Raw Score 24   Applied Cognition Standardized Score 62.21       The patient's raw score on the AM-PAC Daily Activity Inpatient Short Form is 19. A raw score of greater than or equal to 19 suggests the patient may benefit from discharge to home. Despite AM-PAC score, pt will most benefit from acute rehabilitation services to continue to maximize her safety and independence during ADLs and functional mobility with NWB status of the LLE. Please refer to the recommendation of the Occupational Therapist for safe discharge planning.    Pt goals to be met by 5/6/2024:     Pt will demonstrate ability to complete grooming/hygiene tasks @ mod I after set-up.  Pt will demonstrate ability to complete supine<>sit @ mod I in order to increase safety and independence during ADL tasks.  Pt will demonstrate  ability to complete UB ADLs including washing/dressing @ mod I in order to increase performance and participation during meaningful tasks  Pt will demonstrate ability to complete LB dressing @ mod I in order to increase safety and independence during meaningful tasks.   Pt will demonstrate ability to ana m/doff socks/shoes while sitting EOB/chair @ mod I in order to increase safety and independence during meaningful tasks.   Pt will demonstrate ability to complete toileting tasks including CM and pericare @ mod I in order to increase safety and independence during meaningful tasks.  Pt will demonstrate ability to complete EOB, chair, toilet/commode transfers @ mod I in order to increase performance and participation during functional tasks.  Pt will demonstrate ability to stand for 5 minutes while maintaining F+ balance with use of RW for UB support PRN.  Pt will demonstrate ability to tolerate 15 minute OT session with no vc'ing for deep breathing or use of energy conservation techniques in order to increase activity tolerance during functional tasks.   Pt will demonstrate Good carryover of use of energy conservation/compensatory strategies during ADLs and functional tasks in order to increase safety and reduce risk for falls.   Pt will demonstrate Good attention and participation in continued evaluation of functional ambulation house hold distances in order to assist with safe d/c planning.  Pt will attend to continued cognitive assessments 100% of the time in order to provide most appropriate d/c recommendations.   Pt will follow 100% simple 2-step commands and be A&O x4 consistently with environmental cues to increase participation in functional activities.   Pt will identify 3 areas of interest/hobbies and 1 intervention on how to incorporate into daily life in order to increase interaction with environment and peers as well as increase participation in meaningful tasks.   Pt will demonstrate 100% carryover of  BUE HEP in order to increase BUE MS and increase performance during functional tasks upon d/c home.    Deniz Amador, MS, OTR/L

## 2024-04-24 NOTE — ASSESSMENT & PLAN NOTE
She missed a step while going down her front porch.she had mechanical fall and fell sideways and felt pain in her left ankle.  Unable to stand afterwards despite help. Xray shows trimalleolar fracture of the left ankle.    Now status post repair by orthopedic surgery  Doing well postoperative with reduced pain  PT/OT - needs rehab  DVT prophylaxis, on Xarelto

## 2024-04-24 NOTE — PROGRESS NOTES
[facility]  Progress Note  Name: Karly Helton I  MRN: [id number]  Unit/Bed#: -01 I Date of Admission: 4/20/2024   Date of Service: 4/23/2024 I Hospital Day: 3    Assessment/Plan   * Closed fracture of left ankle with routine healing  Assessment & Plan  She missed a step while going down her front porch.she had mechanical fall and fell sideways and felt pain in her left ankle.  Unable to stand afterwards despite help. Xray shows trimalleolar fracture of the left ankle.    Now status post repair by orthopedic surgery  Doing well postoperative with reduced pain  PT/OT  DVT prophylaxis    Mixed hyperlipidemia  Assessment & Plan  Continue statin therapy    Essential hypertension  Assessment & Plan  Continue atenolol as per home regimen continue pain control    Prothrombin gene mutation (HCC)  Assessment & Plan  History Of DVT in the past.  Known history of prothrombin gene mutation.    Currently on Xarelto 10 mg daily.  Held preoperatively, to be resumed postop day 1  Currently on heparin for DVT prophylaxis               VTE Pharmacologic Prophylaxis:   Moderate Risk (Score 3-4) - Pharmacological DVT Prophylaxis Ordered: heparin.    Mobility:   Basic Mobility Inpatient Raw Score: 14  -HLM Goal: 4: Move to chair/commode  JH-HLM Achieved: 2: Bed activities/Dependent transfer  JH-HLM Goal NOT achieved. Continue with multidisciplinary rounding and encourage appropriate mobility to improve upon JH-HLM goals.    Patient Centered Rounds: I performed bedside rounds with nursing staff today.   Discussions with Specialists or Other Care Team Provider: none      Total Time Spent on Date of Encounter in care of patient: 26 mins. This time was spent on one or more of the following: performing physical exam; counseling and coordination of care; obtaining or reviewing history; documenting in the medical record; reviewing/ordering tests, medications or procedures; communicating with other  healthcare professionals and discussing with patient's family/caregivers.    Current Length of Stay: 3 day(s)  Current Patient Status: Inpatient   Certification Statement: The patient will continue to require additional inpatient hospital stay due to fracture s/p repair  Discharge Plan: Anticipate discharge tomorrow to rehab facility.    Code Status: Level 1 - Full Code    Subjective:   Patient feels good, saw her postoperative and she has minimal pain, is very satisfied with the service here    Objective:     Vitals:   Temp (24hrs), Av.4 °F (36.3 °C), Min:97 °F (36.1 °C), Max:98.1 °F (36.7 °C)    Temp:  [97 °F (36.1 °C)-98.1 °F (36.7 °C)] 97.2 °F (36.2 °C)  HR:  [59-76] 69  Resp:  [14-18] 18  BP: (134-183)/(66-88) 151/79  SpO2:  [93 %-97 %] 95 %  Body mass index is 39.6 kg/m².     Input and Output Summary (last 24 hours):     Intake/Output Summary (Last 24 hours) at 2024  Last data filed at 2024 1849  Gross per 24 hour   Intake 1150 ml   Output 2400 ml   Net -1250 ml       Physical Exam:   Physical Exam  Vitals and nursing note reviewed.   Constitutional:       General: She is not in acute distress.     Appearance: She is well-developed.   HENT:      Head: Normocephalic and atraumatic.   Eyes:      Conjunctiva/sclera: Conjunctivae normal.   Cardiovascular:      Rate and Rhythm: Normal rate and regular rhythm.      Heart sounds: No murmur heard.  Pulmonary:      Effort: Pulmonary effort is normal. No respiratory distress.      Breath sounds: Normal breath sounds.   Abdominal:      Palpations: Abdomen is soft.      Tenderness: There is no abdominal tenderness.   Musculoskeletal:         General: No swelling.      Cervical back: Neck supple.      Comments: Left lower extremity wrapped, mildly decreased sensation in the distal left lower extremity   Skin:     General: Skin is warm and dry.      Capillary Refill: Capillary refill takes less than 2 seconds.   Neurological:      Mental Status: She is  alert.   Psychiatric:         Mood and Affect: Mood normal.          Additional Data:     Labs:  Results from last 7 days   Lab Units 04/21/24  0510 04/20/24  1530   WBC Thousand/uL 8.51 8.11   HEMOGLOBIN g/dL 12.9 13.3   HEMATOCRIT % 38.8 40.6   PLATELETS Thousands/uL 164 185   SEGS PCT %  --  63   LYMPHO PCT %  --  24   MONO PCT %  --  10   EOS PCT %  --  2     Results from last 7 days   Lab Units 04/21/24  0510   SODIUM mmol/L 140   POTASSIUM mmol/L 3.8   CHLORIDE mmol/L 109*   CO2 mmol/L 25   BUN mg/dL 24   CREATININE mg/dL 0.94   ANION GAP mmol/L 6   CALCIUM mg/dL 9.5   ALBUMIN g/dL 3.7   TOTAL BILIRUBIN mg/dL 0.60   ALK PHOS U/L 41   ALT U/L 13   AST U/L 19   GLUCOSE RANDOM mg/dL 101     Results from last 7 days   Lab Units 04/21/24  0510   INR  0.98                   Lines/Drains:  Invasive Devices       Peripheral Intravenous Line  Duration             Peripheral IV 04/23/24 Distal;Dorsal (posterior);Right Forearm <1 day              Drain  Duration             External Urinary Catheter 1 day                          Imaging: No pertinent imaging reviewed.    Recent Cultures (last 7 days):         Last 24 Hours Medication List:   Current Facility-Administered Medications   Medication Dose Route Frequency Provider Last Rate    acetaminophen  975 mg Oral Q8H ANGELI Foreign Diverio      atenolol  25 mg Oral QAM Foreign Diverio      cefazolin  2,000 mg Intravenous Q8H Foreign Diverio      ezetimibe  10 mg Oral Daily Foreign Diverio      lactated ringers  1,000 mL Intravenous Once PRN Foreign Diverio      And    lactated ringers  1,000 mL Intravenous Once PRN Foreign Diverio      lactated ringers  100 mL/hr Intravenous Continuous Jenaro Cerna CRNA      morphine injection  2 mg Intravenous Q6H PRN Foreign Diverio      ondansetron  4 mg Intravenous Q6H PRN Foreign Diverio      oxyCODONE  10 mg Oral Q6H PRN Foreign Diverio      oxyCODONE  5 mg Oral Q6H PRN Foreign Diverio      pravastatin  20 mg Oral Daily With Dinner  Foreign De Paz      [START ON 4/24/2024] rivaroxaban  10 mg Oral Daily With Breakfast Foreign Diverio      sodium chloride  1,000 mL Intravenous Once PRN Foreign Divergonzalez      And    sodium chloride  1,000 mL Intravenous Once PRN Foreign Diverio          Today, Patient Was Seen By: Jaciel iWng DO    **Please Note: This note may have been constructed using a voice recognition system.**

## 2024-04-24 NOTE — CASE MANAGEMENT
Case Management Discharge Planning Note    Patient name Karly Helton  Location /-01 MRN [id number]  : 1950 Date 2024       Current Admission Date: 2024  Current Admission Diagnosis:Closed fracture of left ankle with routine healing   Patient Active Problem List    Diagnosis Date Noted    Closed fracture of left ankle with routine healing 2024    Prothrombin gene mutation (HCC) 2024    Essential hypertension 2024    Mixed hyperlipidemia 2024      LOS (days): 4  Geometric Mean LOS (GMLOS) (days): 4.2  Days to GMLOS:0.4     OBJECTIVE:  Risk of Unplanned Readmission Score: 9.41         Current admission status: Inpatient   Preferred Pharmacy:   [facility]/pharmacy #[id number] - [place], [place]  [facility] [address]  Phone: [phone] Fax: [phone]    Primary Care Provider: No primary care provider on file.    [facility]  Secondary Insurance:     DISCHARGE DETAILS:  Additional Comments: CM met with pt and pt's daughter at bedside for continued discharge planning discussion. Pt reports [facility] include 234 beds at the [facility] acute rehab is her first choice. CM left  for [facility] and is awaiting reponse as to if they have bed availability and can accept pt. Pt would like to go to [facility] if [facility] does not accept. CM to continue to follow for discharge planning needs.     ADDENDUM 1:43pm: No reponse from [facility] include 234 beds at the [facility]. Pt agreeable to [facility] acute rehab. CM submitted NPI information to  discharge support for initiation of insurance authorization.

## 2024-04-24 NOTE — ASSESSMENT & PLAN NOTE
History Of DVT in the past.  Known history of prothrombin gene mutation.    Currently on Xarelto 10 mg daily.  Held preoperatively, and now resumed

## 2024-04-24 NOTE — ASSESSMENT & PLAN NOTE
History Of DVT in the past.  Known history of prothrombin gene mutation.    Currently on Xarelto 10 mg daily.  Held preoperatively, to be resumed postop day 1  Currently on heparin for DVT prophylaxis

## 2024-04-24 NOTE — PLAN OF CARE
Problem: PAIN - ADULT  Goal: Verbalizes/displays adequate comfort level or baseline comfort level  Description: Interventions:  - Encourage patient to monitor pain and request assistance  - Assess pain using appropriate pain scale  - Administer analgesics based on type and severity of pain and evaluate response  - Implement non-pharmacological measures as appropriate and evaluate response  - Consider cultural and social influences on pain and pain management  - Notify physician/advanced practitioner if interventions unsuccessful or patient reports new pain  Outcome: Progressing     Problem: INFECTION - ADULT  Goal: Absence or prevention of progression during hospitalization  Description: INTERVENTIONS:  - Assess and monitor for signs and symptoms of infection  - Monitor lab/diagnostic results  - Monitor all insertion sites, i.e. indwelling lines, tubes, and drains  - Monitor endotracheal if appropriate and nasal secretions for changes in amount and color  - [place] appropriate cooling/warming therapies per order  - Administer medications as ordered  - Instruct and encourage patient and family to use good hand hygiene technique  - Identify and instruct in appropriate isolation precautions for identified infection/condition  Outcome: Progressing     Problem: SAFETY ADULT  Goal: Patient will remain free of falls  Description: INTERVENTIONS:  - Educate patient/family on patient safety including physical limitations  - Instruct patient to call for assistance with activity   - Consult OT/PT to assist with strengthening/mobility   - Keep Call bell within reach  - Keep bed low and locked with side rails adjusted as appropriate  - Keep care items and personal belongings within reach  - Initiate and maintain comfort rounds  - Make Fall Risk Sign visible to staff  - Offer Toileting every 2 Hours, in advance of need  - Initiate/Maintain   alarm  - Obtain necessary fall risk management equipment:     - Apply yellow socks and  bracelet for high fall risk patients  - Consider moving patient to room near nurses station  Outcome: Progressing  Goal: Maintain or return to baseline ADL function  Description: INTERVENTIONS:  -  Assess patient's ability to carry out ADLs; assess patient's baseline for ADL function and identify physical deficits which impact ability to perform ADLs (bathing, care of mouth/teeth, toileting, grooming, dressing, etc.)  - Assess/evaluate cause of self-care deficits   - Assess range of motion  - Assess patient's mobility; develop plan if impaired  - Assess patient's need for assistive devices and provide as appropriate  - Encourage maximum independence but intervene and supervise when necessary  - Involve family in performance of ADLs  - Assess for home care needs following discharge   - Consider OT consult to assist with ADL evaluation and planning for discharge  - Provide patient education as appropriate  Outcome: Progressing  Goal: Maintains/Returns to pre admission functional level  Description: INTERVENTIONS:  - Perform AM-PAC 6 Click Basic Mobility/ Daily Activity assessment daily.  - Set and communicate daily mobility goal to care team and patient/family/caregiver.   - Collaborate with rehabilitation services on mobility goals if consulted  - Perform Range of Motion 3 times a day.  - Reposition patient every 2 hours.  - Dangle patient 3 times a day  - Stand patient 3 times a day  - Ambulate patient 3 times a day  - Out of bed to chair 3 times a day   - Out of bed for meals 3 times a day  - Out of bed for toileting  - Record patient progress and toleration of activity level   Outcome: Progressing     Problem: DISCHARGE PLANNING  Goal: Discharge to home or other facility with appropriate resources  Description: INTERVENTIONS:  - Identify barriers to discharge w/patient and caregiver  - Arrange for needed discharge resources and transportation as appropriate  - Identify discharge learning needs (meds, wound care,  etc.)  - Arrange for interpretive services to assist at discharge as needed  - Refer to Case Management Department for coordinating discharge planning if the patient needs post-hospital services based on physician/advanced practitioner order or complex needs related to functional status, cognitive ability, or social support system  Outcome: Progressing     Problem: Knowledge Deficit  Goal: Patient/family/caregiver demonstrates understanding of disease process, treatment plan, medications, and discharge instructions  Description: Complete learning assessment and assess knowledge base.  Interventions:  - Provide teaching at level of understanding  - Provide teaching via preferred learning methods  Outcome: Progressing     Problem: CARDIOVASCULAR - ADULT  Goal: Maintains optimal cardiac output and hemodynamic stability  Description: INTERVENTIONS:  - Monitor I/O, vital signs and rhythm  - Monitor for S/S and trends of decreased cardiac output  - Administer and titrate ordered vasoactive medications to optimize hemodynamic stability  - Assess quality of pulses, skin color and temperature  - Assess for signs of decreased coronary artery perfusion  - Instruct patient to report change in severity of symptoms  Outcome: Progressing  Goal: Absence of cardiac dysrhythmias or at baseline rhythm  Description: INTERVENTIONS:  - Continuous cardiac monitoring, vital signs, obtain 12 lead EKG if ordered  - Administer antiarrhythmic and heart rate control medications as ordered  - Monitor electrolytes and administer replacement therapy as ordered  Outcome: Progressing     Problem: MUSCULOSKELETAL - ADULT  Goal: Maintain or return mobility to safest level of function  Description: INTERVENTIONS:  - Assess patient's ability to carry out ADLs; assess patient's baseline for ADL function and identify physical deficits which impact ability to perform ADLs (bathing, care of mouth/teeth, toileting, grooming, dressing, etc.)  -  Assess/evaluate cause of self-care deficits   - Assess range of motion  - Assess patient's mobility  - Assess patient's need for assistive devices and provide as appropriate  - Encourage maximum independence but intervene and supervise when necessary  - Involve family in performance of ADLs  - Assess for home care needs following discharge   - Consider OT consult to assist with ADL evaluation and planning for discharge  - Provide patient education as appropriate  Outcome: Progressing  Goal: Maintain proper alignment of affected body part  Description: INTERVENTIONS:  - Support, maintain and protect limb and body alignment  - Provide patient/ family with appropriate education  Outcome: Progressing     Problem: Prexisting or High Potential for Compromised Skin Integrity  Goal: Skin integrity is maintained or improved  Description: INTERVENTIONS:  - Identify patients at risk for skin breakdown  - Assess and monitor skin integrity  - Assess and monitor nutrition and hydration status  - Monitor labs   - Assess for incontinence   - Turn and reposition patient  - Assist with mobility/ambulation  - Relieve pressure over bony prominences  - Avoid friction and shearing  - Provide appropriate hygiene as needed including keeping skin clean and dry  - Evaluate need for skin moisturizer/barrier cream  - Collaborate with interdisciplinary team   - Patient/family teaching  - Consider wound care consult   Outcome: Progressing

## 2024-04-24 NOTE — PHYSICAL THERAPY NOTE
" PHYSICAL THERAPY TREATMENT NOTE      NAME:  Karly Helton  DATE: 04/24/24    Length Of Stay: 4  Performed at least 2 patient identifiers during session: Name and Birthday    TREATMENT FLOW SHEET:       04/24/24 1117   PT Last Visit   PT Visit Date 04/24/24   Note Type   Note Type Treatment   Pain Assessment   Pain Assessment Tool 0-10   Pain Score No Pain   Restrictions/Precautions   Weight Bearing Precautions Per Order Yes   LLE Weight Bearing Per Order NWB   Other Precautions Chair Alarm;Bed Alarm;Fall Risk   General   Chart Reviewed Yes   Response to Previous Treatment Patient with no complaints from previous session.   Family/Caregiver Present Yes  (dtr)   Cognition   Overall Cognitive Status WFL   Arousal/Participation Cooperative   Orientation Level Oriented X4   Following Commands Follows one step commands with increased time or repetition   Bed Mobility   Supine to Sit 5  Supervision   Additional items Increased time required;Verbal cues   Transfers   Sit to Stand 4  Minimal assistance  (up to RW with L knee platform attachment)   Additional items Assist x 1;Increased time required;Verbal cues   Stand to Sit   (steadying assist)   Additional items Assist x 1;Increased time required;Verbal cues   Stand pivot   (steadying assist using RW with L knee walker attachment)   Additional items Assist x 1;Increased time required;Verbal cues   Sit pivot   (to R:  min A;  to L:  min A with less movement efficiency/safety compared to transferring to R (PT ensuring NWB status L LE))   Additional Comments After initial trial sit to stand of min A x 1 pt progresses to steadying assist with VCs for proper hand placement (1 hand on RW and 1 hand pushing up from seating surface's arm rest)   Ambulation/Elevation   Gait pattern   (\"hopping\" on R LE)   Gait Assistance   (steadying assist)   Additional items Assist x 1;Verbal cues   Assistive Device Rolling walker  (with L knee support attachment)   Distance 23 ft x 1; 11 ft x " 2   Stair Management Assistance Not tested   Additional items   (pt states she is having ramp installed for 3 JULIEN)   Balance   Static Sitting Good   Dynamic Sitting Fair +   Static Standing Fair -   Dynamic Standing Poor +   Ambulatory Fair -   Endurance Deficit   Endurance Deficit Yes   Endurance Deficit Description fatigue   Activity Tolerance   Activity Tolerance Patient limited by fatigue   Medical Staff Made Aware OT Aleksander De Oliveira   Assessment   Prognosis Excellent   Problem List Decreased strength;Decreased endurance;Impaired balance;Decreased range of motion;Decreased mobility;Obesity;Orthopedic restrictions;Decreased skin integrity   Assessment Pt tolerates tx session fairly well.  Interventions including pt/family education, transfer training, bed mobility training, and gait training.  Pt with improved adherence/ability to comply with NWB status L LE during sit<>stand transitions.  Pt somewhat anxious/needing reminders on how to effectively perform mobility in safest manner, but she makes nice functional progress since initial eval.  No c/o feeling dizzy, SOB, or lightheaded throughout.  Pt has a clear D/C plan post acute rehab with support from her dtr and ramped entry.  Anticipate continued progress in regards to ADLs, transfers, and overall mobility.   Barriers to Discharge Other (Comment)   Barriers to Discharge Comments NWB status L LE; anxiety regarding mobility   Goals   STG Expiration Date 05/06/24   PT Treatment Day 2   Plan   Treatment/Interventions Functional transfer training;LE strengthening/ROM;ADL retraining;Therapeutic exercise;Endurance training;Patient/family training;Equipment eval/education;Bed mobility;Gait training;Compensatory technique education;Spoke to nursing;Spoke to case management;OT   Progress Progressing toward goals   PT Frequency 3-5x/wk   Discharge Recommendation   Rehab Resource Intensity Level, PT I (Maximum Resource Intensity)   AM-PAC Basic Mobility Inpatient    Turning in Flat Bed Without Bedrails 3   Lying on Back to Sitting on Edge of Flat Bed Without Bedrails 3   Moving Bed to Chair 3   Standing Up From Chair Using Arms 3   Walk in Room 3   Climb 3-5 Stairs With Railing 1   Basic Mobility Inpatient Raw Score 16   Basic Mobility Standardized Score 38.32   [facility] Highest Level Of Mobility   -HL Goal 5: Stand one or more mins   -HLM Achieved 7: Walk 25 feet or more   Education   Education Provided Mobility training;Assistive device   Patient Demonstrates acceptance/verbal understanding   End of Consult   Patient Position at End of Consult Supine;Bed/Chair alarm activated;All needs within reach   End of Consult Comments dtr present       The patient's AM-PAC Basic Mobility Inpatient Short Form Raw Score is 16. A Raw score of less than or equal to 16 suggests the patient may benefit from discharge to post-acute rehabilitation services. Please also refer to the recommendation of the Physical Therapist for safe discharge planning.         Alexis Hendricks, PT,DPT

## 2024-04-24 NOTE — PLAN OF CARE
Problem: PHYSICAL THERAPY ADULT  Goal: Performs mobility at highest level of function for planned discharge setting.  See evaluation for individualized goals.  Description: Treatment/Interventions: Functional transfer training, LE strengthening/ROM, Elevations, ADL retraining, Therapeutic exercise, Endurance training, Patient/family training, Equipment eval/education, Bed mobility, Gait training, Compensatory technique education, OT          See flowsheet documentation for full assessment, interventions and recommendations.  Outcome: Progressing  Note: Prognosis: Excellent  Problem List: Decreased strength, Decreased endurance, Impaired balance, Decreased range of motion, Decreased mobility, Obesity, Orthopedic restrictions, Decreased skin integrity  Assessment: Pt tolerates tx session fairly well.  Interventions including pt/family education, transfer training, bed mobility training, and gait training.  Pt with improved adherence/ability to comply with NWB status L LE during sit<>stand transitions.  Pt somewhat anxious/needing reminders on how to effectively perform mobility in safest manner, but she makes nice functional progress since initial eval.  No c/o feeling dizzy, SOB, or lightheaded throughout.  Pt has a clear D/C plan post acute rehab with support from her dtr and ramped entry.  Anticipate continued progress in regards to ADLs, transfers, and overall mobility.  Barriers to Discharge: Other (Comment)  Barriers to Discharge Comments: NWB status L LE; anxiety regarding mobility  Rehab Resource Intensity Level, PT: I (Maximum Resource Intensity)    See flowsheet documentation for full assessment.

## 2024-04-24 NOTE — PLAN OF CARE
Problem: PAIN - ADULT  Goal: Verbalizes/displays adequate comfort level or baseline comfort level  Description: Interventions:  - Encourage patient to monitor pain and request assistance  - Assess pain using appropriate pain scale  - Administer analgesics based on type and severity of pain and evaluate response  - Implement non-pharmacological measures as appropriate and evaluate response  - Consider cultural and social influences on pain and pain management  - Notify physician/advanced practitioner if interventions unsuccessful or patient reports new pain  Outcome: Progressing     Problem: INFECTION - ADULT  Goal: Absence or prevention of progression during hospitalization  Description: INTERVENTIONS:  - Assess and monitor for signs and symptoms of infection  - Monitor lab/diagnostic results  - Monitor all insertion sites, i.e. indwelling lines, tubes, and drains  - Monitor endotracheal if appropriate and nasal secretions for changes in amount and color  - [place] appropriate cooling/warming therapies per order  - Administer medications as ordered  - Instruct and encourage patient and family to use good hand hygiene technique  - Identify and instruct in appropriate isolation precautions for identified infection/condition  Outcome: Progressing     Problem: SAFETY ADULT  Goal: Patient will remain free of falls  Description: INTERVENTIONS:  - Educate patient/family on patient safety including physical limitations  - Instruct patient to call for assistance with activity   - Consult OT/PT to assist with strengthening/mobility   - Keep Call bell within reach  - Keep bed low and locked with side rails adjusted as appropriate  - Keep care items and personal belongings within reach  - Initiate and maintain comfort rounds  - Make Fall Risk Sign visible to staff  - Offer Toileting every 2 Hours, in advance of need  - Initiate/Maintain bed alarm  - Obtain necessary fall risk management equipment  - Apply yellow socks and  bracelet for high fall risk patients  - Consider moving patient to room near nurses station  Outcome: Progressing  Goal: Maintain or return to baseline ADL function  Description: INTERVENTIONS:  -  Assess patient's ability to carry out ADLs; assess patient's baseline for ADL function and identify physical deficits which impact ability to perform ADLs (bathing, care of mouth/teeth, toileting, grooming, dressing, etc.)  - Assess/evaluate cause of self-care deficits   - Assess range of motion  - Assess patient's mobility; develop plan if impaired  - Assess patient's need for assistive devices and provide as appropriate  - Encourage maximum independence but intervene and supervise when necessary  - Involve family in performance of ADLs  - Assess for home care needs following discharge   - Consider OT consult to assist with ADL evaluation and planning for discharge  - Provide patient education as appropriate  Outcome: Progressing  Goal: Maintains/Returns to pre admission functional level  Description: INTERVENTIONS:  - Perform AM-PAC 6 Click Basic Mobility/ Daily Activity assessment daily.  - Set and communicate daily mobility goal to care team and patient/family/caregiver.   - Collaborate with rehabilitation services on mobility goals if consulted  - Record patient progress and toleration of activity level   Outcome: Progressing     Problem: DISCHARGE PLANNING  Goal: Discharge to home or other facility with appropriate resources  Description: INTERVENTIONS:  - Identify barriers to discharge w/patient and caregiver  - Arrange for needed discharge resources and transportation as appropriate  - Identify discharge learning needs (meds, wound care, etc.)  - Arrange for interpretive services to assist at discharge as needed  - Refer to Case Management Department for coordinating discharge planning if the patient needs post-hospital services based on physician/advanced practitioner order or complex needs related to functional  status, cognitive ability, or social support system  Outcome: Progressing     Problem: Knowledge Deficit  Goal: Patient/family/caregiver demonstrates understanding of disease process, treatment plan, medications, and discharge instructions  Description: Complete learning assessment and assess knowledge base.  Interventions:  - Provide teaching at level of understanding  - Provide teaching via preferred learning methods  Outcome: Progressing     Problem: CARDIOVASCULAR - ADULT  Goal: Maintains optimal cardiac output and hemodynamic stability  Description: INTERVENTIONS:  - Monitor I/O, vital signs and rhythm  - Monitor for S/S and trends of decreased cardiac output  - Administer and titrate ordered vasoactive medications to optimize hemodynamic stability  - Assess quality of pulses, skin color and temperature  - Assess for signs of decreased coronary artery perfusion  - Instruct patient to report change in severity of symptoms  Outcome: Progressing  Goal: Absence of cardiac dysrhythmias or at baseline rhythm  Description: INTERVENTIONS:  - Continuous cardiac monitoring, vital signs, obtain 12 lead EKG if ordered  - Administer antiarrhythmic and heart rate control medications as ordered  - Monitor electrolytes and administer replacement therapy as ordered  Outcome: Progressing     Problem: MUSCULOSKELETAL - ADULT  Goal: Maintain or return mobility to safest level of function  Description: INTERVENTIONS:  - Assess patient's ability to carry out ADLs; assess patient's baseline for ADL function and identify physical deficits which impact ability to perform ADLs (bathing, care of mouth/teeth, toileting, grooming, dressing, etc.)  - Assess/evaluate cause of self-care deficits   - Assess range of motion  - Assess patient's mobility  - Assess patient's need for assistive devices and provide as appropriate  - Encourage maximum independence but intervene and supervise when necessary  - Involve family in performance of ADLs  -  Assess for home care needs following discharge   - Consider OT consult to assist with ADL evaluation and planning for discharge  - Provide patient education as appropriate  Outcome: Progressing  Goal: Maintain proper alignment of affected body part  Description: INTERVENTIONS:  - Support, maintain and protect limb and body alignment  - Provide patient/ family with appropriate education  Outcome: Progressing     Problem: Prexisting or High Potential for Compromised Skin Integrity  Goal: Skin integrity is maintained or improved  Description: INTERVENTIONS:  - Identify patients at risk for skin breakdown  - Assess and monitor skin integrity  - Assess and monitor nutrition and hydration status  - Monitor labs   - Assess for incontinence   - Turn and reposition patient  - Assist with mobility/ambulation  - Relieve pressure over bony prominences  - Avoid friction and shearing  - Provide appropriate hygiene as needed including keeping skin clean and dry  - Evaluate need for skin moisturizer/barrier cream  - Collaborate with interdisciplinary team   - Patient/family teaching  - Consider wound care consult   Outcome: Progressing

## 2024-04-24 NOTE — CASE MANAGEMENT
[facility] received request for authorization from Care Manager.  Authorization request for: Acute Rehab  Facility Name: [facility]   NPI:  [id number]  Facility MD: Don Nava     NPI: [id number]  Authorization initiated by contacting insurance:  FORTINO  Via: [employer]   Clinicals submitted via: [employer] attachment   Pending Reference #:[id number]    Care Manager notified:  Yennifer Anguiano

## 2024-04-25 LAB
ANION GAP SERPL CALCULATED.3IONS-SCNC: 5 MMOL/L (ref 4–13)
BUN SERPL-MCNC: 24 MG/DL (ref 5–25)
CALCIUM SERPL-MCNC: 9.3 MG/DL (ref 8.4–10.2)
CHLORIDE SERPL-SCNC: 110 MMOL/L (ref 96–108)
CO2 SERPL-SCNC: 27 MMOL/L (ref 21–32)
CREAT SERPL-MCNC: 0.92 MG/DL (ref 0.6–1.3)
GFR SERPL CREATININE-BSD FRML MDRD: 61 ML/MIN/1.73SQ M
GLUCOSE SERPL-MCNC: 83 MG/DL (ref 65–140)
POTASSIUM SERPL-SCNC: 4.1 MMOL/L (ref 3.5–5.3)
SODIUM SERPL-SCNC: 142 MMOL/L (ref 135–147)

## 2024-04-25 PROCEDURE — 80048 BASIC METABOLIC PNL TOTAL CA: CPT | Performed by: ORTHOPAEDIC SURGERY

## 2024-04-25 PROCEDURE — 97530 THERAPEUTIC ACTIVITIES: CPT

## 2024-04-25 PROCEDURE — 99232 SBSQ HOSP IP/OBS MODERATE 35: CPT | Performed by: HOSPITALIST

## 2024-04-25 PROCEDURE — 97116 GAIT TRAINING THERAPY: CPT

## 2024-04-25 PROCEDURE — 99024 POSTOP FOLLOW-UP VISIT: CPT | Performed by: ORTHOPAEDIC SURGERY

## 2024-04-25 RX ADMIN — EZETIMIBE 10 MG: 10 TABLET ORAL at 08:14

## 2024-04-25 RX ADMIN — ACETAMINOPHEN 325MG 975 MG: 325 TABLET ORAL at 23:41

## 2024-04-25 RX ADMIN — ATENOLOL 25 MG: 25 TABLET ORAL at 08:14

## 2024-04-25 RX ADMIN — ACETAMINOPHEN 325MG 975 MG: 325 TABLET ORAL at 08:14

## 2024-04-25 RX ADMIN — ACETAMINOPHEN 325MG 975 MG: 325 TABLET ORAL at 16:05

## 2024-04-25 RX ADMIN — RIVAROXABAN 10 MG: 10 TABLET, FILM COATED ORAL at 08:14

## 2024-04-25 RX ADMIN — PRAVASTATIN SODIUM 20 MG: 20 TABLET ORAL at 16:06

## 2024-04-25 NOTE — CASE MANAGEMENT
Case Management Discharge Planning Note    Patient name Karly Helton  Location /-01 MRN [id number]  : 1950 Date 2024       Current Admission Date: 2024  Current Admission Diagnosis:Closed fracture of left ankle with routine healing   Patient Active Problem List    Diagnosis Date Noted    Closed fracture of left ankle with routine healing 2024    Prothrombin gene mutation (HCC) 2024    Essential hypertension 2024    Mixed hyperlipidemia 2024      LOS (days): 5  Geometric Mean LOS (GMLOS) (days): 4.2  Days to GMLOS:-0.8     OBJECTIVE:  Risk of Unplanned Readmission Score: 9.35         Current admission status: Inpatient   Preferred Pharmacy:   [facility]/pharmacy #[id number] - [place], [place] - [facility] [address] [address]  Phone: [phone] Fax: [phone]    Primary Care Provider: No primary care provider on file.    Primary Insurance: [employer]  Secondary Insurance:     DISCHARGE DETAILS:       Requested Home Health Care         Is the patient interested in HHC at discharge?: No    DME Referral Provided  Referral made for DME?: No    Other Referral/Resources/Interventions Provided:  Interventions: Short Term Rehab  Referral Comments: [facility] Diaz - auth pending    Would you like to participate in our [facility] service program?  : No - Declined    Treatment Team Recommendation: Acute Rehab  Discharge Destination Plan:: Short Term Rehab, SNF  Transport at Discharge : Wheelchair van        CM spoke to CM discharge support to escalate auth, shortly after received intent to deny for acute.     CM met with patient and daughter to review intent to deny acute rehab. Preference is STR [facility]. Discussed with patient that transportation via WC is not covered by insurance and patient will be billed for this service. Verbalized understanding.   Family will make decision for WC van vs. Family tomorrow depending on  patient functioning.   Patient wanted STR to be aware of ortho recs of dressing changes.     CM confirmed [facility] (patient preference for STR) had bed availability, they do. CM reserved in [facility], informed them of auth process and reiterated patient's ortho recs.     CM submitted auth for STR at [facility].    CM to follow patient's care and discharge needs.

## 2024-04-25 NOTE — PHYSICAL THERAPY NOTE
" PHYSICAL THERAPY TREATMENT NOTE      NAME:  Karly Helton  DATE: 04/25/24    Length Of Stay: 5  Performed at least 2 patient identifiers during session: Name and Birthday    TREATMENT FLOW SHEET:       04/25/24 1548   PT Last Visit   PT Visit Date 04/25/24   Note Type   Note Type Treatment   Pain Assessment   Pain Assessment Tool 0-10   Pain Score No Pain   Restrictions/Precautions   Weight Bearing Precautions Per Order Yes   LLE Weight Bearing Per Order NWB   Other Precautions Chair Alarm;Bed Alarm;Fall Risk   General   Chart Reviewed Yes   Response to Previous Treatment Patient with no complaints from previous session.   Family/Caregiver Present No   Cognition   Overall Cognitive Status WFL   Arousal/Participation Cooperative   Orientation Level Oriented X4   Following Commands Follows one step commands with increased time or repetition   Bed Mobility   Supine to Sit 6  Modified independent   Sit to Supine 5  Supervision   Additional items Increased time required   Transfers   Sit to Stand   (min - steadying assist)   Additional items Assist x 1;Increased time required;Verbal cues   Stand to Sit   (steadying - min A)   Additional items Assist x 1;Increased time required;Verbal cues  (poor eccentric control)   Stand pivot   (SBA)   Additional items Assist x 1;Increased time required;Verbal cues   Additional Comments RW with L knee platform attachment.  Pt continues to demonstrate some anxiety in regards to performing transfers the \"correct\" way.   Ambulation/Elevation   Gait pattern   (\"hopping\" on R LE)   Gait Assistance   (SBA)   Additional items Assist x 1;Verbal cues   Assistive Device Rolling walker  (with L knee support)   Distance 25 ft   Stair Management Assistance Not tested   Balance   Static Sitting Good   Dynamic Sitting Fair +   Static Standing Fair   Dynamic Standing Poor +   Ambulatory Fair -   Endurance Deficit   Endurance Deficit Yes   Activity Tolerance   Activity Tolerance Patient limited by " fatigue   Assessment   Prognosis Excellent   Problem List Decreased strength;Decreased endurance;Impaired balance;Decreased range of motion;Decreased mobility;Obesity;Orthopedic restrictions;Decreased skin integrity   Assessment Pt tolerates tx session fairly well.  She is making progress in terms of utilizing the RW with L knee platform attachment.  She will continue to benefit from skilled PT services to maximize her safety and functional mobility independence.   Barriers to Discharge Other (Comment)   Barriers to Discharge Comments NWB status L LE, anxiety regarding mobility   Goals   STG Expiration Date 05/06/24   PT Treatment Day 3   Plan   Progress Progressing toward goals   PT Frequency 3-5x/wk   Discharge Recommendation   Rehab Resource Intensity Level, PT I (Maximum Resource Intensity)   AM-PAC Basic Mobility Inpatient   Turning in Flat Bed Without Bedrails 3   Lying on Back to Sitting on Edge of Flat Bed Without Bedrails 4   Moving Bed to Chair 3   Standing Up From Chair Using Arms 3   Walk in Room 3   Climb 3-5 Stairs With Railing 1   Basic Mobility Inpatient Raw Score 17   Basic Mobility Standardized Score 39.67   [facility] Highest Level Of Mobility   -[facility] Goal 5: Stand one or more mins   -[facility] Achieved 7: Walk 25 feet or more   Education   Education Provided Mobility training   Patient Demonstrates acceptance/verbal understanding   End of Consult   Patient Position at End of Consult Supine;Bed/Chair alarm activated;All needs within reach       The patient's AM-PAC Basic Mobility Inpatient Short Form Raw Score is 17. A Raw score of greater than 16 suggests the patient may benefit from discharge to home. Please also refer to the recommendation of the Physical Therapist for safe discharge planning.         Alexis Hendricks, PT,DPT

## 2024-04-25 NOTE — CASE MANAGEMENT
[facility] received request for authorization from Care Manager.  Authorization request for: [facility]  Facility Name: [facility]  NPI:[id number]   Facility MD: DR. Pari Camara    NPI: [id number]  Authorization initiated by contacting insurance:  [employer]ORTINO  Via: [employer]   Clinicals submitted via: [employer] attachment   Pending Reference #:[id number]    Care Manager notified:  Emmie Frederick

## 2024-04-25 NOTE — PLAN OF CARE
Problem: PAIN - ADULT  Goal: Verbalizes/displays adequate comfort level or baseline comfort level  Description: Interventions:  - Encourage patient to monitor pain and request assistance  - Assess pain using appropriate pain scale  - Administer analgesics based on type and severity of pain and evaluate response  - Implement non-pharmacological measures as appropriate and evaluate response  - Consider cultural and social influences on pain and pain management  - Notify physician/advanced practitioner if interventions unsuccessful or patient reports new pain  4/25/2024 0227 by Gloria Collazo RN  Outcome: Progressing  4/25/2024 0227 by Gloria Collazo RN  Outcome: Progressing     Problem: INFECTION - ADULT  Goal: Absence or prevention of progression during hospitalization  Description: INTERVENTIONS:  - Assess and monitor for signs and symptoms of infection  - Monitor lab/diagnostic results  - Monitor all insertion sites, i.e. indwelling lines, tubes, and drains  - Monitor endotracheal if appropriate and nasal secretions for changes in amount and color  - [place] appropriate cooling/warming therapies per order  - Administer medications as ordered  - Instruct and encourage patient and family to use good hand hygiene technique  - Identify and instruct in appropriate isolation precautions for identified infection/condition  4/25/2024 0227 by Gloria Collazo RN  Outcome: Progressing  4/25/2024 0227 by Gloria Collazo RN  Outcome: Progressing     Problem: SAFETY ADULT  Goal: Patient will remain free of falls  Description: INTERVENTIONS:  - Educate patient/family on patient safety including physical limitations  - Instruct patient to call for assistance with activity   - Consult OT/PT to assist with strengthening/mobility   - Keep Call bell within reach  - Keep bed low and locked with side rails adjusted as appropriate  - Keep care items and personal belongings within reach  - Initiate and maintain comfort rounds  - Apply yellow  socks and bracelet for high fall risk patients  - Consider moving patient to room near nurses station  4/25/2024 0227 by Gloria Collazo RN  Outcome: Progressing  4/25/2024 0227 by Gloria Collazo RN  Outcome: Progressing  Goal: Maintain or return to baseline ADL function  Description: INTERVENTIONS:  -  Assess patient's ability to carry out ADLs; assess patient's baseline for ADL function and identify physical deficits which impact ability to perform ADLs (bathing, care of mouth/teeth, toileting, grooming, dressing, etc.)  - Assess/evaluate cause of self-care deficits   - Assess range of motion  - Assess patient's mobility; develop plan if impaired  - Assess patient's need for assistive devices and provide as appropriate  - Encourage maximum independence but intervene and supervise when necessary  - Involve family in performance of ADLs  - Assess for home care needs following discharge   - Consider OT consult to assist with ADL evaluation and planning for discharge  - Provide patient education as appropriate  4/25/2024 0227 by Gloria Collazo RN  Outcome: Progressing  4/25/2024 0227 by Gloria Collazo RN  Outcome: Progressing  Goal: Maintains/Returns to pre admission functional level  Description: INTERVENTIONS:  - Perform AM-PAC 6 Click Basic Mobility/ Daily Activity assessment daily.  - Set and communicate daily mobility goal to care team and patient/family/caregiver.   - Record patient progress and toleration of activity level   4/25/2024 0227 by Gloria Collazo RN  Outcome: Progressing  4/25/2024 0227 by Gloria Collazo RN  Outcome: Progressing     Problem: DISCHARGE PLANNING  Goal: Discharge to home or other facility with appropriate resources  Description: INTERVENTIONS:  - Identify barriers to discharge w/patient and caregiver  - Arrange for needed discharge resources and transportation as appropriate  - Identify discharge learning needs (meds, wound care, etc.)  - Arrange for interpretive services to assist at discharge as  needed  - Refer to Case Management Department for coordinating discharge planning if the patient needs post-hospital services based on physician/advanced practitioner order or complex needs related to functional status, cognitive ability, or social support system  4/25/2024 0227 by Gloria Collazo RN  Outcome: Progressing  4/25/2024 0227 by Gloria Collazo RN  Outcome: Progressing     Problem: Knowledge Deficit  Goal: Patient/family/caregiver demonstrates understanding of disease process, treatment plan, medications, and discharge instructions  Description: Complete learning assessment and assess knowledge base.  Interventions:  - Provide teaching at level of understanding  - Provide teaching via preferred learning methods  4/25/2024 0227 by Gloria Collazo RN  Outcome: Progressing  4/25/2024 0227 by Gloria Collazo RN  Outcome: Progressing     Problem: CARDIOVASCULAR - ADULT  Goal: Maintains optimal cardiac output and hemodynamic stability  Description: INTERVENTIONS:  - Monitor I/O, vital signs and rhythm  - Monitor for S/S and trends of decreased cardiac output  - Administer and titrate ordered vasoactive medications to optimize hemodynamic stability  - Assess quality of pulses, skin color and temperature  - Assess for signs of decreased coronary artery perfusion  - Instruct patient to report change in severity of symptoms  4/25/2024 0227 by Gloria Collazo RN  Outcome: Progressing  4/25/2024 0227 by Gloria Collazo RN  Outcome: Progressing  Goal: Absence of cardiac dysrhythmias or at baseline rhythm  Description: INTERVENTIONS:  - Continuous cardiac monitoring, vital signs, obtain 12 lead EKG if ordered  - Administer antiarrhythmic and heart rate control medications as ordered  - Monitor electrolytes and administer replacement therapy as ordered  4/25/2024 0227 by Gloria Collazo RN  Outcome: Progressing  4/25/2024 0227 by Gloria Collazo RN  Outcome: Progressing     Problem: MUSCULOSKELETAL - ADULT  Goal: Maintain or return mobility  to safest level of function  Description: INTERVENTIONS:  - Assess patient's ability to carry out ADLs; assess patient's baseline for ADL function and identify physical deficits which impact ability to perform ADLs (bathing, care of mouth/teeth, toileting, grooming, dressing, etc.)  - Assess/evaluate cause of self-care deficits   - Assess range of motion  - Assess patient's mobility  - Assess patient's need for assistive devices and provide as appropriate  - Encourage maximum independence but intervene and supervise when necessary  - Involve family in performance of ADLs  - Assess for home care needs following discharge   - Consider OT consult to assist with ADL evaluation and planning for discharge  - Provide patient education as appropriate  4/25/2024 0227 by Gloria Collazo RN  Outcome: Progressing  4/25/2024 0227 by Gloria Collazo RN  Outcome: Progressing  Goal: Maintain proper alignment of affected body part  Description: INTERVENTIONS:  - Support, maintain and protect limb and body alignment  - Provide patient/ family with appropriate education  4/25/2024 0227 by Gloria Collazo RN  Outcome: Progressing  4/25/2024 0227 by Gloria Collazo RN  Outcome: Progressing     Problem: Prexisting or High Potential for Compromised Skin Integrity  Goal: Skin integrity is maintained or improved  Description: INTERVENTIONS:  - Identify patients at risk for skin breakdown  - Assess and monitor skin integrity  - Assess and monitor nutrition and hydration status  - Monitor labs   - Assess for incontinence   - Turn and reposition patient  - Assist with mobility/ambulation  - Relieve pressure over bony prominences  - Avoid friction and shearing  - Provide appropriate hygiene as needed including keeping skin clean and dry  - Evaluate need for skin moisturizer/barrier cream  - Collaborate with interdisciplinary team   - Patient/family teaching  - Consider wound care consult   4/25/2024 0227 by Gloria Collazo RN  Outcome: Progressing  4/25/2024  0227 by Gloria Collazo, RN  Outcome: Progressing

## 2024-04-25 NOTE — PLAN OF CARE
Problem: PHYSICAL THERAPY ADULT  Goal: Performs mobility at highest level of function for planned discharge setting.  See evaluation for individualized goals.  Description: Treatment/Interventions: Functional transfer training, LE strengthening/ROM, Elevations, ADL retraining, Therapeutic exercise, Endurance training, Patient/family training, Equipment eval/education, Bed mobility, Gait training, Compensatory technique education, OT          See flowsheet documentation for full assessment, interventions and recommendations.  Outcome: Progressing  Note: Prognosis: Excellent  Problem List: Decreased strength, Decreased endurance, Impaired balance, Decreased range of motion, Decreased mobility, Obesity, Orthopedic restrictions, Decreased skin integrity  Assessment: Pt tolerates tx session fairly well.  She is making progress in terms of utilizing the RW with L knee platform attachment.  She will continue to benefit from skilled PT services to maximize her safety and functional mobility independence.  Barriers to Discharge: Other (Comment)  Barriers to Discharge Comments: NWB status L LE, anxiety regarding mobility  Rehab Resource Intensity Level, PT: I (Maximum Resource Intensity)    See flowsheet documentation for full assessment.

## 2024-04-25 NOTE — PROGRESS NOTES
[facility]  Progress Note  Name: Karly Helton I  MRN: [id number]  Unit/Bed#: -01 I Date of Admission: 4/20/2024   Date of Service: 4/25/2024 I Hospital Day: 5    Assessment/Plan   * Closed fracture of left ankle with routine healing  Assessment & Plan  She missed a step while going down her front porch.she had mechanical fall and fell sideways and felt pain in her left ankle.  Unable to stand afterwards despite help. Xray shows trimalleolar fracture of the left ankle.    Now status post repair by orthopedic surgery  Doing well postoperative with reduced pain  PT/OT - needs rehab  DVT prophylaxis, on Xarelto    Mixed hyperlipidemia  Assessment & Plan  Continue statin therapy    Essential hypertension  Assessment & Plan  Continue atenolol as per home regimen continue pain control    Prothrombin gene mutation (HCC)  Assessment & Plan  History Of DVT in the past.  Known history of prothrombin gene mutation.    Currently on Xarelto 10 mg daily.  Held preoperatively, and now resumed               VTE Pharmacologic Prophylaxis:   High Risk (Score >/= 5) - Pharmacological DVT Prophylaxis Ordered: rivaroxaban (Xarelto). Sequential Compression Devices Ordered.    Mobility:   Basic Mobility Inpatient Raw Score: 17  JH-HLM Goal: 5: Stand one or more mins  JH-HLM Achieved: 7: Walk 25 feet or more  JH-HLM Goal achieved. Continue to encourage appropriate mobility.    Patient Centered Rounds: I performed bedside rounds with nursing staff today.   Discussions with Specialists or Other Care Team Provider: none    Education and Discussions with Family / Patient: Patient declined call to .     Total Time Spent on Date of Encounter in care of patient: 25 mins. This time was spent on one or more of the following: performing physical exam; counseling and coordination of care; obtaining or reviewing history; documenting in the medical record; reviewing/ordering tests, medications or  procedures; communicating with other healthcare professionals and discussing with patient's family/caregivers.    Current Length of Stay: 5 day(s)  Current Patient Status: Inpatient   Certification Statement:  Medically stable, awaiting rehab  Discharge Plan: Anticipate discharge tomorrow to rehab facility.    Code Status: Level 1 - Full Code    Subjective:   Patient overall feels good, pain control, no acute complaints    Objective:     Vitals:   Temp (24hrs), Av.2 °F (36.2 °C), Min:97 °F (36.1 °C), Max:97.3 °F (36.3 °C)    Temp:  [97 °F (36.1 °C)-97.3 °F (36.3 °C)] 97.2 °F (36.2 °C)  HR:  [60-70] 60  Resp:  [17-18] 17  BP: (115-148)/() 115/68  SpO2:  [95 %-99 %] 95 %  Body mass index is 39.6 kg/m².     Input and Output Summary (last 24 hours):     Intake/Output Summary (Last 24 hours) at 2024 1745  Last data filed at 2024 0920  Gross per 24 hour   Intake 360 ml   Output 1700 ml   Net -1340 ml       Physical Exam:   Physical Exam  Vitals and nursing note reviewed.   Constitutional:       General: She is not in acute distress.     Appearance: She is well-developed.   HENT:      Head: Normocephalic and atraumatic.   Eyes:      Conjunctiva/sclera: Conjunctivae normal.   Cardiovascular:      Rate and Rhythm: Normal rate and regular rhythm.      Heart sounds: No murmur heard.  Pulmonary:      Effort: Pulmonary effort is normal. No respiratory distress.      Breath sounds: Normal breath sounds.   Abdominal:      Palpations: Abdomen is soft.      Tenderness: There is no abdominal tenderness.   Musculoskeletal:         General: No swelling.      Cervical back: Neck supple.      Comments: Left lower extremity wrapped   Skin:     General: Skin is warm and dry.      Capillary Refill: Capillary refill takes less than 2 seconds.   Neurological:      Mental Status: She is alert.   Psychiatric:         Mood and Affect: Mood normal.          Additional Data:     Labs:  Results from last 7 days   Lab Units  04/24/24  0512 04/21/24  0510 04/20/24  1530   WBC Thousand/uL 10.54*   < > 8.11   HEMOGLOBIN g/dL 13.1   < > 13.3   HEMATOCRIT % 38.4   < > 40.6   PLATELETS Thousands/uL 169   < > 185   SEGS PCT %  --   --  63   LYMPHO PCT %  --   --  24   MONO PCT %  --   --  10   EOS PCT %  --   --  2    < > = values in this interval not displayed.     Results from last 7 days   Lab Units 04/25/24  0539 04/24/24  0512 04/21/24  0510   SODIUM mmol/L 142   < > 140   POTASSIUM mmol/L 4.1   < > 3.8   CHLORIDE mmol/L 110*   < > 109*   CO2 mmol/L 27   < > 25   BUN mg/dL 24   < > 24   CREATININE mg/dL 0.92   < > 0.94   ANION GAP mmol/L 5   < > 6   CALCIUM mg/dL 9.3   < > 9.5   ALBUMIN g/dL  --   --  3.7   TOTAL BILIRUBIN mg/dL  --   --  0.60   ALK PHOS U/L  --   --  41   ALT U/L  --   --  13   AST U/L  --   --  19   GLUCOSE RANDOM mg/dL 83   < > 101    < > = values in this interval not displayed.     Results from last 7 days   Lab Units 04/21/24  0510   INR  0.98                   Lines/Drains:  Invasive Devices       Peripheral Intravenous Line  Duration             Peripheral IV 04/23/24 Distal;Dorsal (posterior);Right Forearm 2 days              Drain  Duration             External Urinary Catheter 3 days                          Imaging: No pertinent imaging reviewed.    Recent Cultures (last 7 days):         Last 24 Hours Medication List:   Current Facility-Administered Medications   Medication Dose Route Frequency Provider Last Rate    acetaminophen  975 mg Oral Q8H [facility] Foreign Diverio      atenolol  25 mg Oral QAM Foreign Diverio      ezetimibe  10 mg Oral Daily Foreign Diverio      lactated ringers  100 mL/hr Intravenous Continuous Jenaro Cerna CRNA      morphine injection  2 mg Intravenous Q6H PRN Foreign Diverio      ondansetron  4 mg Intravenous Q6H PRN Foreign Diverio      oxyCODONE  10 mg Oral Q6H PRN Foreign Diverio      oxyCODONE  5 mg Oral Q6H PRN Foreign Diverio      pravastatin  20 mg Oral Daily With Dinner Foreign  Zandra      rivaroxaban  10 mg Oral Daily With Breakfast Foreign De Paz          Today, Patient Was Seen By: Jaciel Wing DO    **Please Note: This note may have been constructed using a voice recognition system.**

## 2024-04-25 NOTE — PLAN OF CARE
Problem: PAIN - ADULT  Goal: Verbalizes/displays adequate comfort level or baseline comfort level  Description: Interventions:  - Encourage patient to monitor pain and request assistance  - Assess pain using appropriate pain scale  - Administer analgesics based on type and severity of pain and evaluate response  - Implement non-pharmacological measures as appropriate and evaluate response  - Consider cultural and social influences on pain and pain management  - Notify physician/advanced practitioner if interventions unsuccessful or patient reports new pain  Outcome: Progressing     Problem: INFECTION - ADULT  Goal: Absence or prevention of progression during hospitalization  Description: INTERVENTIONS:  - Assess and monitor for signs and symptoms of infection  - Monitor lab/diagnostic results  - Monitor all insertion sites, i.e. indwelling lines, tubes, and drains  - Monitor endotracheal if appropriate and nasal secretions for changes in amount and color  - [place] appropriate cooling/warming therapies per order  - Administer medications as ordered  - Instruct and encourage patient and family to use good hand hygiene technique  - Identify and instruct in appropriate isolation precautions for identified infection/condition  Outcome: Progressing     Problem: SAFETY ADULT  Goal: Patient will remain free of falls  Description: INTERVENTIONS:  - Educate patient/family on patient safety including physical limitations  - Instruct patient to call for assistance with activity   - Consult OT/PT to assist with strengthening/mobility   - Keep Call bell within reach  - Keep bed low and locked with side rails adjusted as appropriate  - Keep care items and personal belongings within reach  - Initiate and maintain comfort rounds  - Make Fall Risk Sign visible to staff  - Offer Toileting every 2 Hours, in advance of need  - Initiate/Maintain bed alarm  - Obtain necessary fall risk management equipment:     - Apply yellow socks  and bracelet for high fall risk patients  - Consider moving patient to room near nurses station  Outcome: Progressing  Goal: Maintain or return to baseline ADL function  Description: INTERVENTIONS:  -  Assess patient's ability to carry out ADLs; assess patient's baseline for ADL function and identify physical deficits which impact ability to perform ADLs (bathing, care of mouth/teeth, toileting, grooming, dressing, etc.)  - Assess/evaluate cause of self-care deficits   - Assess range of motion  - Assess patient's mobility; develop plan if impaired  - Assess patient's need for assistive devices and provide as appropriate  - Encourage maximum independence but intervene and supervise when necessary  - Involve family in performance of ADLs  - Assess for home care needs following discharge   - Consider OT consult to assist with ADL evaluation and planning for discharge  - Provide patient education as appropriate  Outcome: Progressing  Goal: Maintains/Returns to pre admission functional level  Description: INTERVENTIONS:  - Perform AM-PAC 6 Click Basic Mobility/ Daily Activity assessment daily.  - Set and communicate daily mobility goal to care team and patient/family/caregiver.   - Collaborate with rehabilitation services on mobility goals if consulted  - Perform Range of Motion 3 times a day.  - Reposition patient every 2 hours.  - Dangle patient 3 times a day  - Stand patient 3 times a day  - Ambulate patient 3 times a day  - Out of bed to chair 3 times a day   - Out of bed for meals 3 times a day  - Out of bed for toileting  - Record patient progress and toleration of activity level   Outcome: Progressing     Problem: DISCHARGE PLANNING  Goal: Discharge to home or other facility with appropriate resources  Description: INTERVENTIONS:  - Identify barriers to discharge w/patient and caregiver  - Arrange for needed discharge resources and transportation as appropriate  - Identify discharge learning needs (meds, wound  care, etc.)  - Arrange for interpretive services to assist at discharge as needed  - Refer to Case Management Department for coordinating discharge planning if the patient needs post-hospital services based on physician/advanced practitioner order or complex needs related to functional status, cognitive ability, or social support system  Outcome: Progressing     Problem: Knowledge Deficit  Goal: Patient/family/caregiver demonstrates understanding of disease process, treatment plan, medications, and discharge instructions  Description: Complete learning assessment and assess knowledge base.  Interventions:  - Provide teaching at level of understanding  - Provide teaching via preferred learning methods  Outcome: Progressing     Problem: CARDIOVASCULAR - ADULT  Goal: Maintains optimal cardiac output and hemodynamic stability  Description: INTERVENTIONS:  - Monitor I/O, vital signs and rhythm  - Monitor for S/S and trends of decreased cardiac output  - Administer and titrate ordered vasoactive medications to optimize hemodynamic stability  - Assess quality of pulses, skin color and temperature  - Assess for signs of decreased coronary artery perfusion  - Instruct patient to report change in severity of symptoms  Outcome: Progressing  Goal: Absence of cardiac dysrhythmias or at baseline rhythm  Description: INTERVENTIONS:  - Continuous cardiac monitoring, vital signs, obtain 12 lead EKG if ordered  - Administer antiarrhythmic and heart rate control medications as ordered  - Monitor electrolytes and administer replacement therapy as ordered  Outcome: Progressing     Problem: MUSCULOSKELETAL - ADULT  Goal: Maintain or return mobility to safest level of function  Description: INTERVENTIONS:  - Assess patient's ability to carry out ADLs; assess patient's baseline for ADL function and identify physical deficits which impact ability to perform ADLs (bathing, care of mouth/teeth, toileting, grooming, dressing, etc.)  -  Assess/evaluate cause of self-care deficits   - Assess range of motion  - Assess patient's mobility  - Assess patient's need for assistive devices and provide as appropriate  - Encourage maximum independence but intervene and supervise when necessary  - Involve family in performance of ADLs  - Assess for home care needs following discharge   - Consider OT consult to assist with ADL evaluation and planning for discharge  - Provide patient education as appropriate  Outcome: Progressing  Goal: Maintain proper alignment of affected body part  Description: INTERVENTIONS:  - Support, maintain and protect limb and body alignment  - Provide patient/ family with appropriate education  Outcome: Progressing     Problem: Prexisting or High Potential for Compromised Skin Integrity  Goal: Skin integrity is maintained or improved  Description: INTERVENTIONS:  - Identify patients at risk for skin breakdown  - Assess and monitor skin integrity  - Assess and monitor nutrition and hydration status  - Monitor labs   - Assess for incontinence   - Turn and reposition patient  - Assist with mobility/ambulation  - Relieve pressure over bony prominences  - Avoid friction and shearing  - Provide appropriate hygiene as needed including keeping skin clean and dry  - Evaluate need for skin moisturizer/barrier cream  - Collaborate with interdisciplinary team   - Patient/family teaching  - Consider wound care consult   Outcome: Progressing

## 2024-04-25 NOTE — PROGRESS NOTES
Patient:    MRN:  [id number]    Aidin Request ID:  [id number]    Level of care reserved:  Skilled Nursing Facility    Partner Reserved:  [place] [address] [phone]    Clinical needs requested:    Geography searched:  15 miles around [address]    Start of Service:    Request sent:  2:31pm EDT on 4/23/2024 by Emmie Frederick    Partner reserved:  2:42pm EDT on 4/25/2024 by Emmie Frederick    Choice list shared:  2:30pm EDT on 4/25/2024 by Emmie Frederick

## 2024-04-25 NOTE — CASE MANAGEMENT
Support Center has received INTENT TO DENY for Acute Rehab Authorization.   Insurance: [employer]  Called in by Rep: Joanie  Intent to Deny Reason: Doesn't meet the CM's guidelines  Facility:[facility]   Pending Auth #: [id number]   Peer to Peer Phone#: [phone]      Deadline: 4/26 12:00pm  Appeal P#  [phone]         Care Manager notified: Emmie Frederick

## 2024-04-25 NOTE — CASE MANAGEMENT
Escalation email request was sent to [employer] group to expedite the pt's auth  CM notified Emmie Frederick

## 2024-04-25 NOTE — CASE MANAGEMENT
Case Management Discharge Planning Note    Patient name Karly Helton  Location /-01 MRN [id number]  : 1950 Date 2024       Current Admission Date: 2024  Current Admission Diagnosis:Closed fracture of left ankle with routine healing   Patient Active Problem List    Diagnosis Date Noted    Closed fracture of left ankle with routine healing 2024    Prothrombin gene mutation (HCC) 2024    Essential hypertension 2024    Mixed hyperlipidemia 2024      LOS (days): 5  Geometric Mean LOS (GMLOS) (days): 4.2  Days to GMLOS:-0.6     OBJECTIVE:  Risk of Unplanned Readmission Score: 9.32         Current admission status: Inpatient   Preferred Pharmacy:   [facility]/pharmacy #[id number] [place], [place] - [address] [address]  Phone: [phone] Fax: [phone]    Primary Care Provider: No primary care provider on file.    Primary Insurance: [employer] RE[employer]  Secondary Insurance:     DISCHARGE DETAILS:       Other Referral/Resources/Interventions Provided:  Interventions: Acute Rehab  Referral Comments: [facility] [place] - auth pnding    Would you like to participate in our [facility] service program?  : No - Declined    Treatment Team Recommendation: Acute Rehab  Discharge Destination Plan:: Acute Rehab   IMM Given (Date):: 24  IMM Given to:: Patient        CM reviewed IMM with pt; No questions or concerns. Pt in agreement with rights, and is aware of the right to appeal d/c once medically stable if desired. IMM signed.   CM reviewed pending auth for acute with [facility] and if for some reason LOC of care not approved by insurance that STR would be recommended and choice is [facility]. CM to keep patient updated as information know.     CM to follow patient's care and discharge needs.    ok

## 2024-04-25 NOTE — DISCHARGE INSTR - AVS FIRST PAGE
Dressings and splint are to remain clean, dry and intact.  These are not to be removed.    Follow-up with Dr. De Paz in approximately 1 week, 7 to 10 days postop.    Nonweightbearing left lower extremity.    May apply ice to the ankle for 20 to 30 minutes multiple times throughout the day.    Elevate the ankle above heart level for swelling and edema control.    Work on wiggling the toes throughout the day.    If orthopedic questions or concerns arise contact Dr. De Paz.

## 2024-04-25 NOTE — PROGRESS NOTES
Progress Note - Orthopedics   Karly Helton 74 y.o. female MRN: [id number]  Unit/Bed#: -01 Encounter: [id number]    Assessment:  POD #2 ORIF left ankle; ambulatory dysfunction; hypertension    Plan:  Continue current treatment plan with discharge planning.  Patient anticipates discharge to rehab although insurance approval is pending.  If discharged, follow-up in 1 week.  Continue nonweightbearing left lower extremity.  Continue dressings and splint without being removed.  Continue current DVT prophylaxis with Xarelto.    Weight bearing: Nonweightbearing left lower extremity    VTE Pharmacologic Prophylaxis: Xarelto  VTE Mechanical Prophylaxis: sequential compression device    Subjective: Karly reports minimal, if any pain.  She notes some slight pain along the medial ankle.  She denies lower extremity paresthesias.  She denies calf pain.  She denies lightheadedness, shortness of breath, chest pain or difficulty breathing.    Vitals: Blood pressure 148/100, pulse 70, temperature (!) 97.3 °F (36.3 °C), resp. rate 18, weight 110 kg (241 lb 10 oz), SpO2 98%.,Body mass index is 39.6 kg/m².      Intake/Output Summary (Last 24 hours) at 4/25/2024 0740  Last data filed at 4/25/2024 0652  Gross per 24 hour   Intake 960 ml   Output 1800 ml   Net -840 ml       Invasive Devices       Peripheral Intravenous Line  Duration             Peripheral IV 04/23/24 Distal;Dorsal (posterior);Right Forearm 1 day              Drain  Duration             External Urinary Catheter 3 days                    Physical Exam: The dressings and splint are clean, dry and intact.  The toes demonstrate good color and capillary refill and she is actively able to move the toes.  Calf compartments are soft and nontender and compressible.  She is able to independently straight leg raise.    Physical therapy notes were reviewed as well as the patient's medical notes.

## 2024-04-26 VITALS
TEMPERATURE: 97.5 F | HEART RATE: 61 BPM | SYSTOLIC BLOOD PRESSURE: 132 MMHG | DIASTOLIC BLOOD PRESSURE: 71 MMHG | WEIGHT: 241.62 LBS | RESPIRATION RATE: 17 BRPM | BODY MASS INDEX: 39.6 KG/M2 | OXYGEN SATURATION: 94 %

## 2024-04-26 LAB
ANION GAP SERPL CALCULATED.3IONS-SCNC: 5 MMOL/L (ref 4–13)
BUN SERPL-MCNC: 23 MG/DL (ref 5–25)
CALCIUM SERPL-MCNC: 9.4 MG/DL (ref 8.4–10.2)
CHLORIDE SERPL-SCNC: 108 MMOL/L (ref 96–108)
CO2 SERPL-SCNC: 27 MMOL/L (ref 21–32)
CREAT SERPL-MCNC: 0.92 MG/DL (ref 0.6–1.3)
GFR SERPL CREATININE-BSD FRML MDRD: 61 ML/MIN/1.73SQ M
GLUCOSE SERPL-MCNC: 87 MG/DL (ref 65–140)
POTASSIUM SERPL-SCNC: 4.1 MMOL/L (ref 3.5–5.3)
SODIUM SERPL-SCNC: 140 MMOL/L (ref 135–147)

## 2024-04-26 PROCEDURE — 80048 BASIC METABOLIC PNL TOTAL CA: CPT | Performed by: ORTHOPAEDIC SURGERY

## 2024-04-26 PROCEDURE — 99239 HOSP IP/OBS DSCHRG MGMT >30: CPT | Performed by: HOSPITALIST

## 2024-04-26 PROCEDURE — 99024 POSTOP FOLLOW-UP VISIT: CPT | Performed by: PHYSICIAN ASSISTANT

## 2024-04-26 RX ORDER — OXYCODONE HYDROCHLORIDE 5 MG/1
5 TABLET ORAL EVERY 6 HOURS PRN
Start: 2024-04-26 | End: 2024-05-06

## 2024-04-26 RX ORDER — ACETAMINOPHEN 325 MG/1
975 TABLET ORAL EVERY 8 HOURS SCHEDULED
Start: 2024-04-26 | End: 2024-05-10

## 2024-04-26 RX ADMIN — ATENOLOL 25 MG: 25 TABLET ORAL at 07:55

## 2024-04-26 RX ADMIN — EZETIMIBE 10 MG: 10 TABLET ORAL at 07:55

## 2024-04-26 RX ADMIN — ACETAMINOPHEN 325MG 975 MG: 325 TABLET ORAL at 07:55

## 2024-04-26 RX ADMIN — RIVAROXABAN 10 MG: 10 TABLET, FILM COATED ORAL at 07:55

## 2024-04-26 NOTE — PROGRESS NOTES
"Orthopedics   Karly Helton 74 y.o. female MRN: [id number]  Unit/Bed#: -01    Subjective:  74 y.o.female post operative day 3 status post left Ankle ORIF. Pt doing well. Pain controlled.  Denies numbness or tingling.  She has been doing well with elevation.  She is awaiting placement for rehab.  Denies chest pain, abdominal pain, or shortness of breath.    Labs:  0   Lab Value Date/Time    HCT 38.4 04/24/2024 0512    HCT 38.8 04/21/2024 0510    HCT 40.6 04/20/2024 1530    HGB 13.1 04/24/2024 0512    HGB 12.9 04/21/2024 0510    HGB 13.3 04/20/2024 1530    INR 0.98 04/21/2024 0510    WBC 10.54 (H) 04/24/2024 0512    WBC 8.51 04/21/2024 0510    WBC 8.11 04/20/2024 1530    CRP <0.40 01/24/2024 0626     Meds:    Current Facility-Administered Medications:     acetaminophen (TYLENOL) tablet 975 mg, 975 mg, Oral, Q8H ANGELI, Foreign Diverio, 975 mg at 04/25/24 2341    atenolol (TENORMIN) tablet 25 mg, 25 mg, Oral, QAM, Foreign Diverio, 25 mg at 04/25/24 0814    ezetimibe (ZETIA) tablet 10 mg, 10 mg, Oral, Daily, Foreign Diverio, 10 mg at 04/25/24 0814    lactated ringers infusion, 100 mL/hr, Intravenous, Continuous, Jenaro Cerna CRNA    morphine injection 2 mg, 2 mg, Intravenous, Q6H PRN, Foreign Divergonzalez    ondansetron (ZOFRAN) injection 4 mg, 4 mg, Intravenous, Q6H PRN, Foreign Diverio, 4 mg at 04/20/24 7419    oxyCODONE (ROXICODONE) immediate release tablet 10 mg, 10 mg, Oral, Q6H PRN, Foreign Divergonzalez    oxyCODONE (ROXICODONE) IR tablet 5 mg, 5 mg, Oral, Q6H PRN, Foreign Diverio    pravastatin (PRAVACHOL) tablet 20 mg, 20 mg, Oral, Daily With Dinner, Foreign Diverio, 20 mg at 04/25/24 1606    rivaroxaban (XARELTO) tablet 10 mg, 10 mg, Oral, Daily With Breakfast, Foreign Diverio, 10 mg at 04/25/24 0814    Blood Culture:   No results found for: \"BLOODCX\"    Wound Culture:   No results found for: \"WOUNDCULT\"    Ins and Outs:  I/O last 24 hours:  In: -   Out: 1700 [Urine:1700]    Physical:  Vitals:    04/26/24 0710   BP: " 132/71   Pulse: 61   Resp: 17   Temp: 97.5 °F (36.4 °C)   SpO2: 94%     left lower extremity ORIF  Dressings clean dry Intact with U-splint in place.  4/5 strength to EHL/FHL  Gross range of motion of the toes is intact.  Toes are warm to touch.  There is no calf pain.  Sensation intact L2-S1  2+ DP Pulse    Assessment:   Post operative day 3 status post left Ankle ORIF. Doing well and awaiting placement for discharge.    Plan:  Nonweight bearing left lower extremity  Up out of bed  Ice, Elevation to affected extremity  Analgesics as needed  DVT prophylaxis with Xarelto  Dispo: Ok for discharge from ortho perspective  Will continue to assess for acute blood loss anemia    Fadi Rios PA-C

## 2024-04-26 NOTE — PLAN OF CARE
Problem: PAIN - ADULT  Goal: Verbalizes/displays adequate comfort level or baseline comfort level  Description: Interventions:  - Encourage patient to monitor pain and request assistance  - Assess pain using appropriate pain scale  - Administer analgesics based on type and severity of pain and evaluate response  - Implement non-pharmacological measures as appropriate and evaluate response  - Consider cultural and social influences on pain and pain management  - Notify physician/advanced practitioner if interventions unsuccessful or patient reports new pain  4/25/2024 2104 by Yolanda Weinstein LPN  Outcome: Progressing  4/25/2024 0956 by Yolanda Weinstein LPN  Outcome: Progressing     Problem: INFECTION - ADULT  Goal: Absence or prevention of progression during hospitalization  Description: INTERVENTIONS:  - Assess and monitor for signs and symptoms of infection  - Monitor lab/diagnostic results  - Monitor all insertion sites, i.e. indwelling lines, tubes, and drains  - Monitor endotracheal if appropriate and nasal secretions for changes in amount and color  - [place] appropriate cooling/warming therapies per order  - Administer medications as ordered  - Instruct and encourage patient and family to use good hand hygiene technique  - Identify and instruct in appropriate isolation precautions for identified infection/condition  4/25/2024 2104 by Yolanda Weinstein LPN  Outcome: Progressing  4/25/2024 0956 by Yolanda Weinstein LPN  Outcome: Progressing     Problem: SAFETY ADULT  Goal: Patient will remain free of falls  Description: INTERVENTIONS:  - Educate patient/family on patient safety including physical limitations  - Instruct patient to call for assistance with activity   - Consult OT/PT to assist with strengthening/mobility   - Keep Call bell within reach  - Keep bed low and locked with side rails adjusted as appropriate  - Keep care items and personal belongings within reach  - Initiate and maintain comfort  rounds  - Make Fall Risk Sign visible to staff  - Offer Toileting every 2 Hours, in advance of need  - Initiate/Maintain   alarm  - Obtain necessary fall risk management equipment:     - Apply yellow socks and bracelet for high fall risk patients  - Consider moving patient to room near nurses station  4/25/2024 2104 by Yolanda Weinstein LPN  Outcome: Progressing  4/25/2024 0956 by Yolanda Weinstein LPN  Outcome: Progressing  Goal: Maintain or return to baseline ADL function  Description: INTERVENTIONS:  -  Assess patient's ability to carry out ADLs; assess patient's baseline for ADL function and identify physical deficits which impact ability to perform ADLs (bathing, care of mouth/teeth, toileting, grooming, dressing, etc.)  - Assess/evaluate cause of self-care deficits   - Assess range of motion  - Assess patient's mobility; develop plan if impaired  - Assess patient's need for assistive devices and provide as appropriate  - Encourage maximum independence but intervene and supervise when necessary  - Involve family in performance of ADLs  - Assess for home care needs following discharge   - Consider OT consult to assist with ADL evaluation and planning for discharge  - Provide patient education as appropriate  4/25/2024 2104 by Yolanda Weinstein LPN  Outcome: Progressing  4/25/2024 0956 by Yolanda Weinstein LPN  Outcome: Progressing  Goal: Maintains/Returns to pre admission functional level  Description: INTERVENTIONS:  - Perform AM-PAC 6 Click Basic Mobility/ Daily Activity assessment daily.  - Set and communicate daily mobility goal to care team and patient/family/caregiver.   - Collaborate with rehabilitation services on mobility goals if consulted  - Perform Range of Motion   3 times a day.  - Reposition patient every 2 hours.  - Dangle patient 3 times a day  - Stand patient 3 times a day  - Ambulate patient 3 times a day  - Out of bed to chair 3 times a day   - Out of bed for meals 3 times a day  - Out of bed for  toileting  - Record patient progress and toleration of activity level   4/25/2024 2104 by Yolanda Weinstein LPN  Outcome: Progressing  4/25/2024 0956 by Yolanda Weinstein LPN  Outcome: Progressing     Problem: DISCHARGE PLANNING  Goal: Discharge to home or other facility with appropriate resources  Description: INTERVENTIONS:  - Identify barriers to discharge w/patient and caregiver  - Arrange for needed discharge resources and transportation as appropriate  - Identify discharge learning needs (meds, wound care, etc.)  - Arrange for interpretive services to assist at discharge as needed  - Refer to Case Management Department for coordinating discharge planning if the patient needs post-hospital services based on physician/advanced practitioner order or complex needs related to functional status, cognitive ability, or social support system  4/25/2024 2104 by Yolanda Weinstein LPN  Outcome: Progressing  4/25/2024 0956 by Yolanda Weinstein LPN  Outcome: Progressing     Problem: Knowledge Deficit  Goal: Patient/family/caregiver demonstrates understanding of disease process, treatment plan, medications, and discharge instructions  Description: Complete learning assessment and assess knowledge base.  Interventions:  - Provide teaching at level of understanding  - Provide teaching via preferred learning methods  4/25/2024 2104 by Yolanda Weinstein LPN  Outcome: Progressing  4/25/2024 0956 by Yolanda Weinstein LPN  Outcome: Progressing     Problem: CARDIOVASCULAR - ADULT  Goal: Maintains optimal cardiac output and hemodynamic stability  Description: INTERVENTIONS:  - Monitor I/O, vital signs and rhythm  - Monitor for S/S and trends of decreased cardiac output  - Administer and titrate ordered vasoactive medications to optimize hemodynamic stability  - Assess quality of pulses, skin color and temperature  - Assess for signs of decreased coronary artery perfusion  - Instruct patient to report change in severity of  symptoms  4/25/2024 2104 by Yolanda Weinstein LPN  Outcome: Progressing  4/25/2024 0956 by Yolanda Weinstein LPN  Outcome: Progressing  Goal: Absence of cardiac dysrhythmias or at baseline rhythm  Description: INTERVENTIONS:  - Continuous cardiac monitoring, vital signs, obtain 12 lead EKG if ordered  - Administer antiarrhythmic and heart rate control medications as ordered  - Monitor electrolytes and administer replacement therapy as ordered  4/25/2024 2104 by Yolanda Weinstein LPN  Outcome: Progressing  4/25/2024 0956 by Yolanda Weinstein LPN  Outcome: Progressing     Problem: MUSCULOSKELETAL - ADULT  Goal: Maintain or return mobility to safest level of function  Description: INTERVENTIONS:  - Assess patient's ability to carry out ADLs; assess patient's baseline for ADL function and identify physical deficits which impact ability to perform ADLs (bathing, care of mouth/teeth, toileting, grooming, dressing, etc.)  - Assess/evaluate cause of self-care deficits   - Assess range of motion  - Assess patient's mobility  - Assess patient's need for assistive devices and provide as appropriate  - Encourage maximum independence but intervene and supervise when necessary  - Involve family in performance of ADLs  - Assess for home care needs following discharge   - Consider OT consult to assist with ADL evaluation and planning for discharge  - Provide patient education as appropriate  4/25/2024 2104 by Yolanda Weinstein LPN  Outcome: Progressing  4/25/2024 0956 by Yolanda Weinstein LPN  Outcome: Progressing  Goal: Maintain proper alignment of affected body part  Description: INTERVENTIONS:  - Support, maintain and protect limb and body alignment  - Provide patient/ family with appropriate education  4/25/2024 2104 by Yolanda Weinstein LPN  Outcome: Progressing  4/25/2024 0956 by Yolanda Weinstein LPN  Outcome: Progressing     Problem: Prexisting or High Potential for Compromised Skin Integrity  Goal: Skin integrity is maintained or  improved  Description: INTERVENTIONS:  - Identify patients at risk for skin breakdown  - Assess and monitor skin integrity  - Assess and monitor nutrition and hydration status  - Monitor labs   - Assess for incontinence   - Turn and reposition patient  - Assist with mobility/ambulation  - Relieve pressure over bony prominences  - Avoid friction and shearing  - Provide appropriate hygiene as needed including keeping skin clean and dry  - Evaluate need for skin moisturizer/barrier cream  - Collaborate with interdisciplinary team   - Patient/family teaching  - Consider wound care consult   4/25/2024 2104 by Yolanda Weinstein LPN  Outcome: Progressing  4/25/2024 0956 by Yolanda Weinstein LPN  Outcome: Progressing

## 2024-04-26 NOTE — ASSESSMENT & PLAN NOTE
She missed a step while going down her front porch.she had mechanical fall and fell sideways and felt pain in her left ankle.  Unable to stand afterwards despite help. Xray shows trimalleolar fracture of the left ankle.    Now status post repair by orthopedic surgery  Doing well postoperative with reduced pain  PT/OT - needs rehab  Non weight bearing LLE  Oxycodone PRN pain  DVT prophylaxis, on Xarelto

## 2024-04-26 NOTE — DISCHARGE SUMMARY
[facility]  Discharge- Karly Helton 1950, 74 y.o. female MRN: [id number]  Unit/Bed#: -01 Encounter: [id number]  Primary Care Provider: No primary care provider on file.   Date and time admitted to hospital: 4/20/2024  2:21 PM    * Closed fracture of left ankle with routine healing  Assessment & Plan  She missed a step while going down her front porch.she had mechanical fall and fell sideways and felt pain in her left ankle.  Unable to stand afterwards despite help. Xray shows trimalleolar fracture of the left ankle.    Now status post repair by orthopedic surgery  Doing well postoperative with reduced pain  PT/OT - needs rehab  Non weight bearing LLE  Oxycodone PRN pain  DVT prophylaxis, on Xarelto    Mixed hyperlipidemia  Assessment & Plan  Continue statin therapy    Essential hypertension  Assessment & Plan  Continue atenolol as per home regimen continue pain control    Prothrombin gene mutation (HCC)  Assessment & Plan  History Of DVT in the past.  Known history of prothrombin gene mutation.    Currently on Xarelto 10 mg daily.  Held preoperatively, and now resumed        Medical Problems       Resolved Problems  Date Reviewed: 4/26/2024   None       Discharging Physician / Practitioner: Jaciel Wing DO  PCP: No primary care provider on file.  Admission Date:   Admission Orders (From admission, onward)       Ordered        04/20/24 1605  INPATIENT ADMISSION  Once                          Discharge Date: 04/26/24    Consultations During Hospital Stay:  Orthopaedics    Procedures Performed:    left Ankle ORIF     Significant Findings / Test Results:   XR ankle 3+ vw left   Final Result by Raul Castillo MD (04/24 8030)      Fluoroscopy provided for procedure guidance.      Please refer to the separate procedure note for additional details.                  Workstation performed: [id number]         XR ankle 3+ views LEFT   ED Interpretation by John Paul SANCHEZ  DO Cristina (04/20 1602)   Trimalleolar fracture      Final Result by Tom Noble MD (04/21 6446)   Trimalleolar fracture dislocation.      Findings concur with ED results as provided in PACS viewer/EPIC at the time of interpretation.            Workstation performed: [id number]           Lab Results   Component Value Date    WBC 10.54 (H) 04/24/2024    HGB 13.1 04/24/2024    HCT 38.4 04/24/2024    MCV 92 04/24/2024     04/24/2024     Lab Results   Component Value Date    SODIUM 140 04/26/2024    K 4.1 04/26/2024     04/26/2024    CO2 27 04/26/2024    BUN 23 04/26/2024    CREATININE 0.92 04/26/2024    GLUC 87 04/26/2024    CALCIUM 9.4 04/26/2024         Incidental Findings:   See above   I reviewed the above mentioned incidental findings with the patient and/or family and they expressed understanding.    Test Results Pending at Discharge (will require follow up):   none     Outpatient Tests Requested:  none    Complications:  none    Reason for Admission: ankle fracture    Hospital Course:   Karly Helton is a 74 y.o. female patient who originally presented to the hospital on 4/20/2024 due to left ankle fracture.  Patient was seen by orthopedic surgery and went for ORIF of the left ankle on 4/23.  Patient did well postoperatively, with overall controlled pain.  She was seen by physical therapy and recommended for rehab.  Patient medically stable for discharge      Please see above list of diagnoses and related plan for additional information.     Condition at Discharge: good    Discharge Day Visit / Exam:   Subjective: Patient has no acute complaints  Vitals: Blood Pressure: 132/71 (04/26/24 0710)  Pulse: 61 (04/26/24 0710)  Temperature: 97.5 °F (36.4 °C) (04/26/24 0710)  Temp Source: Temporal (04/25/24 2211)  Respirations: 17 (04/26/24 0710)  Weight - Scale: 110 kg (241 lb 10 oz) (04/20/24 1425)  SpO2: 94 % (04/26/24 0710)  Exam:   Physical Exam  Vitals and nursing note reviewed.   Constitutional:        General: She is not in acute distress.     Appearance: She is well-developed.   HENT:      Head: Normocephalic and atraumatic.   Eyes:      Conjunctiva/sclera: Conjunctivae normal.   Cardiovascular:      Rate and Rhythm: Normal rate and regular rhythm.      Heart sounds: No murmur heard.  Pulmonary:      Effort: Pulmonary effort is normal. No respiratory distress.      Breath sounds: Normal breath sounds.   Abdominal:      Palpations: Abdomen is soft.      Tenderness: There is no abdominal tenderness.   Musculoskeletal:         General: No swelling.      Cervical back: Neck supple.      Comments: Left lower extremity wrapped   Skin:     General: Skin is warm and dry.      Capillary Refill: Capillary refill takes less than 2 seconds.   Neurological:      Mental Status: She is alert.   Psychiatric:         Mood and Affect: Mood normal.          Discussion with Family: Updated  () at bedside.    Discharge instructions/Information to patient and family:   See after visit summary for information provided to patient and family.      Provisions for Follow-Up Care:  See after visit summary for information related to follow-up care and any pertinent home health orders.      Mobility at time of Discharge:   Basic Mobility Inpatient Raw Score: 17  -HLM Goal: 5: Stand one or more mins  JH-HLM Achieved: 2: Bed activities/Dependent transfer (Pt did not want to get OOB)  HLM Goal NOT achieved. Continue to encourage mobility in post discharge setting.     Disposition:   Other Skilled Nursing Facility at [facility]    Planned Readmission: no     Discharge Statement:  I spent 32 minutes discharging the patient. This time was spent on the day of discharge. I had direct contact with the patient on the day of discharge. Greater than 50% of the total time was spent examining patient, answering all patient questions, arranging and discussing plan of care with patient as well as directly providing post-discharge  instructions.  Additional time then spent on discharge activities.    Discharge Medications:  See after visit summary for reconciled discharge medications provided to patient and/or family.      **Please Note: This note may have been constructed using a voice recognition system**

## 2024-04-26 NOTE — CASE MANAGEMENT
Support Center has received DENIAL for SNF Authorization.   Insurance: [employer]  Called in by Rep: Randi Garcia Reason:  the pt doesn't need for SNF level care  Facility: [facility]   Denial #: [id number]   Peer to Peer Phone#:   [phone]    Deadline: 04/29 12:00  Appeal P#  [phone]  Care Manager notified: Emmie Frederick

## 2024-04-26 NOTE — CASE MANAGEMENT
Support Center has received DENIAL for Acute Rehab Authorization.   Insurance: [employer]   Denial Reason:  Doesn't meet the CM's guidelines   Facility: [facility]    Denial #: [id number]      Care Manager notified: Emmie Frederick

## 2024-04-29 NOTE — UTILIZATION REVIEW
NOTIFICATION OF ADMISSION DISCHARGE   This is a Notification of Discharge from [facility]. Please be advised that this patient has been discharge from our facility. Below you will find the admission and discharge date and time including the patient’s disposition.   UTILIZATION REVIEW CONTACT:  Joy Martinez  Utilization   Network Utilization Review Department  Phone: [phone] x carefully listen to the prompts. All voicemails are confidential.  Email: [email or web address]     ADMISSION INFORMATION  PRESENTATION DATE: 4/20/2024  2:21 PM  OBERVATION ADMISSION DATE:   INPATIENT ADMISSION DATE: 4/20/24  4:05 PM   DISCHARGE DATE: 4/26/2024  1:09 PM   DISPOSITION:Non [facility] SNF/TCU/SNU    Network Utilization Review Department  ATTENTION: Please call with any questions or concerns to [phone] and carefully listen to the prompts so that you are directed to the right person. All voicemails are confidential.   For Discharge needs, contact Care Management DC Support Team at [phone] opt. 2  Send all requests for admission clinical reviews, approved or denied determinations and any other requests to dedicated fax number below belonging to the campus where the patient is receiving treatment. List of dedicated fax numbers for the Facilities:  FACILITY NAME UR FAX NUMBER   ADMISSION DENIALS (Administrative/Medical Necessity) [phone]   DISCHARGE SUPPORT TEAM ([facility]) [phone]   PARENT CHILD HEALTH (Maternity/NICU/Pediatrics) [phone]   [facility] [phone]   [facility] [phone]   [facility] [phone]   [facility] [phone]   [facility] [phone]   [facility] [phone]   [facility] [phone]   [facility]  [place] [phone]   [facility] [phone]   [facility] [phone]   [facility] [phone]   [facility] [phone]

## 2024-04-30 ENCOUNTER — TELEPHONE (OUTPATIENT)
Age: 74
End: 2024-04-30

## 2024-04-30 RX ORDER — EVOLOCUMAB 140 MG/ML
1 INJECTION, SOLUTION SUBCUTANEOUS
COMMUNITY
Start: 2024-03-04

## 2024-04-30 NOTE — TELEPHONE ENCOUNTER
Caller: Daughter-Jen    Doctor: Dr. De Paz    Reason for call: Daughter calling to schedule 7-10 post op for from Left ankle ORIF on 4/23/24.  She is off this Friday.  Scheduled in NP slot and email sent to supervisor to change to post op.    Call back#: [phone]

## 2024-05-03 ENCOUNTER — OFFICE VISIT (OUTPATIENT)
Dept: OBGYN CLINIC | Facility: CLINIC | Age: 74
End: 2024-05-03

## 2024-05-03 VITALS
OXYGEN SATURATION: 98 % | HEIGHT: 65 IN | HEART RATE: 85 BPM | WEIGHT: 241 LBS | TEMPERATURE: 97.4 F | SYSTOLIC BLOOD PRESSURE: 130 MMHG | BODY MASS INDEX: 40.15 KG/M2 | DIASTOLIC BLOOD PRESSURE: 90 MMHG

## 2024-05-03 DIAGNOSIS — S82.852A CLOSED TRIMALLEOLAR FRACTURE OF LEFT ANKLE, INITIAL ENCOUNTER: Primary | ICD-10-CM

## 2024-05-03 PROCEDURE — 99024 POSTOP FOLLOW-UP VISIT: CPT | Performed by: ORTHOPAEDIC SURGERY

## 2024-05-03 NOTE — PATIENT INSTRUCTIONS
Will be seen back in 1 week and undergo staple removal for evaluation of her fracture blister areas with consideration of going into a cam boot or cast.  She is to continue nonweightbearing on the left lower extremity.  She may continue icing elevating and pain meds as needed.  She will have x-rays of the left ankle at her appointment next week.

## 2024-05-03 NOTE — PROGRESS NOTES
"Patient Name:  Karly Helton  MRN:  [id number]    Assessment     1. Closed trimalleolar fracture of left ankle, initial encounter          Plan     Will be seen back in 1 week and undergo staple removal for evaluation of her fracture blister areas with consideration of going into a cam boot or cast.  She is to continue nonweightbearing on the left lower extremity.  She may continue icing elevating and pain meds as needed.  She will have x-rays of the left ankle at her appointment next week.    Return in about 1 week (around 5/10/2024) for xray left ankle.    Subjective   Karly Helton returns for follow-up of left ankle ORIF needle and lateral malleolus.  The patient is 10 day(s) post op and returns for routine follow-up. Patient complains of some mild discomfort about the ankle.  She denies any calf pain chest pain or shortness of breath.  Denies any numbness or tingling.    Objective     /90 (BP Location: Left arm)   Pulse 85   Temp (!) 97.4 °F (36.3 °C)   Ht 5' 5\" (1.651 m)   Wt 109 kg (241 lb)   SpO2 98%   BMI 40.10 kg/m²     Left ankle to use splint removed to evaluate the wound and blister areas.  Both medial and lateral wounds are healing nicely with staples in place.  There is faint ecchymosis more on the [place] shin area than the ankle or foot area consistent with elevation of the extremity.  There is minimal swelling in the foot.  The medial fracture blister had opened with peeling of the superficial layer but no obvious infection.  The anterior blister spontaneously erupted.  The medial fracture blister was still intact.  This was aspirated under sterile technique today.  Visions and fracture blister areas were covered with Xeroform gauze and sterile 4 x 4's cast padding was then applied patient was placed back into her previous splint and was noted to be comfortable.  Calf pain negative Homans' sign.  Light touch sensation is intact.  Gross range of motion of toes and ankle with slight " plantar and dorsiflexion are intact.    Data Review     Intra operative x-rays were reviewed with the patient and her daughter.      Fadi Rios PA-C

## 2024-05-10 ENCOUNTER — OFFICE VISIT (OUTPATIENT)
Dept: OBGYN CLINIC | Facility: CLINIC | Age: 74
End: 2024-05-10

## 2024-05-10 ENCOUNTER — HOSPITAL ENCOUNTER (OUTPATIENT)
Dept: RADIOLOGY | Facility: CLINIC | Age: 74
End: 2024-05-10
Payer: COMMERCIAL

## 2024-05-10 VITALS
WEIGHT: 241 LBS | TEMPERATURE: 98.7 F | SYSTOLIC BLOOD PRESSURE: 155 MMHG | HEIGHT: 65 IN | DIASTOLIC BLOOD PRESSURE: 98 MMHG | BODY MASS INDEX: 40.15 KG/M2 | HEART RATE: 68 BPM | OXYGEN SATURATION: 97 %

## 2024-05-10 DIAGNOSIS — S82.852D CLOSED TRIMALLEOLAR FRACTURE OF LEFT ANKLE WITH ROUTINE HEALING, SUBSEQUENT ENCOUNTER: Primary | ICD-10-CM

## 2024-05-10 DIAGNOSIS — S82.852D CLOSED TRIMALLEOLAR FRACTURE OF LEFT ANKLE WITH ROUTINE HEALING, SUBSEQUENT ENCOUNTER: ICD-10-CM

## 2024-05-10 PROCEDURE — 73610 X-RAY EXAM OF ANKLE: CPT

## 2024-05-10 PROCEDURE — 99024 POSTOP FOLLOW-UP VISIT: CPT | Performed by: ORTHOPAEDIC SURGERY

## 2024-05-10 NOTE — PROGRESS NOTES
"Patient Name:  Karly Helton  MRN:  [id number]    Assessment     1. Closed trimalleolar fracture of left ankle with routine healing, subsequent encounter  XR ankle 3+ vw left    Cam Boot          Plan     I would recommend follow-up in 3 weeks.  She was placed into a high tide Cam boot which should be worn full-time although may be removed for cleansing with precautions as discussed.  I would permit her to bear some weight for transfers but otherwise she should tend to avoid any prolonged weightbearing on the left lower extremity.  She may work a little bit on gentle range of motion especially while cleansing.  The boot could be removed for periods of inactivity if she chooses.  I have encouraged her to contact me if questions or concerns arise.    Return in about 3 weeks (around 5/31/2024).      Subjective   Karly Helton returns for follow-up of her ankle fracture.  The patient is 2 week(s) post ORIF and returns for routine follow-up. Patient denies significant pain.  She denies paresthesias.      Objective     /98 (BP Location: Left arm)   Pulse 68   Temp 98.7 °F (37.1 °C)   Ht 5' 5\" (1.651 m)   Wt 109 kg (241 lb)   SpO2 97%   BMI 40.10 kg/m²     Exam demonstrated the incisions to be healed well and staples were removed.  She is able to dorsiflex a few degrees, plantar flex about 20 degrees, demonstrates good toe flexion and extension.  Minimal inversion and eversion was noted.  Sensation is intact.  Swelling is present consistent with her early postoperative course.  Calf compartments are soft, nontender and Homans' sign is negative.      Data Review     I have personally reviewed pertinent films in PACS demonstrating the fracture to remain stably aligned with intact fixation.    Foreign De Paz    "

## 2024-05-31 ENCOUNTER — OFFICE VISIT (OUTPATIENT)
Dept: OBGYN CLINIC | Facility: CLINIC | Age: 74
End: 2024-05-31

## 2024-05-31 ENCOUNTER — HOSPITAL ENCOUNTER (OUTPATIENT)
Dept: RADIOLOGY | Facility: CLINIC | Age: 74
End: 2024-05-31
Payer: COMMERCIAL

## 2024-05-31 VITALS
BODY MASS INDEX: 40.15 KG/M2 | HEART RATE: 76 BPM | TEMPERATURE: 98.6 F | DIASTOLIC BLOOD PRESSURE: 80 MMHG | HEIGHT: 65 IN | WEIGHT: 241 LBS | SYSTOLIC BLOOD PRESSURE: 125 MMHG

## 2024-05-31 DIAGNOSIS — S82.852D CLOSED TRIMALLEOLAR FRACTURE OF LEFT ANKLE WITH ROUTINE HEALING, SUBSEQUENT ENCOUNTER: ICD-10-CM

## 2024-05-31 DIAGNOSIS — S82.852D CLOSED TRIMALLEOLAR FRACTURE OF LEFT ANKLE WITH ROUTINE HEALING, SUBSEQUENT ENCOUNTER: Primary | ICD-10-CM

## 2024-05-31 PROCEDURE — 99024 POSTOP FOLLOW-UP VISIT: CPT | Performed by: ORTHOPAEDIC SURGERY

## 2024-05-31 PROCEDURE — 73610 X-RAY EXAM OF ANKLE: CPT

## 2024-05-31 NOTE — PATIENT INSTRUCTIONS
Patient will be placed in a lace up ankle brace allowed to weight-bear as tolerated.  Offload pressure on the lateral blister area.  PT to progress range of motion and start gentle strengthening.  Return to orthopedic office for repeat evaluation in 4 weeks with final x-rays.  Use a donut pad or otologic pad for medial former fracture blister area.

## 2024-05-31 NOTE — PROGRESS NOTES
"Patient Name:  Karly Helton  MRN:  [id number]    Assessment     1. Closed trimalleolar fracture of left ankle with routine healing, subsequent encounter  XR ankle 3+ vw left        Plan     Patient will be placed in a lace up ankle brace allowed to weight-bear as tolerated.  Offload pressure on the lateral blister area.  PT to progress range of motion and start gentle strengthening.  Return to orthopedic office for repeat evaluation in 4 weeks with final x-rays.  Use a donut pad or otologic pad for medial former fracture blister area.    Return in about 4 weeks (around 6/28/2024) for X-ray left ankle, Recheck.    Subjective   Karly Helton returns for follow-up of left ankle ORIF, DOS 4/23/2024.  The patient is 5 week(s) post op and returns for routine follow-up. Patient denies complaints of pain about the ankle.  She has been diligent in wearing her boot.  She does note that she had a small blistered area from fractures on the lateral ankle.  Denies gross pain or discharge or issues with this blister.    Objective     /80 (BP Location: Left arm)   Pulse 76   Temp 98.6 °F (37 °C)   Ht 5' 5\" (1.651 m)   Wt 109 kg (241 lb)   BMI 40.10 kg/m²     Left ankle presents with incisions well-healed.  Previous blister areas healing nicely about the medial and lateral ankle. The medial blister non-visible anymore.  The lateral blistered area has healed with superficial pink discoloration  5 mm in size.  No signs of infection.  Light touch sensation is intact.  She has a negative squeeze test to the shin.  She has approximately 10 degrees plantarflexion, dorsiflexion to 0.  5 to 7 degrees of inversion/eversion.  She is able to flex the toes and extend the toes.  Homans' sign.  Calf pain or palpable cords.  No tenderness with palpation over the medial incision or lateral incision.  Do not pad was placed over the lateral blister area.  There is no pain with palpation over the lateral distal area.    Data Review "     I have personally reviewed pertinent films in PACS documenting good progressive healing with near anatomic alignment mild degenerative changes.     Fadi Rios PA-C

## 2024-06-27 ENCOUNTER — HOSPITAL ENCOUNTER (OUTPATIENT)
Dept: RADIOLOGY | Facility: CLINIC | Age: 74
End: 2024-06-27
Payer: COMMERCIAL

## 2024-06-27 ENCOUNTER — OFFICE VISIT (OUTPATIENT)
Dept: OBGYN CLINIC | Facility: CLINIC | Age: 74
End: 2024-06-27

## 2024-06-27 VITALS
SYSTOLIC BLOOD PRESSURE: 120 MMHG | TEMPERATURE: 97.6 F | OXYGEN SATURATION: 97 % | HEIGHT: 65 IN | DIASTOLIC BLOOD PRESSURE: 80 MMHG | HEART RATE: 72 BPM | BODY MASS INDEX: 40.15 KG/M2 | WEIGHT: 241 LBS

## 2024-06-27 DIAGNOSIS — S82.852D CLOSED TRIMALLEOLAR FRACTURE OF LEFT ANKLE WITH ROUTINE HEALING, SUBSEQUENT ENCOUNTER: ICD-10-CM

## 2024-06-27 DIAGNOSIS — S82.852D CLOSED TRIMALLEOLAR FRACTURE OF LEFT ANKLE WITH ROUTINE HEALING, SUBSEQUENT ENCOUNTER: Primary | ICD-10-CM

## 2024-06-27 PROCEDURE — 73610 X-RAY EXAM OF ANKLE: CPT

## 2024-06-27 PROCEDURE — 99024 POSTOP FOLLOW-UP VISIT: CPT | Performed by: ORTHOPAEDIC SURGERY

## 2024-06-27 NOTE — PATIENT INSTRUCTIONS
Patient is doing extremely well with her strength and range of motion.  She could potentially be discharged from formal physical therapy and progress to a home exercise program only.  Patient will be discharged from care and follow-up on an as-needed basis only.  She is encouraged to continue doing home exercises 3 times a week for the next 4 to 6 weeks.  She is going on vacation next week.  She is to return to the orthopedic office for any problems in the future no follow-up appointment has been scheduled.  She may come out of the ankle brace and into a regular shoe and transition off the cane over the next 2 weeks as tolerated.

## 2024-06-27 NOTE — PROGRESS NOTES
"Patient Name:  Karly Helton  MRN:  [id number]    Assessment     1. Closed trimalleolar fracture of left ankle with routine healing, subsequent encounter  XR ankle 3+ vw left        Plan     Patient is doing extremely well with her strength and range of motion.  She could potentially be discharged from formal physical therapy and progress to a home exercise program only.  Patient will be discharged from care and follow-up on an as-needed basis only.  She is encouraged to continue doing home exercises 3 times a week for the next 4 to 6 weeks.  She is going on vacation next week.  She is to return to the orthopedic office for any problems in the future no follow-up appointment has been scheduled.  She may come out of the ankle brace and into a regular shoe and transition off the cane over the next 2 weeks as tolerated.    Return if symptoms worsen or fail to improve.    Subjective   Karly Helton returns for follow-up of left ankle ORIF, DOS 4/23/2024.  The patient is 9 week(s) post op and returns for routine follow-up and x-ray. Patient denies pain or instability about the ankle.  She is going to outpatient therapy outside of [facility].  Baby papers were unable to be viewed.  Ambulates with a cane when out of the house.  She denies any numbness or tingling.  She is very pleased with her outcome.    Objective     /80   Pulse 72   Temp 97.6 °F (36.4 °C) (Temporal)   Ht 5' 5\" (1.651 m)   Wt 109 kg (241 lb)   SpO2 97%   BMI 40.10 kg/m²     Left ankle incisions well-healed.  She has trace swelling in the foot and ankle region as yet.  There is no calf pain negative Homans' sign.  Light touch sensation is intact.  Dorsi flexion 5 degrees plantarflexion 25 degrees inversion knee eversion approximately 20 degrees grossly symmetric to the contralateral side.  She has no pain with palpation throughout the ankle including the malleoli anterior joint line and posteriorly.  Is negative anterior drawer.  " Manage flexion dorsiflexion strength 5/5 and symmetric.  Only gait deficit is very slightly decreased pushoff.    Data Review     I have personally reviewed pertinent films in PACS documenting pleat healing of the fractures of the left ankle with medial and lateral malleolus hardware in good position and ankle mortise alignment.    KAREN HassanC

## 2024-11-02 ENCOUNTER — APPOINTMENT (EMERGENCY)
Dept: CT IMAGING | Facility: HOSPITAL | Age: 74
End: 2024-11-02
Payer: COMMERCIAL

## 2024-11-02 ENCOUNTER — APPOINTMENT (EMERGENCY)
Dept: ULTRASOUND IMAGING | Facility: HOSPITAL | Age: 74
End: 2024-11-02
Payer: COMMERCIAL

## 2024-11-02 ENCOUNTER — HOSPITAL ENCOUNTER (EMERGENCY)
Facility: HOSPITAL | Age: 74
End: 2024-11-03
Attending: EMERGENCY MEDICINE | Admitting: EMERGENCY MEDICINE
Payer: COMMERCIAL

## 2024-11-02 DIAGNOSIS — K80.50 CHOLEDOCHOLITHIASIS: Primary | ICD-10-CM

## 2024-11-02 LAB
ALBUMIN SERPL BCG-MCNC: 4.2 G/DL (ref 3.5–5)
ALP SERPL-CCNC: 474 U/L (ref 34–104)
ALT SERPL W P-5'-P-CCNC: 304 U/L (ref 7–52)
ANION GAP SERPL CALCULATED.3IONS-SCNC: 9 MMOL/L (ref 4–13)
AST SERPL W P-5'-P-CCNC: 418 U/L (ref 13–39)
BACTERIA UR QL AUTO: ABNORMAL /HPF
BASOPHILS # BLD AUTO: 0.03 THOUSANDS/ΜL (ref 0–0.1)
BASOPHILS NFR BLD AUTO: 1 % (ref 0–1)
BILIRUB SERPL-MCNC: 3.05 MG/DL (ref 0.2–1)
BILIRUB UR QL STRIP: ABNORMAL
BUN SERPL-MCNC: 16 MG/DL (ref 5–25)
CALCIUM SERPL-MCNC: 9.9 MG/DL (ref 8.4–10.2)
CAOX CRY URNS QL MICRO: ABNORMAL /HPF
CHLORIDE SERPL-SCNC: 101 MMOL/L (ref 96–108)
CLARITY UR: CLEAR
CO2 SERPL-SCNC: 28 MMOL/L (ref 21–32)
COLOR UR: YELLOW
CREAT SERPL-MCNC: 1.04 MG/DL (ref 0.6–1.3)
EOSINOPHIL # BLD AUTO: 0.05 THOUSAND/ΜL (ref 0–0.61)
EOSINOPHIL NFR BLD AUTO: 1 % (ref 0–6)
ERYTHROCYTE [DISTWIDTH] IN BLOOD BY AUTOMATED COUNT: 12.1 % (ref 11.6–15.1)
GFR SERPL CREATININE-BSD FRML MDRD: 52 ML/MIN/1.73SQ M
GLUCOSE SERPL-MCNC: 117 MG/DL (ref 65–140)
GLUCOSE UR STRIP-MCNC: NEGATIVE MG/DL
HCT VFR BLD AUTO: 41 % (ref 34.8–46.1)
HGB BLD-MCNC: 13.7 G/DL (ref 11.5–15.4)
HGB UR QL STRIP.AUTO: NEGATIVE
HYALINE CASTS #/AREA URNS LPF: ABNORMAL /LPF
IMM GRANULOCYTES # BLD AUTO: 0.01 THOUSAND/UL (ref 0–0.2)
IMM GRANULOCYTES NFR BLD AUTO: 0 % (ref 0–2)
KETONES UR STRIP-MCNC: ABNORMAL MG/DL
LEUKOCYTE ESTERASE UR QL STRIP: ABNORMAL
LIPASE SERPL-CCNC: 38 U/L (ref 11–82)
LYMPHOCYTES # BLD AUTO: 1.28 THOUSANDS/ΜL (ref 0.6–4.47)
LYMPHOCYTES NFR BLD AUTO: 21 % (ref 14–44)
MCH RBC QN AUTO: 31.4 PG (ref 26.8–34.3)
MCHC RBC AUTO-ENTMCNC: 33.4 G/DL (ref 31.4–37.4)
MCV RBC AUTO: 94 FL (ref 82–98)
MONOCYTES # BLD AUTO: 0.65 THOUSAND/ΜL (ref 0.17–1.22)
MONOCYTES NFR BLD AUTO: 10 % (ref 4–12)
MUCOUS THREADS UR QL AUTO: ABNORMAL
NEUTROPHILS # BLD AUTO: 4.22 THOUSANDS/ΜL (ref 1.85–7.62)
NEUTS SEG NFR BLD AUTO: 67 % (ref 43–75)
NITRITE UR QL STRIP: NEGATIVE
NON-SQ EPI CELLS URNS QL MICRO: ABNORMAL /HPF
NRBC BLD AUTO-RTO: 0 /100 WBCS
PH UR STRIP.AUTO: 7 [PH]
PLATELET # BLD AUTO: 212 THOUSANDS/UL (ref 149–390)
PMV BLD AUTO: 10.3 FL (ref 8.9–12.7)
POTASSIUM SERPL-SCNC: 3.4 MMOL/L (ref 3.5–5.3)
PROT SERPL-MCNC: 7.7 G/DL (ref 6.4–8.4)
PROT UR STRIP-MCNC: NEGATIVE MG/DL
RBC # BLD AUTO: 4.36 MILLION/UL (ref 3.81–5.12)
RBC #/AREA URNS AUTO: ABNORMAL /HPF
SODIUM SERPL-SCNC: 138 MMOL/L (ref 135–147)
SP GR UR STRIP.AUTO: 1.01 (ref 1–1.03)
URINE COMMENT: ABNORMAL
UROBILINOGEN UR QL STRIP.AUTO: >=8 E.U./DL
WBC # BLD AUTO: 6.24 THOUSAND/UL (ref 4.31–10.16)
WBC #/AREA URNS AUTO: ABNORMAL /HPF

## 2024-11-02 PROCEDURE — 87086 URINE CULTURE/COLONY COUNT: CPT | Performed by: EMERGENCY MEDICINE

## 2024-11-02 PROCEDURE — 76705 ECHO EXAM OF ABDOMEN: CPT

## 2024-11-02 PROCEDURE — 74177 CT ABD & PELVIS W/CONTRAST: CPT

## 2024-11-02 PROCEDURE — 85025 COMPLETE CBC W/AUTO DIFF WBC: CPT | Performed by: EMERGENCY MEDICINE

## 2024-11-02 PROCEDURE — 36415 COLL VENOUS BLD VENIPUNCTURE: CPT | Performed by: EMERGENCY MEDICINE

## 2024-11-02 PROCEDURE — 83690 ASSAY OF LIPASE: CPT | Performed by: EMERGENCY MEDICINE

## 2024-11-02 PROCEDURE — 80053 COMPREHEN METABOLIC PANEL: CPT | Performed by: EMERGENCY MEDICINE

## 2024-11-02 PROCEDURE — 96375 TX/PRO/DX INJ NEW DRUG ADDON: CPT

## 2024-11-02 PROCEDURE — 99284 EMERGENCY DEPT VISIT MOD MDM: CPT

## 2024-11-02 PROCEDURE — 81001 URINALYSIS AUTO W/SCOPE: CPT | Performed by: EMERGENCY MEDICINE

## 2024-11-02 PROCEDURE — 96374 THER/PROPH/DIAG INJ IV PUSH: CPT

## 2024-11-02 PROCEDURE — 99285 EMERGENCY DEPT VISIT HI MDM: CPT | Performed by: EMERGENCY MEDICINE

## 2024-11-02 RX ORDER — HYDROMORPHONE HCL/PF 1 MG/ML
0.5 SYRINGE (ML) INJECTION EVERY 2 HOUR PRN
Status: DISCONTINUED | OUTPATIENT
Start: 2024-11-02 | End: 2024-11-03 | Stop reason: HOSPADM

## 2024-11-02 RX ORDER — FAMOTIDINE 10 MG/ML
20 INJECTION, SOLUTION INTRAVENOUS ONCE
Status: COMPLETED | OUTPATIENT
Start: 2024-11-02 | End: 2024-11-02

## 2024-11-02 RX ORDER — ONDANSETRON 2 MG/ML
4 INJECTION INTRAMUSCULAR; INTRAVENOUS EVERY 4 HOURS PRN
Status: DISCONTINUED | OUTPATIENT
Start: 2024-11-02 | End: 2024-11-03 | Stop reason: HOSPADM

## 2024-11-02 RX ORDER — HYDROMORPHONE HCL IN WATER/PF 6 MG/30 ML
0.2 PATIENT CONTROLLED ANALGESIA SYRINGE INTRAVENOUS ONCE
Status: COMPLETED | OUTPATIENT
Start: 2024-11-02 | End: 2024-11-02

## 2024-11-02 RX ADMIN — FAMOTIDINE 20 MG: 10 INJECTION, SOLUTION INTRAVENOUS at 19:37

## 2024-11-02 RX ADMIN — HYDROMORPHONE HYDROCHLORIDE 0.2 MG: 0.2 INJECTION, SOLUTION INTRAMUSCULAR; INTRAVENOUS; SUBCUTANEOUS at 20:35

## 2024-11-02 RX ADMIN — IOHEXOL 100 ML: 350 INJECTION, SOLUTION INTRAVENOUS at 21:28

## 2024-11-02 NOTE — ED PROVIDER NOTES
Time reflects when diagnosis was documented in both MDM as applicable and the Disposition within this note       Time User Action Codes Description Comment    11/2/2024 11:53 PM Jennifer Preston Add [K80.50] Choledocholithiasis           ED Disposition       ED Disposition   Transfer to Another Facility-In Network    Condition   --    Date/Time   Sat Nov 2, 2024 11:53 PM    Comment   Karly Helton should be transferred out to [facility].               Assessment & Plan       Medical Decision Making  Abdominal exam without peritoneal signs.  No evidence of acute abdomen at this time.  Well-appearing.  Given work-up, low suspicion for acute pancreatitis (negative lipase), PUD (including gastric perforation), acute infectious processes (pneumonia, pyelonephritis), acute appendicitis, vascular catastrophe, bowel obstruction, viscus perforation, ovarian/testicular torsion, or diverticulitis.  Patient with transaminitis, elevated T. bili, imaging concerning for choledocholithiasis, unfortunately ERCP not available at this [place] at this time, therefore on-call GI recommended transfer to tertiary care center, patient ultimately accepted to medicine team at [facility] and gastroenterology aware    Problems Addressed:  Choledocholithiasis: acute illness or injury    Amount and/or Complexity of Data Reviewed  Labs: ordered. Decision-making details documented in ED Course.  Radiology: ordered and independent interpretation performed. Decision-making details documented in ED Course.    Risk  Prescription drug management.  Decision regarding hospitalization.        ED Course as of 11/03/24 0028   Sun Nov 03, 2024   0024 Secure chat message sent to on-call gastroenterologist, Dr. Lion, ERCP not available at this facility over the weekend or on Monday, recommends transfer to tertiary care center, plan discussed with patient and daughter at bedside, would prefer transfer to [facility] as it is the closest vicinity    0025 Secure chat message sent to [place] on-call GI, Dr. Andreea Martin, in agreement with transfer to medicine service at [facility]   0025 Patient discussed with ELIZABETH Sanderson, accepts patient for transfer to [facility] under Dr. John Avendaño, if patient develops fever or leukocytosis, would recommend giving Zosyn, however okay to hold off for now       Medications   HYDROmorphone (DILAUDID) injection 0.5 mg (has no administration in time range)   ondansetron (ZOFRAN) injection 4 mg (has no administration in time range)   Famotidine (PF) (PEPCID) injection 20 mg (20 mg Intravenous Given 11/2/24 1937)   HYDROmorphone HCl (DILAUDID) injection 0.2 mg (0.2 mg Intravenous Given 11/2/24 2035)   iohexol (OMNIPAQUE) 350 MG/ML injection (SINGLE-DOSE) 100 mL (100 mL Intravenous Given 11/2/24 2128)       ED Risk Strat Scores                           SBIRT 22yo+      Flowsheet Row Most Recent Value   Initial Alcohol Screen: US AUDIT-C     1. How often do you have a drink containing alcohol? 0 Filed at: 11/02/2024 1858   2. How many drinks containing alcohol do you have on a typical day you are drinking?  0 Filed at: 11/02/2024 1858   3b. FEMALE Any Age, or MALE 65+: How often do you have 4 or more drinks on one occassion? 0 Filed at: 11/02/2024 1858   Audit-C Score 0 Filed at: 11/02/2024 1858   PORFIRIO: How many times in the past year have you...    Used an illegal drug or used a prescription medication for non-medical reasons? Never Filed at: 11/02/2024 1858                            History of Present Illness       Chief Complaint   Patient presents with    Abdominal Pain     Had stomach pain on Monday for 45 minutes. Has had soft foods since and now has chills. Denies fevers, nv/d       Past Medical History:   Diagnosis Date    Bleeding disorder (HCC) 4/3/2017    Blood clotting disorder (HCC)     History of pulmonary embolus (PE)     Hypertension     Osteoarthritis 7/6/2017       Past Surgical History:   Procedure Laterality Date    CATARACT EXTRACTION W/  INTRAOCULAR LENS IMPLANT      HIP SURGERY      ORIF TIBIA & FIBULA FRACTURES Left 4/23/2024    Procedure: OPEN REDUCTION W/ INTERNAL FIXATION (ORIF) ANKLE;  Surgeon: Foreign De Paz;  Location:  MAIN OR;  Service: Orthopedics      Family History   Problem Relation Age of Onset    Hypertension Father     Cancer Mother       Social History     Tobacco Use    Smoking status: Never    Smokeless tobacco: Never   Vaping Use    Vaping status: Never Used   Substance Use Topics    Alcohol use: Not Currently    Drug use: Not Currently      E-Cigarette/Vaping    E-Cigarette Use Never User       E-Cigarette/Vaping Substances      I have reviewed and agree with the history as documented.     Patient is a 74-year-old female presenting to the emergency department complaining of abdominal pain which she experienced for about 45 minutes on Monday, since then she has been having nausea and vomiting, she has mild abdominal discomfort but not as severe as it was on Monday, she denies any fevers, she has been having some soft stools but had a normal bowel movement today, no blood in her stools, no urinary symptoms, no history of similar symptoms previously, no sick contacts, no questionable food intake        Review of Systems   Constitutional:  Positive for appetite change.   HENT: Negative.     Eyes: Negative.    Respiratory: Negative.     Cardiovascular: Negative.    Gastrointestinal:  Positive for abdominal pain, diarrhea, nausea and vomiting.   Endocrine: Negative.    Genitourinary: Negative.    Musculoskeletal: Negative.    Skin: Negative.    Allergic/Immunologic: Negative.    Neurological: Negative.    Hematological: Negative.    Psychiatric/Behavioral: Negative.             Objective       ED Triage Vitals   Temperature Pulse Blood Pressure Respirations SpO2 Patient Position - Orthostatic VS   11/02/24 1858 11/02/24 1858 11/02/24 1900 11/02/24 1858  11/02/24 1900 11/02/24 1900   98.3 °F (36.8 °C) 86 (!) 179/91 18 96 % Lying      Temp Source Heart Rate Source BP Location FiO2 (%) Pain Score    11/02/24 1858 11/02/24 1858 11/02/24 1900 -- --    Oral Monitor Right arm        Vitals      Date and Time Temp Pulse SpO2 Resp BP Pain Score FACES Pain Rating User   11/02/24 2300 -- 76 95 % 16 144/68 -- -- SV   11/02/24 2200 -- 68 96 % 20 157/77 -- -- SV   11/02/24 2130 -- 72 96 % 20 168/80 -- -- SV   11/02/24 2000 -- 66 99 % 18 179/84 -- -- SV   11/02/24 1900 -- -- 96 % -- 179/91 -- -- SL   11/02/24 1858 98.3 °F (36.8 °C) 86 -- 18 -- -- --             Physical Exam  Constitutional:       Appearance: She is well-developed.   HENT:      Head: Normocephalic and atraumatic.   Eyes:      Pupils: Pupils are equal, round, and reactive to light.   Cardiovascular:      Rate and Rhythm: Normal rate and regular rhythm.   Pulmonary:      Breath sounds: Normal breath sounds.   Abdominal:      Palpations: Abdomen is soft.      Tenderness: There is abdominal tenderness in the epigastric area.   Musculoskeletal:         General: Normal range of motion.      Cervical back: Normal range of motion and neck supple.   Skin:     General: Skin is warm and dry.   Neurological:      Mental Status: She is alert.   Psychiatric:         Behavior: Behavior normal.         Results Reviewed       Procedure Component Value Units Date/Time    Urine Microscopic [[id number]]  (Abnormal) Collected: 11/02/24 2047    Lab Status: Final result Specimen: Urine, Other Updated: 11/02/24 2143     RBC, UA 0-1 /hpf      WBC, UA 10-20 /hpf      Epithelial Cells Occasional /hpf      Bacteria, UA Moderate /hpf      Hyaline Casts, UA 1-2 /lpf      Ca Oxalate Minna, UA Occasional /hpf      MUCUS THREADS Occasional     URINE COMMENT Clue Cells Present    Urine culture [[id number]] Collected: 11/02/24 2047    Lab Status: In process Specimen: Urine, Other Updated: 11/02/24 2143    UA w Reflex to Microscopic w Reflex to  Culture [[id number]]  (Abnormal) Collected: 11/02/24 2047    Lab Status: Final result Specimen: Urine, Other Updated: 11/02/24 2057     Color, UA Yellow     Clarity, UA Clear     Specific Gravity, UA 1.010     pH, UA 7.0     Leukocytes, UA Moderate     Nitrite, UA Negative     Protein, UA Negative mg/dl      Glucose, UA Negative mg/dl      Ketones, UA 15 (1+) mg/dl      Urobilinogen, UA >=8.0 E.U./dl      Bilirubin, UA Moderate     Occult Blood, UA Negative    Comprehensive metabolic panel [[id number]]  (Abnormal) Collected: 11/02/24 1909    Lab Status: Final result Specimen: Blood from Arm, Right Updated: 11/02/24 1936     Sodium 138 mmol/L      Potassium 3.4 mmol/L      Chloride 101 mmol/L      CO2 28 mmol/L      ANION GAP 9 mmol/L      BUN 16 mg/dL      Creatinine 1.04 mg/dL      Glucose 117 mg/dL      Calcium 9.9 mg/dL       U/L       U/L      Alkaline Phosphatase 474 U/L      Total Protein 7.7 g/dL      Albumin 4.2 g/dL      Total Bilirubin 3.05 mg/dL      eGFR 52 ml/min/1.73sq m     Narrative:      National Kidney Disease Foundation guidelines for Chronic Kidney Disease (CKD):     Stage 1 with normal or high GFR (GFR > 90 mL/min/1.73 square meters)    Stage 2 Mild CKD (GFR = 60-89 mL/min/1.73 square meters)    Stage 3A Moderate CKD (GFR = 45-59 mL/min/1.73 square meters)    Stage 3B Moderate CKD (GFR = 30-44 mL/min/1.73 square meters)    Stage 4 Severe CKD (GFR = 15-29 mL/min/1.73 square meters)    Stage 5 End Stage CKD (GFR <15 mL/min/1.73 square meters)  Note: GFR calculation is accurate only with a steady state creatinine    Lipase [[id number]]  (Normal) Collected: 11/02/24 1909    Lab Status: Final result Specimen: Blood from Arm, Right Updated: 11/02/24 1936     Lipase 38 u/L     CBC and differential [[id number]] Collected: 11/02/24 1909    Lab Status: Final result Specimen: Blood from Arm, Right Updated: 11/02/24 1915     WBC 6.24 Thousand/uL      RBC 4.36 Million/uL      Hemoglobin 13.7 g/dL       Hematocrit 41.0 %      MCV 94 fL      MCH 31.4 pg      MCHC 33.4 g/dL      RDW 12.1 %      MPV 10.3 fL      Platelets 212 Thousands/uL      nRBC 0 /100 WBCs      Segmented % 67 %      Immature Grans % 0 %      Lymphocytes % 21 %      Monocytes % 10 %      Eosinophils Relative 1 %      Basophils Relative 1 %      Absolute Neutrophils 4.22 Thousands/µL      Absolute Immature Grans 0.01 Thousand/uL      Absolute Lymphocytes 1.28 Thousands/µL      Absolute Monocytes 0.65 Thousand/µL      Eosinophils Absolute 0.05 Thousand/µL      Basophils Absolute 0.03 Thousands/µL             CT abdomen pelvis with contrast   Final Interpretation by Brayan Page MD ([id number])      1.  Choledocholithiasis   2.  Enlarged endometrium warranting follow up pelvic ultrasound         Workstation performed: [id number]         US right upper quadrant   Final Interpretation by Brayan Page MD ([id number])      1.  Findings consistent with choledocholithiasis   2.  Gallbladder sludge   3.  Hepatic steatosis            Workstation performed: [id number]             Procedures    ED Medication and Procedure Management   Prior to Admission Medications   Prescriptions Last Dose Informant Patient Reported? Taking?   Bioflavonoid Products (Vitamin C) CHEW  Self Yes No   Sig: Chew 1 tablet daily   Calcium Carb-Cholecalciferol (Calcium 500 + D) 500-5 MG-MCG TABS  Self Yes No   Sig: Take 1 tablet by mouth in the morning   Cholecalciferol (Vitamin D3) 25 MCG (1000 UT) capsule   Yes No   Sig: Take 4,000 Units by mouth daily   Coenzyme Q10 100 MG TABS   Yes No   Sig: Take 100 mg by mouth daily   Omega-3 Fatty Acids (Omega-3 Fish Oil) 300 MG CAPS   Yes No   Sig: Take 1 tablet by mouth daily   Repatha 140 MG/ML SOSY   Yes No   Si mL every 14 (fourteen) days   Xarelto 10 MG tablet   Yes No   Sig: Take 10 mg by mouth daily with dinner   atenolol (TENORMIN) 25 mg tablet   Yes No   Sig: Take 25 mg by mouth every morning   ezetimibe (ZETIA) 10 mg tablet    Yes No   Sig: Take 10 mg by mouth daily   pravastatin (PRAVACHOL) 20 mg tablet  Self Yes No   Sig: Take 20 mg by mouth daily      Facility-Administered Medications: None     Patient's Medications   Discharge Prescriptions    No medications on file     No discharge procedures on file.  ED SEPSIS DOCUMENTATION   Time reflects when diagnosis was documented in both MDM as applicable and the Disposition within this note       Time User Action Codes Description Comment    11/2/2024 11:53 PM Jennifer Preston Add [K80.50] Choledocholithiasis                  Jennifer Preston DO  11/03/24 0028

## 2024-11-03 ENCOUNTER — HOSPITAL ENCOUNTER (INPATIENT)
Facility: HOSPITAL | Age: 74
LOS: 2 days | Discharge: HOME | DRG: 445 | End: 2024-11-05
Attending: STUDENT IN AN ORGANIZED HEALTH CARE EDUCATION/TRAINING PROGRAM
Payer: COMMERCIAL

## 2024-11-03 VITALS
HEART RATE: 56 BPM | SYSTOLIC BLOOD PRESSURE: 145 MMHG | OXYGEN SATURATION: 96 % | HEIGHT: 66 IN | WEIGHT: 220 LBS | RESPIRATION RATE: 16 BRPM | TEMPERATURE: 97.2 F | DIASTOLIC BLOOD PRESSURE: 70 MMHG | BODY MASS INDEX: 35.36 KG/M2

## 2024-11-03 DIAGNOSIS — K80.50 CHOLEDOCHOLITHIASIS: Primary | ICD-10-CM

## 2024-11-03 PROBLEM — Z12.11 SCREENING FOR COLON CANCER: Status: ACTIVE | Noted: 2024-11-03

## 2024-11-03 PROCEDURE — 99222 1ST HOSP IP/OBS MODERATE 55: CPT | Performed by: STUDENT IN AN ORGANIZED HEALTH CARE EDUCATION/TRAINING PROGRAM

## 2024-11-03 PROCEDURE — 99223 1ST HOSP IP/OBS HIGH 75: CPT

## 2024-11-03 PROCEDURE — 96375 TX/PRO/DX INJ NEW DRUG ADDON: CPT

## 2024-11-03 PROCEDURE — NC001 PR NO CHARGE: Performed by: SURGERY

## 2024-11-03 RX ORDER — SODIUM CHLORIDE AND POTASSIUM CHLORIDE 150; 900 MG/100ML; MG/100ML
75 INJECTION, SOLUTION INTRAVENOUS CONTINUOUS
Status: DISCONTINUED | OUTPATIENT
Start: 2024-11-03 | End: 2024-11-05 | Stop reason: HOSPADM

## 2024-11-03 RX ORDER — ACETAMINOPHEN 325 MG/1
650 TABLET ORAL EVERY 6 HOURS PRN
Status: DISCONTINUED | OUTPATIENT
Start: 2024-11-03 | End: 2024-11-05 | Stop reason: HOSPADM

## 2024-11-03 RX ORDER — CALCIUM CARBONATE 500 MG/1
1000 TABLET, CHEWABLE ORAL 3 TIMES DAILY PRN
Status: DISCONTINUED | OUTPATIENT
Start: 2024-11-03 | End: 2024-11-05 | Stop reason: HOSPADM

## 2024-11-03 RX ORDER — OXYCODONE HYDROCHLORIDE 5 MG/1
5 TABLET ORAL EVERY 6 HOURS PRN
Status: DISCONTINUED | OUTPATIENT
Start: 2024-11-03 | End: 2024-11-05 | Stop reason: HOSPADM

## 2024-11-03 RX ORDER — HYDROMORPHONE HCL/PF 1 MG/ML
0.2 SYRINGE (ML) INJECTION EVERY 4 HOURS PRN
Status: DISCONTINUED | OUTPATIENT
Start: 2024-11-03 | End: 2024-11-05 | Stop reason: HOSPADM

## 2024-11-03 RX ORDER — ONDANSETRON 2 MG/ML
4 INJECTION INTRAMUSCULAR; INTRAVENOUS EVERY 6 HOURS PRN
Status: DISCONTINUED | OUTPATIENT
Start: 2024-11-03 | End: 2024-11-05 | Stop reason: HOSPADM

## 2024-11-03 RX ORDER — ATENOLOL 25 MG/1
25 TABLET ORAL EVERY MORNING
Status: DISCONTINUED | OUTPATIENT
Start: 2024-11-03 | End: 2024-11-05 | Stop reason: HOSPADM

## 2024-11-03 RX ORDER — POTASSIUM CHLORIDE 1500 MG/1
40 TABLET, EXTENDED RELEASE ORAL ONCE
Status: DISCONTINUED | OUTPATIENT
Start: 2024-11-03 | End: 2024-11-03

## 2024-11-03 RX ADMIN — SODIUM CHLORIDE AND POTASSIUM CHLORIDE 75 ML/HR: .9; .15 SOLUTION INTRAVENOUS at 13:00

## 2024-11-03 RX ADMIN — HYDROMORPHONE HYDROCHLORIDE 0.5 MG: 1 INJECTION, SOLUTION INTRAMUSCULAR; INTRAVENOUS; SUBCUTANEOUS at 00:34

## 2024-11-03 RX ADMIN — ATENOLOL 25 MG: 25 TABLET ORAL at 11:30

## 2024-11-03 NOTE — ED NOTES
Report called and givent to Candis at [facility].   All questioned answered. Pt for transport by [facility]     Betty Castellanos RN  11/03/24 9233

## 2024-11-03 NOTE — ASSESSMENT & PLAN NOTE
Patient presented to the hospital with complaint of abdominal pain  Found to have choledocholithiasis with elevated LFTs  Transferred to [place] for ERCP  Gastroenterology following  Clear liquid diet today, n.p.o. at midnight  As needed analgesics  IV fluids

## 2024-11-03 NOTE — EMTALA/ACUTE CARE TRANSFER
[facility] EMERGENCY DEPARTMENT  [address] [facility] [address]  Dept: [phone]      EMTALA TRANSFER CONSENT    NAME Karly Helton                                         1950                              MRN [id number]    I have been informed of my rights regarding examination, treatment, and transfer   by Dr. Jennifer Preston DO    Benefits: Specialized equipment and/or services available at the receiving facility (Include comment)________________________    Risks: Potential for delay in receiving treatment, Potential deterioration of medical condition, Loss of IV, Increased discomfort during transfer, Possible worsening of condition or death during transfer      Consent for Transfer:  I acknowledge that my medical condition has been evaluated and explained to me by the emergency department physician or other qualified medical person and/or my attending physician, who has recommended that I be transferred to the service of  Accepting Physician: Dr John Avendaño at Accepting Facility Name, City & State : [facility]. The above potential benefits of such transfer, the potential risks associated with such transfer, and the probable risks of not being transferred have been explained to me, and I fully understand them.  The doctor has explained that, in my case, the benefits of transfer outweigh the risks.  I agree to be transferred.    I authorize the performance of emergency medical procedures and treatments upon me in both transit and upon arrival at the receiving facility.  Additionally, I authorize the release of any and all medical records to the receiving facility and request they be transported with me, if possible.  I understand that the safest mode of transportation during a medical emergency is an ambulance and that the Hospital advocates the use of this mode of transport. Risks of traveling to the receiving facility by car, including absence of  medical control, life sustaining equipment, such as oxygen, and medical personnel has been explained to me and I fully understand them.    (COLLEEN CORRECT BOX BELOW)  [  ]  I consent to the stated transfer and to be transported by ambulance/helicopter.  [  ]  I consent to the stated transfer, but refuse transportation by ambulance and accept full responsibility for my transportation by car.  I understand the risks of non-ambulance transfers and I exonerate the Hospital and its staff from any deterioration in my condition that results from this refusal.    X___________________________________________    DATE  24  TIME________  Signature of patient or legally responsible individual signing on patient behalf           RELATIONSHIP TO PATIENT_________________________          Provider Certification    NAME Karly Helton                                         1950                              MRN [id number]    A medical screening exam was performed on the above named patient.  Based on the examination:    Condition Necessitating Transfer The encounter diagnosis was Choledocholithiasis.    Patient Condition: The patient has been stabilized such that within reasonable medical probability, no material deterioration of the patient condition or the condition of the unborn child(deni) is likely to result from the transfer    Reason for Transfer: Level of Care needed not available at this facility    Transfer Requirements: Facility [facility] [place]   Space available and qualified personnel available for treatment as acknowledged by Magdalena Arteaga  Agreed to accept transfer and to provide appropriate medical treatment as acknowledged by       Dr John Avendaño  Appropriate medical records of the examination and treatment of the patient are provided at the time of transfer   STAFF INITIAL WHEN COMPLETED _______  Transfer will be performed by qualified personnel from    and appropriate transfer  equipment as required, including the use of necessary and appropriate life support measures.    Provider Certification: I have examined the patient and explained the following risks and benefits of being transferred/refusing transfer to the patient/family:  General risk, such as traffic hazards, adverse weather conditions, rough terrain or turbulence, possible failure of equipment (including vehicle or aircraft), or consequences of actions of persons outside the control of the transport personnel, Unanticipated needs of medical equipment and personnel during transport, Risk of worsening condition, The possibility of a transport vehicle being unavailable      Based on these reasonable risks and benefits to the patient and/or the unborn child(deni), and based upon the information available at the time of the patient’s examination, I certify that the medical benefits reasonably to be expected from the provision of appropriate medical treatments at another medical facility outweigh the increasing risks, if any, to the individual’s medical condition, and in the case of labor to the unborn child, from effecting the transfer.    X____________________________________________ DATE 11/02/24        TIME_______      ORIGINAL - SEND TO MEDICAL RECORDS   COPY - SEND WITH PATIENT DURING TRANSFER

## 2024-11-03 NOTE — ASSESSMENT & PLAN NOTE
No sign of acute cholecystitis or pancreatitis  Consider discussion about elective cholecystectomy to prevent further recurrence

## 2024-11-03 NOTE — CONSULTS
Consultation - Surgery-General   Name: Karly Helton 74 y.o. female I MRN: [id number]  Unit/Bed#: E2 -01 I Date of Admission: 11/3/2024   Date of Service: 11/3/2024 I Hospital Day: 0   Inpatient consult to Acute Care Surgery  Consult performed by: Livan Seay MD  Consult ordered by: Donna Hernandez PA-C        Physician Requesting Evaluation: Brooke Cota MD   Reason for Evaluation / Principal Problem: Choledocholithiasis    Assessment & Plan  Choledocholithiasis  No sign of acute cholecystitis or pancreatitis  Consider discussion about elective cholecystectomy to prevent further recurrence  Prothrombin gene mutation (HCC)    Essential hypertension    Mixed hyperlipidemia    Screening for colon cancer      History of Present Illness   Karly Helton is a 74 y.o. female with a familial bleeding disorder, history of PE in 2016 on Xarelto (last dose 11/2) who presents with choledocholithiasis, without cholecystitis.  She had pain for 6 days, that got worse after a meal 1 day ago.  No nausea, no vomiting.  Having bowel movements she is scheduled for ERCP tomorrow 11/4.    Review of Systems   Constitutional: Negative.    Eyes: Negative.    Respiratory: Negative.     Cardiovascular: Negative.    Gastrointestinal: Negative.    Endocrine: Negative.    Genitourinary: Negative.    Musculoskeletal: Negative.    Skin: Negative.    Neurological: Negative.    Hematological: Negative.      Objective :  Temp:  [97.1 °F (36.2 °C)-99.7 °F (37.6 °C)] 99.7 °F (37.6 °C)  HR:  [55-86] 59  BP: (112-179)/(60-91) 157/74  Resp:  [16-20] 18  SpO2:  [95 %-99 %] 97 %  O2 Device: None (Room air)      Physical Exam  Constitutional:       Appearance: Normal appearance.   HENT:      Head: Normocephalic and atraumatic.      Mouth/Throat:      Mouth: Mucous membranes are moist.   Eyes:      Extraocular Movements: Extraocular movements intact.   Cardiovascular:      Rate and Rhythm: Normal rate and regular rhythm.   Pulmonary:       Effort: Pulmonary effort is normal.   Abdominal:      General: Abdomen is flat.      Palpations: Abdomen is soft.   Musculoskeletal:         General: Normal range of motion.      Cervical back: Normal range of motion and neck supple.   Skin:     General: Skin is warm and dry.   Neurological:      General: No focal deficit present.      Mental Status: She is alert and oriented to person, place, and time.

## 2024-11-03 NOTE — ED NOTES
"While asking the patient about her pain, her daughter stated \"I don't know if I should really believe you or not.\" Patient stated she had pain and was medicated per orders.     Tamar Graves RN  11/02/24 2055    "

## 2024-11-03 NOTE — PLAN OF CARE
Problem: GASTROINTESTINAL - ADULT  Goal: Minimal or absence of nausea and/or vomiting  Description: INTERVENTIONS:  - Administer IV fluids if ordered to ensure adequate hydration  - Maintain NPO status until nausea and vomiting are resolved  - Nasogastric tube if ordered  - Administer ordered antiemetic medications as needed  - Provide nonpharmacologic comfort measures as appropriate  - Advance diet as tolerated, if ordered  - Consider nutrition services referral to assist patient with adequate nutrition and appropriate food choices  Outcome: Progressing  Goal: Maintains or returns to baseline bowel function  Description: INTERVENTIONS:  - Assess bowel function  - Encourage oral fluids to ensure adequate hydration  - Administer IV fluids if ordered to ensure adequate hydration  - Administer ordered medications as needed  - Encourage mobilization and activity  - Consider nutritional services referral to assist patient with adequate nutrition and appropriate food choices  Outcome: Progressing  Goal: Maintains adequate nutritional intake  Description: INTERVENTIONS:  - Monitor percentage of each meal consumed  - Identify factors contributing to decreased intake, treat as appropriate  - Assist with meals as needed  - Monitor I&O, weight, and lab values if indicated  - Obtain nutrition services referral as needed  Outcome: Progressing  Goal: Establish and maintain optimal ostomy function  Description: INTERVENTIONS:  - Assess bowel function  - Encourage oral fluids to ensure adequate hydration  - Administer IV fluids if ordered to ensure adequate hydration   - Administer ordered medications as needed  - Encourage mobilization and activity  - Nutrition services referral to assist patient with appropriate food choices  - Assess stoma site  - Consider wound care consult   Outcome: Progressing  Goal: Oral mucous membranes remain intact  Description: INTERVENTIONS  - Assess oral mucosa and hygiene practices  - Implement  preventative oral hygiene regimen  - Implement oral medicated treatments as ordered  - Initiate Nutrition services referral as needed  Outcome: Progressing     Problem: Knowledge Deficit  Goal: Patient/family/caregiver demonstrates understanding of disease process, treatment plan, medications, and discharge instructions  Description: Complete learning assessment and assess knowledge base.  Interventions:  - Provide teaching at level of understanding  - Provide teaching via preferred learning methods  Outcome: Progressing     Problem: PAIN - ADULT  Goal: Verbalizes/displays adequate comfort level or baseline comfort level  Description: Interventions:  - Encourage patient to monitor pain and request assistance  - Assess pain using appropriate pain scale  - Administer analgesics based on type and severity of pain and evaluate response  - Implement non-pharmacological measures as appropriate and evaluate response  - Consider cultural and social influences on pain and pain management  - Notify physician/advanced practitioner if interventions unsuccessful or patient reports new pain  Outcome: Progressing

## 2024-11-03 NOTE — ASSESSMENT & PLAN NOTE
Last colonoscopy completed 11/2021 at [facility] with Dr. Ovalles. Removed 18mm cecal polyp with path showing TV adenoma.   Patient scheduled for her surveillance colonoscopy in 2 weeks and asking whether or not she should postpone  Would recommend postponing until after recovery from this acute event and interventions have been performed for both choledocholithiasis and cholelithiasis

## 2024-11-03 NOTE — ASSESSMENT & PLAN NOTE
History of hyperlipidemia maintained outpatient on pravastatin, Repatha and Zetia, hold in the setting of elevated LFTs

## 2024-11-03 NOTE — CONSULTS
Consultation - Gastroenterology   Name: Karly Helton 74 y.o. female I MRN: [id number]  Unit/Bed#: E2 -01 I Date of Admission: 11/3/2024   Date of Service: 11/3/2024 I Hospital Day: 0       Inpatient consult to gastroenterology     Date/Time  11/3/2024 9:51 AM     Performed by  Andreea Martin DO   Authorized by  Donna Hernandez PA-C           Physician Requesting Evaluation: Brooke Cota MD   Reason for Evaluation / Principal Problem: Choledocholithiasis      Karly Helton is a 74 y.o. female with past medical history significant for hypertension, hyperlipidemia, prothrombin gene mutation on Xarelto who presented to [facility] 11/2 for concern of significant abdominal pain located in the right upper quadrant with associated nausea and vomiting. Workup notable for elevated LFT's with choledocholithiasis and patient transferred to [facility] for intervention. GI consulted for further recommendation  Assessment & Plan  Choledocholithiasis  Patient presented with right upper quadrant abdominal pain, nausea, vomiting with elevated LFTs on presentation and dilated CBD with intraluminal filling defects on both CT and right upper quadrant ultrasound concerning for choledocholithiasis.  Patient transferred to [facility] for further evaluation and intervention.  Tentative plan for ERCP tomorrow  Please keep NPO at midnight  Clear liquids today given N/V  From GI perspective can hold antibiotics and monitor vitals closely - if any clinical decompensation low threshold to initiate  Please hold AC for ERCP  Appreciate surgical evaluation for consideration of cholecystectomy in future    Prothrombin gene mutation (HCC)  On xarelto. Last done on 11/2 between 2 and 3pm. Hold AC for procedure tomorrow    Screening for colon cancer  Last colonoscopy completed 11/2021 at [facility] with Dr. Ovalles. Removed 18mm cecal polyp with path showing TV adenoma.   Patient scheduled for her surveillance colonoscopy in 2  weeks and asking whether or not she should postpone  Would recommend postponing until after recovery from this acute event and interventions have been performed for both choledocholithiasis and cholelithiasis      History of Present Illness   HPI:  Karly Helton is a 74 y.o. female with past medical history significant for hypertension, hyperlipidemia, prothrombin gene mutation on Xarelto who presented to [facility] 11/2 for concern of significant abdominal pain located in the right upper quadrant with associated nausea and vomiting.  Symptoms have been fluctuating over the last week and acutely worsened on day of presentation. With failure of symptoms to resolve, patient presented for evaluation.     Vital signs stable at time of presentation.  Labs notable for AST 14, , alk phos 474, T. bili 3.05 with no leukocytosis. CT scan on presentation notable for intraluminal density within the CBD with a duct dilated measuring 12 mm.  Follow-up right upper quadrant ultrasound notable for 2 ovoid echogenic structures within the distal CBD largest of which measures 1.1 cm, gallbladder sludge.  Patient transferred to [facility] for GI and surgical evaluation    At time my evaluation, patient reports her pain is completely resolved and her nausea has significantly improved.  She has not attempted p.o. intake at, but has clear liquids at bedside to try. Having normal bowel movements    Review of Systems   Constitutional:  Positive for appetite change and chills. Negative for fatigue and fever.   HENT:  Negative for sore throat and trouble swallowing.    Respiratory:  Negative for cough and shortness of breath.    Cardiovascular:  Negative for chest pain and leg swelling.   Gastrointestinal:  Positive for abdominal pain, nausea and vomiting. Negative for abdominal distention, blood in stool, constipation and diarrhea.   Musculoskeletal:  Positive for back pain. Negative for arthralgias.   Skin:  Positive for color change. Negative  for rash.   Neurological:  Negative for dizziness, weakness, light-headedness and headaches.     I have reviewed the patient's PMH, PSH, Social History, Family History, Meds, and Allergies  Historical Information   Past Medical History:   Diagnosis Date    Bleeding disorder (HCC) 4/3/2017    Blood clotting disorder (HCC)     History of pulmonary embolus (PE)     Hypertension     Osteoarthritis 7/6/2017     Past Surgical History:   Procedure Laterality Date    CATARACT EXTRACTION W/  INTRAOCULAR LENS IMPLANT      HIP SURGERY      ORIF TIBIA & FIBULA FRACTURES Left 4/23/2024    Procedure: OPEN REDUCTION W/ INTERNAL FIXATION (ORIF) ANKLE;  Surgeon: Foreign De Paz;  Location:  MAIN OR;  Service: Orthopedics     Social History     Tobacco Use    Smoking status: Never    Smokeless tobacco: Never   Vaping Use    Vaping status: Never Used   Substance and Sexual Activity    Alcohol use: Not Currently    Drug use: Not Currently    Sexual activity: Not Currently     E-Cigarette/Vaping    E-Cigarette Use Never User      E-Cigarette/Vaping Substances     Family History   Problem Relation Age of Onset    Hypertension Father     Cancer Mother      Social History     Tobacco Use    Smoking status: Never    Smokeless tobacco: Never   Vaping Use    Vaping status: Never Used   Substance and Sexual Activity    Alcohol use: Not Currently    Drug use: Not Currently    Sexual activity: Not Currently       Current Facility-Administered Medications:     acetaminophen (TYLENOL) tablet 650 mg, Q6H PRN    atenolol (TENORMIN) tablet 25 mg, QAM    calcium carbonate (TUMS) chewable tablet 1,000 mg, TID PRN    HYDROmorphone (DILAUDID) injection 0.2 mg, Q4H PRN    ondansetron (ZOFRAN) injection 4 mg, Q6H PRN    oxyCODONE (ROXICODONE) IR tablet 5 mg, Q6H PRN    oxyCODONE (ROXICODONE) split tablet 2.5 mg, Q6H PRN    [Held by provider] rivaroxaban (XARELTO) tablet 10 mg, Daily With Dinner    sodium chloride 0.9 % with KCl 20 mEq/L infusion (premix),  Continuous, Last Rate: 75 mL/hr (24 1300)  Prior to Admission Medications   Prescriptions Last Dose Informant Patient Reported? Taking?   Bioflavonoid Products (Vitamin C) CHEW  Self Yes No   Sig: Chew 1 tablet daily   Calcium Carb-Cholecalciferol (Calcium 500 + D) 500-5 MG-MCG TABS  Self Yes No   Sig: Take 1 tablet by mouth in the morning   Cholecalciferol (Vitamin D3) 25 MCG (1000 UT) capsule   Yes No   Sig: Take 4,000 Units by mouth daily   Coenzyme Q10 100 MG TABS   Yes No   Sig: Take 100 mg by mouth daily   Omega-3 Fatty Acids (Omega-3 Fish Oil) 300 MG CAPS   Yes No   Sig: Take 1 tablet by mouth daily   Repatha 140 MG/ML SOSY   Yes No   Si mL every 14 (fourteen) days   Xarelto 10 MG tablet   Yes No   Sig: Take 10 mg by mouth daily with dinner   atenolol (TENORMIN) 25 mg tablet   Yes No   Sig: Take 25 mg by mouth every morning   ezetimibe (ZETIA) 10 mg tablet   Yes No   Sig: Take 10 mg by mouth daily   pravastatin (PRAVACHOL) 20 mg tablet  Self Yes No   Sig: Take 20 mg by mouth daily      Facility-Administered Medications: None     Atorvastatin and Ezetimibe-simvastatin    Objective :  Temp:  [97.1 °F (36.2 °C)-99.7 °F (37.6 °C)] 99.7 °F (37.6 °C)  HR:  [55-86] 58  BP: (112-179)/(60-91) 161/82  Resp:  [16-20] 20  SpO2:  [95 %-99 %] 96 %  O2 Device: None (Room air)    Physical Exam  Vitals and nursing note reviewed.   Constitutional:       General: She is not in acute distress.     Appearance: Normal appearance. She is not ill-appearing.   HENT:      Head: Normocephalic and atraumatic.      Nose: Nose normal.      Mouth/Throat:      Mouth: Mucous membranes are moist.   Eyes:      General: Scleral icterus present.      Conjunctiva/sclera: Conjunctivae normal.   Cardiovascular:      Rate and Rhythm: Normal rate and regular rhythm.   Pulmonary:      Effort: Pulmonary effort is normal. No respiratory distress.   Abdominal:      General: Bowel sounds are normal. There is no distension.      Palpations:  Abdomen is soft. There is no mass.      Tenderness: There is no abdominal tenderness. There is no guarding or rebound.      Hernia: No hernia is present.   Musculoskeletal:         General: Normal range of motion.      Cervical back: Normal range of motion.      Right lower leg: No edema.      Left lower leg: No edema.   Skin:     General: Skin is warm and dry.      Coloration: Skin is jaundiced.      Findings: No bruising.   Neurological:      General: No focal deficit present.      Mental Status: She is alert and oriented to person, place, and time.      Motor: No weakness.   Psychiatric:         Mood and Affect: Mood normal.         Behavior: Behavior normal.           Lab Results: I have reviewed the following results:CBC/BMP:   .     11/02/24 1909   WBC 6.24   HGB 13.7   HCT 41.0      SODIUM 138   K 3.4*      CO2 28   BUN 16   CREATININE 1.04   GLUC 117    , LFTs:   .     11/02/24 1909   *   *   ALB 4.2   TBILI 3.05*   ALKPHOS 474*    , PTT/INR:No new results in last 24 hours.     Imaging Results Review: I personally reviewed the following image studies in PACS and associated radiology reports: CT abdomen/pelvis. My interpretation of the radiology images/reports is: dilated CBD - intraluminal filling defect.

## 2024-11-03 NOTE — ASSESSMENT & PLAN NOTE
Patient presented with right upper quadrant abdominal pain, nausea, vomiting with elevated LFTs on presentation and dilated CBD with intraluminal filling defects on both CT and right upper quadrant ultrasound concerning for choledocholithiasis.  Patient transferred to [facility] for further evaluation and intervention.  Tentative plan for ERCP tomorrow  Please keep NPO at midnight  Clear liquids today given N/V  From GI perspective can hold antibiotics and monitor vitals closely - if any clinical decompensation low threshold to initiate  Please hold AC for ERCP  Appreciate surgical evaluation for consideration of cholecystectomy in future

## 2024-11-03 NOTE — H&P
H&P - Hospitalist   Name: Karly Helton 74 y.o. female I MRN: [id number]  Unit/Bed#: E2 -01 I Date of Admission: 11/3/2024   Date of Service: 11/3/2024 I Hospital Day: 0     Assessment & Plan  Choledocholithiasis  Patient presented to the hospital with complaint of abdominal pain  Found to have choledocholithiasis with elevated LFTs  Transferred to [place] for ERCP  Gastroenterology following  Clear liquid diet today, n.p.o. at midnight  As needed analgesics  IV fluids  Prothrombin gene mutation (HCC)  History of PE maintained outpatient on Xarelto  Hold Xarelto pending ERCP  Essential hypertension  BP slightly elevated at time of admission  Continue atenolol  Mixed hyperlipidemia  History of hyperlipidemia maintained outpatient on pravastatin, Repatha and Zetia, hold in the setting of elevated LFTs      VTE Pharmacologic Prophylaxis: VTE Score: 13 High Risk (Score >/= 5) - Pharmacological DVT Prophylaxis Contraindicated. Sequential Compression Devices Ordered.  Code Status: Level 1 - Full Code   Discussion with family: Updated  (daughter) at bedside.    Anticipated Length of Stay: Patient will be admitted on an inpatient basis with an anticipated length of stay of greater than 2 midnights secondary to choledocholithiasis.    History of Present Illness   Chief Complaint: Abdominal pain    Karly Helton is a 74 y.o. female with a PMH of hyperlipidemia, hypertension who presents with abdominal pain.  Patient initially presented to [facility] for complaint of abdominal pain.  She notes very severe pain after eating breakfast on Monday, has been having intermittent pain since then.  Noted on Saturday evening she developed recurrent worsening pain which prompted her ED visit.  She was found to have choledocholithiasis and transfer was requested to [facility].  She reports poor oral intake over the past week but denies any significant nausea or vomiting.  Continues to  have bowel movements.  Denies any prior history of biliary colic.  Denies any fevers or chills.    Review of Systems   Constitutional:  Negative for chills and fever.   HENT:  Negative for trouble swallowing.    Eyes:  Negative for visual disturbance.   Respiratory:  Negative for cough and shortness of breath.    Cardiovascular:  Negative for chest pain and leg swelling.   Gastrointestinal:  Positive for abdominal pain. Negative for nausea and vomiting.   Genitourinary:  Negative for difficulty urinating.   Musculoskeletal:  Negative for gait problem.   Skin:  Negative for rash.   Neurological:  Negative for dizziness and weakness.   Psychiatric/Behavioral:  Negative for confusion.        Historical Information   Past Medical History:   Diagnosis Date    Bleeding disorder (HCC) 4/3/2017    Blood clotting disorder (HCC)     History of pulmonary embolus (PE)     Hypertension     Osteoarthritis 7/6/2017     Past Surgical History:   Procedure Laterality Date    CATARACT EXTRACTION W/  INTRAOCULAR LENS IMPLANT      HIP SURGERY      ORIF TIBIA & FIBULA FRACTURES Left 4/23/2024    Procedure: OPEN REDUCTION W/ INTERNAL FIXATION (ORIF) ANKLE;  Surgeon: Foreign De Paz;  Location:  MAIN OR;  Service: Orthopedics     Social History     Tobacco Use    Smoking status: Never    Smokeless tobacco: Never   Vaping Use    Vaping status: Never Used   Substance and Sexual Activity    Alcohol use: Not Currently    Drug use: Not Currently    Sexual activity: Not Currently     E-Cigarette/Vaping    E-Cigarette Use Never User      E-Cigarette/Vaping Substances     Family History   Problem Relation Age of Onset    Hypertension Father     Cancer Mother      Social History:  Marital Status: /Civil Union   Occupation: Unknown  Patient Pre-hospital Living Situation: Home  Patient Pre-hospital Level of Mobility: walks  Patient Pre-hospital Diet Restrictions: None    Meds/Allergies   I have reviewed home medications with patient  personally.  Prior to Admission medications    Medication Sig Start Date End Date Taking? Authorizing Provider   atenolol (TENORMIN) 25 mg tablet Take 25 mg by mouth every morning    Historical Provider, MD   Bioflavonoid Products (Vitamin C) CHEW Chew 1 tablet daily    Historical Provider, MD   Calcium Carb-Cholecalciferol (Calcium 500 + D) 500-5 MG-MCG TABS Take 1 tablet by mouth in the morning    Historical Provider, MD   Cholecalciferol (Vitamin D3) 25 MCG (1000 UT) capsule Take 4,000 Units by mouth daily    Historical Provider, MD   Coenzyme Q10 100 MG TABS Take 100 mg by mouth daily    Historical Provider, MD   ezetimibe (ZETIA) 10 mg tablet Take 10 mg by mouth daily 9/7/23   Historical Provider, MD   Omega-3 Fatty Acids (Omega-3 Fish Oil) 300 MG CAPS Take 1 tablet by mouth daily    Historical Provider, MD   pravastatin (PRAVACHOL) 20 mg tablet Take 20 mg by mouth daily 1/3/24   Historical Provider, MD   Repatha 140 MG/ML SOSY 1 mL every 14 (fourteen) days 3/4/24   Historical Provider, MD   Xarelto 10 MG tablet Take 10 mg by mouth daily with dinner 1/21/24   Historical Provider, MD     Allergies   Allergen Reactions    Atorvastatin Arthralgia, Myalgia and Rash    Ezetimibe-Simvastatin Diarrhea, GI Intolerance and Vomiting       Objective :  Temp:  [97.1 °F (36.2 °C)-99.7 °F (37.6 °C)] 99.7 °F (37.6 °C)  HR:  [55-86] 58  BP: (112-179)/(60-91) 161/82  Resp:  [16-20] 20  SpO2:  [95 %-99 %] 96 %  O2 Device: None (Room air)    Physical Exam  Vitals reviewed.   Constitutional:       General: She is not in acute distress.  HENT:      Head: Normocephalic and atraumatic.   Eyes:      General: No scleral icterus.     Conjunctiva/sclera: Conjunctivae normal.   Cardiovascular:      Rate and Rhythm: Normal rate and regular rhythm.      Heart sounds: No murmur heard.  Pulmonary:      Effort: Pulmonary effort is normal. No respiratory distress.      Breath sounds: Normal breath sounds.   Abdominal:      General: Bowel sounds  "are normal. There is no distension.      Palpations: Abdomen is soft.      Tenderness: There is no abdominal tenderness.   Musculoskeletal:      Cervical back: Neck supple.      Right lower leg: No edema.      Left lower leg: No edema.   Skin:     General: Skin is warm and dry.   Neurological:      Mental Status: She is alert and oriented to person, place, and time.   Psychiatric:         Mood and Affect: Mood normal.         Behavior: Behavior normal.          Lines/Drains:            Lab Results: I have reviewed the following results:  Results from last 7 days   Lab Units 11/02/24  1909   WBC Thousand/uL 6.24   HEMOGLOBIN g/dL 13.7   HEMATOCRIT % 41.0   PLATELETS Thousands/uL 212   SEGS PCT % 67   LYMPHO PCT % 21   MONO PCT % 10   EOS PCT % 1     Results from last 7 days   Lab Units 11/02/24  1909   SODIUM mmol/L 138   POTASSIUM mmol/L 3.4*   CHLORIDE mmol/L 101   CO2 mmol/L 28   BUN mg/dL 16   CREATININE mg/dL 1.04   ANION GAP mmol/L 9   CALCIUM mg/dL 9.9   ALBUMIN g/dL 4.2   TOTAL BILIRUBIN mg/dL 3.05*   ALK PHOS U/L 474*   ALT U/L 304*   AST U/L 418*   GLUCOSE RANDOM mg/dL 117             No results found for: \"HGBA1C\"              Administrative Statements       ** Please Note: This note has been constructed using a voice recognition system. **    "

## 2024-11-04 ENCOUNTER — APPOINTMENT (OUTPATIENT)
Dept: RADIOLOGY | Facility: HOSPITAL | Age: 74
DRG: 445 | End: 2024-11-04
Payer: COMMERCIAL

## 2024-11-04 ENCOUNTER — PREP FOR PROCEDURE (OUTPATIENT)
Dept: GASTROENTEROLOGY | Facility: CLINIC | Age: 74
End: 2024-11-04

## 2024-11-04 ENCOUNTER — ANESTHESIA EVENT (INPATIENT)
Dept: GASTROENTEROLOGY | Facility: HOSPITAL | Age: 74
DRG: 445 | End: 2024-11-04
Payer: COMMERCIAL

## 2024-11-04 ENCOUNTER — APPOINTMENT (INPATIENT)
Dept: GASTROENTEROLOGY | Facility: HOSPITAL | Age: 74
DRG: 445 | End: 2024-11-04
Payer: COMMERCIAL

## 2024-11-04 ENCOUNTER — ANESTHESIA (INPATIENT)
Dept: GASTROENTEROLOGY | Facility: HOSPITAL | Age: 74
DRG: 445 | End: 2024-11-04
Payer: COMMERCIAL

## 2024-11-04 DIAGNOSIS — K80.50 CHOLEDOCHOLITHIASIS: Primary | ICD-10-CM

## 2024-11-04 PROBLEM — R93.89 ABNORMAL CT SCAN: Status: ACTIVE | Noted: 2024-11-04

## 2024-11-04 LAB
ALBUMIN SERPL BCG-MCNC: 3.5 G/DL (ref 3.5–5)
ALP SERPL-CCNC: 347 U/L (ref 34–104)
ALT SERPL W P-5'-P-CCNC: 229 U/L (ref 7–52)
ANION GAP SERPL CALCULATED.3IONS-SCNC: 6 MMOL/L (ref 4–13)
AST SERPL W P-5'-P-CCNC: 169 U/L (ref 13–39)
BACTERIA UR CULT: NORMAL
BASOPHILS # BLD AUTO: 0.03 THOUSANDS/ΜL (ref 0–0.1)
BASOPHILS NFR BLD AUTO: 1 % (ref 0–1)
BILIRUB SERPL-MCNC: 1.08 MG/DL (ref 0.2–1)
BUN SERPL-MCNC: 10 MG/DL (ref 5–25)
CALCIUM SERPL-MCNC: 9.5 MG/DL (ref 8.4–10.2)
CHLORIDE SERPL-SCNC: 109 MMOL/L (ref 96–108)
CO2 SERPL-SCNC: 26 MMOL/L (ref 21–32)
CREAT SERPL-MCNC: 0.82 MG/DL (ref 0.6–1.3)
EOSINOPHIL # BLD AUTO: 0.12 THOUSAND/ΜL (ref 0–0.61)
EOSINOPHIL NFR BLD AUTO: 3 % (ref 0–6)
ERYTHROCYTE [DISTWIDTH] IN BLOOD BY AUTOMATED COUNT: 12.6 % (ref 11.6–15.1)
GFR SERPL CREATININE-BSD FRML MDRD: 70 ML/MIN/1.73SQ M
GLUCOSE SERPL-MCNC: 87 MG/DL (ref 65–140)
HCT VFR BLD AUTO: 39.1 % (ref 34.8–46.1)
HGB BLD-MCNC: 13 G/DL (ref 11.5–15.4)
IMM GRANULOCYTES # BLD AUTO: 0 THOUSAND/UL (ref 0–0.2)
IMM GRANULOCYTES NFR BLD AUTO: 0 % (ref 0–2)
LYMPHOCYTES # BLD AUTO: 1.26 THOUSANDS/ΜL (ref 0.6–4.47)
LYMPHOCYTES NFR BLD AUTO: 26 % (ref 14–44)
MCH RBC QN AUTO: 31.8 PG (ref 26.8–34.3)
MCHC RBC AUTO-ENTMCNC: 33.2 G/DL (ref 31.4–37.4)
MCV RBC AUTO: 96 FL (ref 82–98)
MONOCYTES # BLD AUTO: 0.51 THOUSAND/ΜL (ref 0.17–1.22)
MONOCYTES NFR BLD AUTO: 11 % (ref 4–12)
NEUTROPHILS # BLD AUTO: 2.96 THOUSANDS/ΜL (ref 1.85–7.62)
NEUTS SEG NFR BLD AUTO: 59 % (ref 43–75)
NRBC BLD AUTO-RTO: 0 /100 WBCS
PLATELET # BLD AUTO: 209 THOUSANDS/UL (ref 149–390)
PMV BLD AUTO: 10.4 FL (ref 8.9–12.7)
POTASSIUM SERPL-SCNC: 4.1 MMOL/L (ref 3.5–5.3)
PROT SERPL-MCNC: 6.6 G/DL (ref 6.4–8.4)
RBC # BLD AUTO: 4.09 MILLION/UL (ref 3.81–5.12)
SODIUM SERPL-SCNC: 141 MMOL/L (ref 135–147)
WBC # BLD AUTO: 4.88 THOUSAND/UL (ref 4.31–10.16)

## 2024-11-04 PROCEDURE — 85025 COMPLETE CBC W/AUTO DIFF WBC: CPT | Performed by: PHYSICIAN ASSISTANT

## 2024-11-04 PROCEDURE — 99232 SBSQ HOSP IP/OBS MODERATE 35: CPT | Performed by: INTERNAL MEDICINE

## 2024-11-04 PROCEDURE — 74328 X-RAY BILE DUCT ENDOSCOPY: CPT

## 2024-11-04 PROCEDURE — 0FC98ZZ EXTIRPATION OF MATTER FROM COMMON BILE DUCT, VIA NATURAL OR ARTIFICIAL OPENING ENDOSCOPIC: ICD-10-PCS | Performed by: INTERNAL MEDICINE

## 2024-11-04 PROCEDURE — 80053 COMPREHEN METABOLIC PANEL: CPT | Performed by: PHYSICIAN ASSISTANT

## 2024-11-04 PROCEDURE — C1889 IMPLANT/INSERT DEVICE, NOC: HCPCS

## 2024-11-04 PROCEDURE — C2617 STENT, NON-COR, TEM W/O DEL: HCPCS

## 2024-11-04 PROCEDURE — 0F798DZ DILATION OF COMMON BILE DUCT WITH INTRALUMINAL DEVICE, VIA NATURAL OR ARTIFICIAL OPENING ENDOSCOPIC: ICD-10-PCS | Performed by: INTERNAL MEDICINE

## 2024-11-04 PROCEDURE — 99233 SBSQ HOSP IP/OBS HIGH 50: CPT | Performed by: SURGERY

## 2024-11-04 PROCEDURE — 43274 ERCP DUCT STENT PLACEMENT: CPT | Performed by: INTERNAL MEDICINE

## 2024-11-04 PROCEDURE — 43264 ERCP REMOVE DUCT CALCULI: CPT | Performed by: INTERNAL MEDICINE

## 2024-11-04 RX ORDER — PANTOPRAZOLE SODIUM 40 MG/1
40 TABLET, DELAYED RELEASE ORAL
Status: DISCONTINUED | OUTPATIENT
Start: 2024-11-04 | End: 2024-11-05 | Stop reason: HOSPADM

## 2024-11-04 RX ORDER — FENTANYL CITRATE 50 UG/ML
INJECTION, SOLUTION INTRAMUSCULAR; INTRAVENOUS AS NEEDED
Status: DISCONTINUED | OUTPATIENT
Start: 2024-11-04 | End: 2024-11-04

## 2024-11-04 RX ORDER — SODIUM CHLORIDE 9 MG/ML
INJECTION, SOLUTION INTRAVENOUS CONTINUOUS PRN
Status: DISCONTINUED | OUTPATIENT
Start: 2024-11-04 | End: 2024-11-04

## 2024-11-04 RX ORDER — GLYCOPYRROLATE 0.2 MG/ML
INJECTION INTRAMUSCULAR; INTRAVENOUS AS NEEDED
Status: DISCONTINUED | OUTPATIENT
Start: 2024-11-04 | End: 2024-11-04

## 2024-11-04 RX ORDER — ONDANSETRON 2 MG/ML
INJECTION INTRAMUSCULAR; INTRAVENOUS AS NEEDED
Status: DISCONTINUED | OUTPATIENT
Start: 2024-11-04 | End: 2024-11-04

## 2024-11-04 RX ORDER — LABETALOL HYDROCHLORIDE 5 MG/ML
INJECTION, SOLUTION INTRAVENOUS AS NEEDED
Status: DISCONTINUED | OUTPATIENT
Start: 2024-11-04 | End: 2024-11-04

## 2024-11-04 RX ORDER — LIDOCAINE HYDROCHLORIDE 20 MG/ML
INJECTION, SOLUTION EPIDURAL; INFILTRATION; INTRACAUDAL; PERINEURAL AS NEEDED
Status: DISCONTINUED | OUTPATIENT
Start: 2024-11-04 | End: 2024-11-04

## 2024-11-04 RX ORDER — HYDRALAZINE HYDROCHLORIDE 20 MG/ML
10 INJECTION INTRAMUSCULAR; INTRAVENOUS EVERY 6 HOURS PRN
Status: DISCONTINUED | OUTPATIENT
Start: 2024-11-04 | End: 2024-11-05 | Stop reason: HOSPADM

## 2024-11-04 RX ORDER — INDOMETHACIN 50 MG/1
SUPPOSITORY RECTAL AS NEEDED
Status: COMPLETED | OUTPATIENT
Start: 2024-11-04 | End: 2024-11-04

## 2024-11-04 RX ORDER — EPHEDRINE SULFATE 50 MG/ML
INJECTION INTRAVENOUS AS NEEDED
Status: DISCONTINUED | OUTPATIENT
Start: 2024-11-04 | End: 2024-11-04

## 2024-11-04 RX ORDER — ROCURONIUM BROMIDE 10 MG/ML
INJECTION, SOLUTION INTRAVENOUS AS NEEDED
Status: DISCONTINUED | OUTPATIENT
Start: 2024-11-04 | End: 2024-11-04

## 2024-11-04 RX ORDER — HEPARIN SODIUM 5000 [USP'U]/ML
5000 INJECTION, SOLUTION INTRAVENOUS; SUBCUTANEOUS EVERY 8 HOURS SCHEDULED
Status: DISCONTINUED | OUTPATIENT
Start: 2024-11-04 | End: 2024-11-05

## 2024-11-04 RX ORDER — PROPOFOL 10 MG/ML
INJECTION, EMULSION INTRAVENOUS AS NEEDED
Status: DISCONTINUED | OUTPATIENT
Start: 2024-11-04 | End: 2024-11-04

## 2024-11-04 RX ADMIN — SODIUM CHLORIDE AND POTASSIUM CHLORIDE 75 ML/HR: .9; .15 SOLUTION INTRAVENOUS at 02:02

## 2024-11-04 RX ADMIN — Medication 5 MG: at 16:29

## 2024-11-04 RX ADMIN — FENTANYL CITRATE 50 MCG: 50 INJECTION INTRAMUSCULAR; INTRAVENOUS at 14:15

## 2024-11-04 RX ADMIN — ATENOLOL 25 MG: 25 TABLET ORAL at 08:09

## 2024-11-04 RX ADMIN — ROCURONIUM 40 MG: 50 INJECTION, SOLUTION INTRAVENOUS at 14:15

## 2024-11-04 RX ADMIN — IOHEXOL 13.5 ML: 300 INJECTION, SOLUTION INTRAVENOUS at 16:09

## 2024-11-04 RX ADMIN — INDOMETHACIN 100 MG: 50 SUPPOSITORY RECTAL at 14:30

## 2024-11-04 RX ADMIN — SODIUM CHLORIDE: 0.9 INJECTION, SOLUTION INTRAVENOUS at 14:09

## 2024-11-04 RX ADMIN — GLUCAGON 1 MG: KIT at 14:59

## 2024-11-04 RX ADMIN — FENTANYL CITRATE 50 MCG: 50 INJECTION INTRAMUSCULAR; INTRAVENOUS at 14:28

## 2024-11-04 RX ADMIN — SUGAMMADEX 200 MG: 100 INJECTION, SOLUTION INTRAVENOUS at 16:00

## 2024-11-04 RX ADMIN — EPHEDRINE SULFATE 10 MG: 50 INJECTION INTRAVENOUS at 15:58

## 2024-11-04 RX ADMIN — HYDRALAZINE HYDROCHLORIDE 10 MG: 20 INJECTION, SOLUTION INTRAMUSCULAR; INTRAVENOUS at 20:06

## 2024-11-04 RX ADMIN — GLYCOPYRROLATE 0.2 MG: 0.2 INJECTION, SOLUTION INTRAMUSCULAR; INTRAVENOUS at 14:30

## 2024-11-04 RX ADMIN — PROPOFOL 150 MG: 10 INJECTION, EMULSION INTRAVENOUS at 14:15

## 2024-11-04 RX ADMIN — LIDOCAINE HYDROCHLORIDE 100 MG: 20 INJECTION, SOLUTION EPIDURAL; INFILTRATION; INTRACAUDAL at 14:15

## 2024-11-04 RX ADMIN — HEPARIN SODIUM 5000 UNITS: 5000 INJECTION INTRAVENOUS; SUBCUTANEOUS at 21:06

## 2024-11-04 RX ADMIN — ONDANSETRON 4 MG: 2 INJECTION INTRAMUSCULAR; INTRAVENOUS at 16:00

## 2024-11-04 RX ADMIN — PANTOPRAZOLE SODIUM 40 MG: 40 TABLET, DELAYED RELEASE ORAL at 20:19

## 2024-11-04 NOTE — PROGRESS NOTES
Progress Note - Hospitalist   Name: Karly Helton 74 y.o. female I MRN: [id number]  Unit/Bed#: E2 -01 I Date of Admission: 11/3/2024   Date of Service: 11/4/2024 I Hospital Day: 1    Assessment & Plan  Choledocholithiasis  Patient is a 74-year-old female with past medical history significant for previous PE, with prothrombin gene mutation on chronic anticoagulation with Xarelto, as well as hypertension hyperlipidemia who presented to the ER with abdominal pain     CT scan with choledocholithiasis  Right upper quadrant ultrasound with choledocholithiasis, 2 ovoid echogenic structures within the distal common bile duct, gallbladder sludge, and hepatic steatosis.  No intrahepatic ductal dilatation.  Patient was noted to have transaminitis with , , total bilirubin 3.05, alkaline phosphatase 474  Patient was transferred to the [facility] for GI consultation  Per GI, no evidence of cholangitis: Antibiotics not required  11/4: ERCP: Patient underwent sphincterotomy of the major papilla, with stone and sludge removed.  Stent was placed in the common bile duct.  Bleeding was noted at the sphincterotomy procedure site.  Postprocedure Xarelto will be on hold until 11/7 per GI  Recommending inpatient cholecystectomy: Surgery following  Pt and her daughter wish to defer surgery this admission to follow up with a surgeon at their home town, recommended by her PCP  Pt will need fu ERCP for stent removal after cholecystectomy  Prothrombin gene mutation (HCC)  History of PE maintained outpatient on Xarelto  Hold Xarelto preoperatively: Will continue to hold till 11/7 due to oozing with sphincterotomy  Essential hypertension  Continue home atenolol 25 mg daily  Blood pressure likely accelerated by pain  Will add hydralazine..  Mixed hyperlipidemia  History of hyperlipidemia maintained outpatient on pravastatin, Repatha and Zetia, hold in the setting of elevated LFTs  Screening for colon cancer  Patient  "has follow-up colonoscopy scheduled in 2 weeks: She discussed with GI and they recommended postponing until she recovers from this acute event and has had follow-up cholecystectomy and stent removal  Abnormal CT scan  CT scan revealed the incidental finding of \"enlarged endometrium\" measured at 7 mm  Will refer for outpatient GYN follow-up    VTE Pharmacologic Prophylaxis: VTE Score: 13 High Risk (Score >/= 5) - Pharmacological DVT Prophylaxis Ordered: heparin. Sequential Compression Devices Ordered.    Mobility:   Basic Mobility Inpatient Raw Score: 23  JH-HLM Goal: 7: Walk 25 feet or more  JH-HLM Achieved: 8: Walk 250 feet ot more  JH-HLM Goal achieved. Continue to encourage appropriate mobility.    Patient Centered Rounds: I performed bedside rounds with nursing staff today.   Discussions with Specialists or Other Care Team Provider: [facility]: dr Chun:  liquid diet:  hold xarelto until 11/7.  Inpt ira recommended    [facility]: dr Jason:  ok for heparin sq for dvt prophy    Education and Discussions with Family / Patient:   Updated daughter Jen Helton via phone:  reviewed procedure, and tx plan    Current Length of Stay: 1 day(s)  Current Patient Status: Inpatient   Certification Statement: The patient will continue to require additional inpatient hospital stay due to s/p ERCP with oozing  Discharge Plan: Anticipate discharge in 24-48 hrs to home.    Code Status: Level 1 - Full Code    Subjective   Patient is examined and interviewed postprocedure.  She denies any current pain, abdominal cramping.  She notes she tolerated the orange sherbet however had some mild nausea and did not tolerate other liquids yet.  No vomiting or diarrhea.  She denies any chest pain.  Denies any shortness of breath.  Denies any dizziness or lightheadedness.  Denies any other complaints.  No bruising or bleeding    Objective :  Temp:  [97.6 °F (36.4 °C)-98.6 °F (37 °C)] 97.6 °F (36.4 °C)  HR:  [54-69] 57  BP: (140-188)/(65-89) " 183/80  Resp:  [18] 18  SpO2:  [92 %-99 %] 92 %  O2 Device: None (Room air)    There is no height or weight on file to calculate BMI.     Input and Output Summary (last 24 hours):     Intake/Output Summary (Last 24 hours) at 11/4/2024 1837  Last data filed at 11/4/2024 1603  Gross per 24 hour   Intake 900 ml   Output --   Net 900 ml       Physical Exam  General: Very pleasant female.  No acute distress.  Nontachypneic and nondyspneic  Heart: Regular rate and rhythm.  S1-S2 present.  No murmur, rub, gallop  Lungs: Clear to auscultation bilaterally.  Good air movement.  No wheezes, crackles, rhonchi.  No accessory muscle use or respiratory distress  Abdomen: Soft, nontender, nondistended, normoactive bowel sounds present.  No guarding or rebound.  No peritoneal signs or mass  Extremities: No clubbing, cyanosis, edema.  2+ pedal pulses  Skin: Warm and dry.  No petechiae, purpura, rash: Abdomen, trunk, and back examined: No ecchymoses.     Lines/Drains:              Lab Results: I have reviewed the following results:   Results from last 7 days   Lab Units 11/04/24  0556   WBC Thousand/uL 4.88   HEMOGLOBIN g/dL 13.0   HEMATOCRIT % 39.1   PLATELETS Thousands/uL 209   SEGS PCT % 59   LYMPHO PCT % 26   MONO PCT % 11   EOS PCT % 3     Results from last 7 days   Lab Units 11/04/24  0556   SODIUM mmol/L 141   POTASSIUM mmol/L 4.1   CHLORIDE mmol/L 109*   CO2 mmol/L 26   BUN mg/dL 10   CREATININE mg/dL 0.82   ANION GAP mmol/L 6   CALCIUM mg/dL 9.5   ALBUMIN g/dL 3.5   TOTAL BILIRUBIN mg/dL 1.08*   ALK PHOS U/L 347*   ALT U/L 229*   AST U/L 169*   GLUCOSE RANDOM mg/dL 87                       Recent Cultures (last 7 days):   Results from last 7 days   Lab Units 11/02/24 2047   URINE CULTURE  50,000-59,000 cfu/ml       ==========================================================  Imaging    11/2: CT abdomen/pelvis  1.  Choledocholithiasis  2.  Enlarged endometrium warranting follow up pelvic ultrasound    11/2: Right upper  quadrant ultrasound  1.  Findings consistent with choledocholithiasis  2.  Gallbladder sludge  3.  Hepatic steatosis    Procedures  11/4 ERCP  The major papilla was located near or within a diverticulum.  The common bile duct was deeply cannulated by employing a needle knife access technique. Cannulation was difficult due to impacted bile duct stone and ampulla within diverticulum.  Complete major papilla sphincterotomy was performed. Bleeding was noted at the procedure site.  Contrast injection with occlusion was performed successfully.  I personally interpreted cholangiogram images.  Multiple sweeps were performed in the proximal common bile duct. Sludge and stones were removed.  One 10 Fr x 7 cm double pigtail stent was placed in the common bile duct  Cannulation of the pancreatic duct was not attempted.  Indomethacin 100mg WI was administered for prophylaxis of post-ERCP pancreatitis.      Microbiology  11/2: Urine culture: Mixed contaminants      ==========================================================    Last 24 Hours Medication List:     Current Facility-Administered Medications:     acetaminophen (TYLENOL) tablet 650 mg, Q6H PRN    atenolol (TENORMIN) tablet 25 mg, QAM    calcium carbonate (TUMS) chewable tablet 1,000 mg, TID PRN    HYDROmorphone (DILAUDID) injection 0.2 mg, Q4H PRN    ondansetron (ZOFRAN) injection 4 mg, Q6H PRN    oxyCODONE (ROXICODONE) IR tablet 5 mg, Q6H PRN    oxyCODONE (ROXICODONE) split tablet 2.5 mg, Q6H PRN    [Held by provider] rivaroxaban (XARELTO) tablet 10 mg, Daily With Dinner    sodium chloride 0.9 % with KCl 20 mEq/L infusion (premix), Continuous, Last Rate: Stopped (11/04/24 8920)    Administrative Statements   Today, Patient Was Seen By: Rhea Infante MD      **Please Note: This note may have been constructed using a voice recognition system.**

## 2024-11-04 NOTE — QUICK NOTE
GASTROENTEROLOGY QUICK NOTE:    Patient seen in formal consultation this weekend.  Patient transferred from [facility] after presenting with abdominal pain and nausea/vomiting.  Labs revealed acute elevation in total bilirubin up to 3.05.  CT imaging also showed 12 mm CBD with presence of choledocholithiasis.  Patient transferred to [facility] for advanced endoscopy.  Patient now currently being monitored off antibiotics and remains hemodynamically stable.  LFTs improving. Plan for ERCP today this afternoon.  Continue to keep patient n.p.o. and hold Xarelto.  General surgery consulted and appreciate recommendations.  Reach out to GI team with any further questions or concerns.  Thank you.    Donna Jason DO, PGY-5  [facility] Gastroenterology Fellow

## 2024-11-04 NOTE — ASSESSMENT & PLAN NOTE
Patient is a 74-year-old female with past medical history significant for previous PE, with prothrombin gene mutation on chronic anticoagulation with Xarelto, as well as hypertension hyperlipidemia who presented to the ER with abdominal pain     CT scan with choledocholithiasis  Right upper quadrant ultrasound with choledocholithiasis, 2 ovoid echogenic structures within the distal common bile duct, gallbladder sludge, and hepatic steatosis.  No intrahepatic ductal dilatation.  Patient was noted to have transaminitis with , , total bilirubin 3.05, alkaline phosphatase 474  Patient was transferred to the [facility] for GI consultation  Per GI, no evidence of cholangitis: Antibiotics not required  11/4: ERCP: Patient underwent sphincterotomy of the major papilla, with stone and sludge removed.  Stent was placed in the common bile duct.  Bleeding was noted at the sphincterotomy procedure site.  Postprocedure Xarelto will be on hold until 11/7 per GI  Recommending inpatient cholecystectomy: Surgery following  Pt and her daughter wish to defer surgery this admission to follow up with a surgeon at their home town, recommended by her PCP  Pt will need fu ERCP for stent removal after cholecystectomy

## 2024-11-04 NOTE — ANESTHESIA POSTPROCEDURE EVALUATION
Post-Op Assessment Note    CV Status:  Stable  Pain Score: 0    Pain management: adequate       Mental Status:  Sleepy and arousable   Hydration Status:  Stable and euvolemic   PONV Controlled:  None   Airway Patency:  Patent and adequate     Post Op Vitals Reviewed: Yes    No anethesia notable event occurred.    Staff: CRNA           Last Filed PACU Vitals:  Vitals Value Taken Time   Temp 97.6 °F (36.4 °C) 11/04/24 1615   Pulse 69 11/04/24 1615   /85 11/04/24 1615   Resp 18 11/04/24 1615   SpO2 100 % 11/04/24 1615       Modified Uvaldo:  Activity: 2 (11/4/2024  1:30 PM)  Respiration: 2 (11/4/2024  1:30 PM)  Circulation: 2 (11/4/2024  1:30 PM)  Consciousness: 2 (11/4/2024  1:30 PM)  Oxygen Saturation: 2 (11/4/2024  1:30 PM)  Modified Uvaldo Score: 10 (11/4/2024  1:30 PM)

## 2024-11-04 NOTE — ASSESSMENT & PLAN NOTE
No sign of acute cholecystitis or pancreatitis  Benign abdominal exam  Consider outpatient discussion about elective cholecystectomy to prevent further recurrence vs nonop management

## 2024-11-04 NOTE — ASSESSMENT & PLAN NOTE
Continue home atenolol 25 mg daily  Blood pressure likely accelerated by pain  Will add hydralazine..

## 2024-11-04 NOTE — PLAN OF CARE
Problem: PAIN - ADULT  Goal: Verbalizes/displays adequate comfort level or baseline comfort level  Description: Interventions:  - Encourage patient to monitor pain and request assistance  - Assess pain using appropriate pain scale  - Administer analgesics based on type and severity of pain and evaluate response  - Implement non-pharmacological measures as appropriate and evaluate response  - Consider cultural and social influences on pain and pain management  - Notify physician/advanced practitioner if interventions unsuccessful or patient reports new pain  Outcome: Progressing     Problem: SAFETY ADULT  Goal: Patient will remain free of falls  Description: INTERVENTIONS:  - Educate patient/family on patient safety including physical limitations  - Instruct patient to call for assistance with activity   - Consult OT/PT to assist with strengthening/mobility   - Keep Call bell within reach  - Keep bed low and locked with side rails adjusted as appropriate  - Keep care items and personal belongings within reach  - Initiate and maintain comfort rounds  - Make Fall Risk Sign visible to staff  - Offer Toileting every 2 Hours, in advance of need  - Initiate/Maintain bed alarm  - Obtain necessary fall risk management equipment  - Apply yellow socks and bracelet for high fall risk patients  - Consider moving patient to room near nurses station  Outcome: Progressing  Goal: Maintain or return to baseline ADL function  Description: INTERVENTIONS:  -  Assess patient's ability to carry out ADLs; assess patient's baseline for ADL function and identify physical deficits which impact ability to perform ADLs (bathing, care of mouth/teeth, toileting, grooming, dressing, etc.)  - Assess/evaluate cause of self-care deficits   - Assess range of motion  - Assess patient's mobility; develop plan if impaired  - Assess patient's need for assistive devices and provide as appropriate  - Encourage maximum independence but intervene and  supervise when necessary  - Involve family in performance of ADLs  - Assess for home care needs following discharge   - Consider OT consult to assist with ADL evaluation and planning for discharge  - Provide patient education as appropriate  Outcome: Progressing  Goal: Maintains/Returns to pre admission functional level  Description: INTERVENTIONS:  - Perform AM-PAC 6 Click Basic Mobility/ Daily Activity assessment daily.  - Set and communicate daily mobility goal to care team and patient/family/caregiver.   - Collaborate with rehabilitation services on mobility goals if consulted  - Perform Range of Motion 2 times a day.  - Reposition patient every 2 hours.  - Dangle patient 2 times a day  - Stand patient 2 times a day  - Ambulate patient 2 times a day  - Out of bed to chair 2 times a day   - Out of bed for meals 2 times a day  - Out of bed for toileting  - Record patient progress and toleration of activity level   Outcome: Progressing     Problem: DISCHARGE PLANNING  Goal: Discharge to home or other facility with appropriate resources  Description: INTERVENTIONS:  - Identify barriers to discharge w/patient and caregiver  - Arrange for needed discharge resources and transportation as appropriate  - Identify discharge learning needs (meds, wound care, etc.)  - Arrange for interpretive services to assist at discharge as needed  - Refer to Case Management Department for coordinating discharge planning if the patient needs post-hospital services based on physician/advanced practitioner order or complex needs related to functional status, cognitive ability, or social support system  Outcome: Progressing     Problem: Knowledge Deficit  Goal: Patient/family/caregiver demonstrates understanding of disease process, treatment plan, medications, and discharge instructions  Description: Complete learning assessment and assess knowledge base.  Interventions:  - Provide teaching at level of understanding  - Provide teaching via  preferred learning methods  Outcome: Progressing     Problem: GASTROINTESTINAL - ADULT  Goal: Minimal or absence of nausea and/or vomiting  Description: INTERVENTIONS:  - Administer IV fluids if ordered to ensure adequate hydration  - Maintain NPO status until nausea and vomiting are resolved  - Nasogastric tube if ordered  - Administer ordered antiemetic medications as needed  - Provide nonpharmacologic comfort measures as appropriate  - Advance diet as tolerated, if ordered  - Consider nutrition services referral to assist patient with adequate nutrition and appropriate food choices  Outcome: Progressing  Goal: Maintains or returns to baseline bowel function  Description: INTERVENTIONS:  - Assess bowel function  - Encourage oral fluids to ensure adequate hydration  - Administer IV fluids if ordered to ensure adequate hydration  - Administer ordered medications as needed  - Encourage mobilization and activity  - Consider nutritional services referral to assist patient with adequate nutrition and appropriate food choices  Outcome: Progressing  Goal: Maintains adequate nutritional intake  Description: INTERVENTIONS:  - Monitor percentage of each meal consumed  - Identify factors contributing to decreased intake, treat as appropriate  - Assist with meals as needed  - Monitor I&O, weight, and lab values if indicated  - Obtain nutrition services referral as needed  Outcome: Progressing  Goal: Establish and maintain optimal ostomy function  Description: INTERVENTIONS:  - Assess bowel function  - Encourage oral fluids to ensure adequate hydration  - Administer IV fluids if ordered to ensure adequate hydration   - Administer ordered medications as needed  - Encourage mobilization and activity  - Nutrition services referral to assist patient with appropriate food choices  - Assess stoma site  - Consider wound care consult   Outcome: Progressing  Goal: Oral mucous membranes remain intact  Description: INTERVENTIONS  - Assess  oral mucosa and hygiene practices  - Implement preventative oral hygiene regimen  - Implement oral medicated treatments as ordered  - Initiate Nutrition services referral as needed  Outcome: Progressing

## 2024-11-04 NOTE — PROGRESS NOTES
Progress Note - Surgery-General   Name: Karly Helton 74 y.o. female I MRN: [id number]  Unit/Bed#: E2 -01 I Date of Admission: 11/3/2024   Date of Service: 11/3/2024 I Hospital Day: 0    Assessment & Plan  Choledocholithiasis  No sign of acute cholecystitis or pancreatitis  Benign abdominal exam  Consider outpatient discussion about elective cholecystectomy to prevent further recurrence vs nonop management  Prothrombin gene mutation (HCC)    Essential hypertension    Mixed hyperlipidemia    Screening for colon cancer      Subjective   Feels well.  Abdominal pain improved.  Eating.  Normal bowel function.  Moving at baseline    Objective :  Temp:  [97.1 °F (36.2 °C)-99.7 °F (37.6 °C)] 98.6 °F (37 °C)  HR:  [55-76] 59  BP: (112-168)/(60-82) 168/72  Resp:  [16-20] 18  SpO2:  [95 %-98 %] 96 %  O2 Device: None (Room air)    I/O         11/02 0701 11/03 0700 11/03 0701  11/04 0700    P.O.  240    Total Intake  240    Net  +240                  Physical Exam  Constitutional:       Appearance: Normal appearance.   HENT:      Head: Normocephalic and atraumatic.      Mouth/Throat:      Mouth: Mucous membranes are moist.   Eyes:      Extraocular Movements: Extraocular movements intact.   Cardiovascular:      Rate and Rhythm: Normal rate and regular rhythm.   Pulmonary:      Effort: Pulmonary effort is normal.   Abdominal:      General: Abdomen is flat.      Palpations: Abdomen is soft.   Musculoskeletal:         General: Normal range of motion.      Cervical back: Normal range of motion and neck supple.   Skin:     General: Skin is warm and dry.   Neurological:      General: No focal deficit present.      Mental Status: She is alert and oriented to person, place, and time.

## 2024-11-04 NOTE — CASE MANAGEMENT
Case Management Assessment & Discharge Planning Note    Patient name Karly Helton  Location East 2 /E2 -* MRN [id number]  : 1950 Date 2024       Current Admission Date: 11/3/2024  Current Admission Diagnosis:Choledocholithiasis   Patient Active Problem List    Diagnosis Date Noted Date Diagnosed    Choledocholithiasis 2024     Screening for colon cancer 2024     Closed trimalleolar fracture of left ankle with routine healing, subsequent encounter 05/10/2024     Closed fracture of left ankle with routine healing 2024     Prothrombin gene mutation (HCC) 2024     Essential hypertension 2024     Mixed hyperlipidemia 2024       LOS (days): 1  Geometric Mean LOS (GMLOS) (days): 3  Days to GMLOS:1.7     OBJECTIVE:    Risk of Unplanned Readmission Score: 7.64         Current admission status: Inpatient     Preferred Pharmacy:   [facility]/pharmacy #[id number] - [place], [place] - [address] [place] [address]  Phone: [phone] Fax: [phone]    Primary Care Provider: Eunice Holliday MD    Primary Insurance: [employer]  Secondary Insurance:     ASSESSMENT:  Active Health Care Proxies       jaquan helton Health Care Agent - Child   Primary Phone: [phone] (Mobile)                 Advance Directives  Does patient have a Health Care POA?: Yes  Does patient have Advance Directives?: Yes  Advance Directives: Living will, Power of  for health care  Primary Contact: Jaquan Helton (daughter) [phone]       Readmission Root Cause  30 Day Readmission: No    Patient Information  Admitted from:: Home  Mental Status: Alert  During Assessment patient was accompanied by: Daughter  Assessment information provided by:: Daughter  Primary Caregiver: Self  Support Systems: Spouse/significant other, Daughter  County of Residence: [facility]  What city do you live in?: [place]  Home entry access options. Select all that apply.:  Stairs  Number of steps to enter home.: 3 (garage)  Do the steps have railings?: Yes  Type of Current Residence: [facility]  Living Arrangements: Lives w/ Spouse/significant other  Is patient a ?: No    Activities of Daily Living Prior to Admission  Functional Status: Independent  Completes ADLs independently?: Yes  Ambulates independently?: Yes  Does patient use assisted devices?: Yes  Assisted Devices (DME) used: Walker, Shower Chair  Does patient currently own DME?: Yes  What DME does the patient currently own?: Walker, Shower Chair  Does patient have a history of Outpatient Therapy (PT/OT)?: Yes (KERRY Aleman)  Does the patient have a history of Short-Term Rehab?: No  Does patient have a history of HHC?: No  Does patient currently have HHC?: No       Patient Information Continued  Income Source: SSI/SSD  Does patient have prescription coverage?: Yes  Does patient receive dialysis treatments?: No  Does patient have a history of substance abuse?: No  Does patient have a history of Mental Health Diagnosis?: No       Means of Transportation  Means of Transport to [facility]:: Family transport      Social Determinants of Health (SDOH)      Flowsheet Row Most Recent Value   Housing Stability    In the last 12 months, was there a time when you were not able to pay the mortgage or rent on time? N   In the past 12 months, how many times have you moved where you were living? 0   At any time in the past 12 months, were you homeless or living in a shelter (including now)? N   Transportation Needs    In the past 12 months, has lack of transportation kept you from medical appointments or from getting medications? no   In the past 12 months, has lack of transportation kept you from meetings, work, or from getting things needed for daily living? No   Food Insecurity    Within the past 12 months, you worried that your food would run out before you got the money to buy more. Never true   Within the past 12 months, the food you bought  just didn't last and you didn't have money to get more. Never true   Utilities    In the past 12 months has the electric, gas, oil, or water company threatened to shut off services in your home? No            DISCHARGE DETAILS:    Discharge planning discussed with:: daughter Jen  Freedom of Choice: Yes  Comments - Freedom of Choice: Home with OP follow up  CM contacted family/caregiver?: Yes (Jen at the bedside)        Were patient/caregiver advised of the risks associated with not following Treatment Team discharge recommendations?: Yes    Contacts  Patient Contacts: Jen - Daughter  Relationship to Patient:: Family  Contact Method: In Person  Phone Number: [phone]  Reason/Outcome: Continuity of Care, Discharge Planning, Emergency Contact    Requested Home Health Care         Is the patient interested in HHC at discharge?: No    DME Referral Provided  Referral made for DME?: No         Would you like to participate in our [facility] service program?  : No - Declined    Treatment Team Recommendation: Home  Discharge Destination Plan:: Home         Additional Comments: CM met with the patient's daughter at the bedside while the patient was off the unit for surgery. CM introduced self and reviewed role. Daughter denies patient has any discharge needs and does not think she will need HHC. CM will follow up in the morning for continued discharge planning with the patient.

## 2024-11-04 NOTE — ASSESSMENT & PLAN NOTE
History of PE maintained outpatient on Xarelto  Hold Xarelto preoperatively: Will continue to hold till 11/7 due to oozing with sphincterotomy

## 2024-11-04 NOTE — UTILIZATION REVIEW
Initial Clinical Review    Admission: Date/Time/Statement:   Admission Orders (From admission, onward)       Ordered        11/03/24 1122  INPATIENT ADMISSION  Once                          Orders Placed This Encounter   Procedures    INPATIENT ADMISSION     Standing Status:   Standing     Number of Occurrences:   1     Order Specific Question:   Level of Care     Answer:   Med Surg [16]     Order Specific Question:   Estimated length of stay     Answer:   More than 2 Midnights     Order Specific Question:   Certification     Answer:   I certify that inpatient services are medically necessary for this patient for a duration of greater than two midnights. See H&P and MD Progress Notes for additional information about the patient's course of treatment.       Initial Presentation: 74 y.o. female transferred from [facility] to [facility] for choledocholithiasis.  H/O htn, HLd, prothrombin gene mutation.  Presented to the ED for abdominal pain.  Ct shows choledocholithiasis. , , T/bili 3.05. Started on IV fluids.  GI consult, surgery consult. Plan for ERCP tomorrow.     Anticipated Length of Stay/Certification Statement: Anticipated Length of Stay: Patient will be admitted on an inpatient basis with an anticipated length of stay of greater than 2 midnights secondary to choledocholithiasis.       Date: 11/3   Day 2:  Continue with IV fluids.  Trend lfts.   GI consult:Choledocholithiasis with elevated liver tests.No fevers or leukocytosis. Will plan for ERCP tomorrow. Hold Xarelto . Monitor off abx.      Gen/surg:  Pending ERCP.  Workup for endometrial thickening pending. Consult GYN oncol  Date: 11/4  Day 3: Has surpassed a 2nd midnight with active treatments and services. Initially admitted to inpatient for choledocholithiasis. .  Continues with Iv fluids, holding xarelto.  Pending ERCP.       Scheduled Medications:  atenolol, 25 mg, Oral, QAM  [Held by provider] rivaroxaban, 10 mg, Oral,  Daily With Dinner      Continuous IV Infusions:  sodium chloride 0.9 % with KCl 20 mEq/L, 75 mL/hr, Intravenous, Continuous      PRN Meds:  acetaminophen, 650 mg, Oral, Q6H PRN  calcium carbonate, 1,000 mg, Oral, TID PRN  HYDROmorphone, 0.2 mg, Intravenous, Q4H PRN  ondansetron, 4 mg, Intravenous, Q6H PRN  oxyCODONE, 5 mg, Oral, Q6H PRN  oxyCODONE, 2.5 mg, Oral, Q6H PRN      ED Triage Vitals   Temperature Pulse Respirations Blood Pressure SpO2 Pain Score   11/03/24 0922 11/03/24 0922 11/03/24 0922 11/03/24 0922 11/03/24 0922 11/03/24 1020   99.7 °F (37.6 °C) 58 20 161/82 96 % 2     Weight (last 2 days)       None            Vital Signs (last 3 days)       Date/Time Temp Pulse Resp BP MAP (mmHg) SpO2 O2 Device Patient Position - Orthostatic VS Dairn Coma Scale Score Pain    11/04/24 0809 -- 67 -- -- -- -- -- -- -- --    11/04/24 0805 98.6 °F (37 °C) 54 18 174/80 115 95 % None (Room air) Lying -- No Pain    11/03/24 2117 98.6 °F (37 °C) 59 18 168/72 -- 96 % None (Room air) Lying -- --    11/03/24 1925 -- -- -- -- -- -- -- -- 15 No Pain    11/03/24 1500 99.7 °F (37.6 °C) 59 18 157/74 -- 97 % None (Room air) Lying -- --    11/03/24 1020 -- -- -- -- -- -- -- -- -- 2    11/03/24 0922 99.7 °F (37.6 °C) 58 20 161/82 -- 96 % None (Room air) Lying -- --              Pertinent Labs/Diagnostic Test Results:   Radiology:  FL ERCP biliary only    (Results Pending)       Results from last 7 days   Lab Units 11/04/24  0556 11/02/24  1909   WBC Thousand/uL 4.88 6.24   HEMOGLOBIN g/dL 13.0 13.7   HEMATOCRIT % 39.1 41.0   PLATELETS Thousands/uL 209 212   TOTAL NEUT ABS Thousands/µL 2.96 4.22         Results from last 7 days   Lab Units 11/04/24  0556 11/02/24  1909   SODIUM mmol/L 141 138   POTASSIUM mmol/L 4.1 3.4*   CHLORIDE mmol/L 109* 101   CO2 mmol/L 26 28   ANION GAP mmol/L 6 9   BUN mg/dL 10 16   CREATININE mg/dL 0.82 1.04   EGFR ml/min/1.73sq m 70 52   CALCIUM mg/dL 9.5 9.9     Results from last 7 days   Lab Units  11/04/24  0556 11/02/24  1909   AST U/L 169* 418*   ALT U/L 229* 304*   ALK PHOS U/L 347* 474*   TOTAL PROTEIN g/dL 6.6 7.7   ALBUMIN g/dL 3.5 4.2   TOTAL BILIRUBIN mg/dL 1.08* 3.05*         Results from last 7 days   Lab Units 11/04/24  0556 11/02/24  1909   GLUCOSE RANDOM mg/dL 87 117           Results from last 7 days   Lab Units 11/02/24  1909   LIPASE u/L 38       Results from last 7 days   Lab Units 11/02/24  2047   CLARITY UA  Clear   COLOR UA  Yellow   SPEC GRAV UA  1.010   PH UA  7.0   GLUCOSE UA mg/dl Negative   KETONES UA mg/dl 15 (1+)*   BLOOD UA  Negative   PROTEIN UA mg/dl Negative   NITRITE UA  Negative   BILIRUBIN UA  Moderate*   UROBILINOGEN UA E.U./dl >=8.0*   LEUKOCYTES UA  Moderate*   WBC UA /hpf 10-20*   RBC UA /hpf 0-1   BACTERIA UA /hpf Moderate*   EPITHELIAL CELLS WET PREP /hpf Occasional   MUCUS THREADS  Occasional*           Results from last 7 days   Lab Units 11/02/24 2047   URINE CULTURE  50,000-59,000 cfu/ml     Past Medical History:   Diagnosis Date    Bleeding disorder (HCC) 4/3/2017    Blood clotting disorder (HCC)     History of pulmonary embolus (PE)     Hypertension     Osteoarthritis 7/6/2017     Present on Admission:   Prothrombin gene mutation (HCC)   Essential hypertension   Mixed hyperlipidemia      Admitting Diagnosis: Choledocholithiasis [K80.50]  Age/Sex: 74 y.o. female    Network Utilization Review Department  ATTENTION: Please call with any questions or concerns to [phone] and carefully listen to the prompts so that you are directed to the right person. All voicemails are confidential.   For Discharge needs, contact Care Management DC Support Team at [phone] opt. 2  Send all requests for admission clinical reviews, approved or denied determinations and any other requests to dedicated fax number below belonging to the campus where the patient is receiving treatment. List of dedicated fax numbers for the Facilities:  FACILITY NAME UR FAX NUMBER   ADMISSION  DENIALS (Administrative/Medical Necessity) [phone]   DISCHARGE SUPPORT TEAM (NETWORK) [phone]   PARENT CHILD HEALTH (Maternity/NICU/Pediatrics) [phone]   [facility] [phone]   [facility] [phone]   [facility] [phone]   [facility] [phone]   [facility] [phone]   [facility] [phone]   [facility] [phone]   [facility] [phone]   [facility] [phone]   [facility] [phone]   [facility] [phone]   [facility] [phone]

## 2024-11-04 NOTE — ANESTHESIA PREPROCEDURE EVALUATION
Procedure:  ERCP    Relevant Problems   CARDIO   (+) Essential hypertension   (+) Mixed hyperlipidemia      Other   (+) Prothrombin gene mutation (HCC)        Physical Exam    Airway    Mallampati score: II  TM Distance: >3 FB  Neck ROM: full     Dental   No notable dental hx     Cardiovascular  Rhythm: regular, Rate: normal, Cardiovascular exam normal    Pulmonary  Pulmonary exam normal Breath sounds clear to auscultation    Other Findings  post-pubertal.      Anesthesia Plan  ASA Score- 2     Anesthesia Type- general with ASA Monitors.         Additional Monitors:     Airway Plan: ETT.           Plan Factors-    Chart reviewed.   Existing labs reviewed. Patient summary reviewed.                  Induction- intravenous.    Postoperative Plan-     Perioperative Resuscitation Plan - Level 1 - Full Code.       Informed Consent- Anesthetic plan and risks discussed with patient.  I personally reviewed this patient with the CRNA. Discussed and agreed on the Anesthesia Plan with the CRNA..

## 2024-11-05 VITALS
HEART RATE: 58 BPM | RESPIRATION RATE: 18 BRPM | OXYGEN SATURATION: 96 % | DIASTOLIC BLOOD PRESSURE: 56 MMHG | TEMPERATURE: 98.1 F | SYSTOLIC BLOOD PRESSURE: 107 MMHG

## 2024-11-05 LAB
ALBUMIN SERPL BCG-MCNC: 3.4 G/DL (ref 3.5–5)
ALP SERPL-CCNC: 417 U/L (ref 34–104)
ALT SERPL W P-5'-P-CCNC: 188 U/L (ref 7–52)
ANION GAP SERPL CALCULATED.3IONS-SCNC: 8 MMOL/L (ref 4–13)
AST SERPL W P-5'-P-CCNC: 114 U/L (ref 13–39)
BASOPHILS # BLD AUTO: 0.03 THOUSANDS/ΜL (ref 0–0.1)
BASOPHILS NFR BLD AUTO: 0 % (ref 0–1)
BILIRUB DIRECT SERPL-MCNC: 0.41 MG/DL (ref 0–0.2)
BILIRUB SERPL-MCNC: 1.11 MG/DL (ref 0.2–1)
BUN SERPL-MCNC: 11 MG/DL (ref 5–25)
CALCIUM SERPL-MCNC: 9.4 MG/DL (ref 8.4–10.2)
CHLORIDE SERPL-SCNC: 107 MMOL/L (ref 96–108)
CO2 SERPL-SCNC: 25 MMOL/L (ref 21–32)
CREAT SERPL-MCNC: 0.77 MG/DL (ref 0.6–1.3)
EOSINOPHIL # BLD AUTO: 0.08 THOUSAND/ΜL (ref 0–0.61)
EOSINOPHIL NFR BLD AUTO: 1 % (ref 0–6)
ERYTHROCYTE [DISTWIDTH] IN BLOOD BY AUTOMATED COUNT: 12.1 % (ref 11.6–15.1)
GFR SERPL CREATININE-BSD FRML MDRD: 75 ML/MIN/1.73SQ M
GLUCOSE SERPL-MCNC: 85 MG/DL (ref 65–140)
HCT VFR BLD AUTO: 37.2 % (ref 34.8–46.1)
HGB BLD-MCNC: 12.5 G/DL (ref 11.5–15.4)
IMM GRANULOCYTES # BLD AUTO: 0.02 THOUSAND/UL (ref 0–0.2)
IMM GRANULOCYTES NFR BLD AUTO: 0 % (ref 0–2)
INR PPP: 0.96 (ref 0.85–1.19)
LYMPHOCYTES # BLD AUTO: 1.54 THOUSANDS/ΜL (ref 0.6–4.47)
LYMPHOCYTES NFR BLD AUTO: 23 % (ref 14–44)
MCH RBC QN AUTO: 31 PG (ref 26.8–34.3)
MCHC RBC AUTO-ENTMCNC: 33.6 G/DL (ref 31.4–37.4)
MCV RBC AUTO: 92 FL (ref 82–98)
MONOCYTES # BLD AUTO: 0.63 THOUSAND/ΜL (ref 0.17–1.22)
MONOCYTES NFR BLD AUTO: 9 % (ref 4–12)
NEUTROPHILS # BLD AUTO: 4.46 THOUSANDS/ΜL (ref 1.85–7.62)
NEUTS SEG NFR BLD AUTO: 67 % (ref 43–75)
NRBC BLD AUTO-RTO: 0 /100 WBCS
PLATELET # BLD AUTO: 221 THOUSANDS/UL (ref 149–390)
PMV BLD AUTO: 10.3 FL (ref 8.9–12.7)
POTASSIUM SERPL-SCNC: 3.6 MMOL/L (ref 3.5–5.3)
PROT SERPL-MCNC: 6.4 G/DL (ref 6.4–8.4)
PROTHROMBIN TIME: 13 SECONDS (ref 12.3–15)
RBC # BLD AUTO: 4.03 MILLION/UL (ref 3.81–5.12)
SODIUM SERPL-SCNC: 140 MMOL/L (ref 135–147)
WBC # BLD AUTO: 6.76 THOUSAND/UL (ref 4.31–10.16)

## 2024-11-05 PROCEDURE — 80048 BASIC METABOLIC PNL TOTAL CA: CPT | Performed by: INTERNAL MEDICINE

## 2024-11-05 PROCEDURE — 85610 PROTHROMBIN TIME: CPT | Performed by: INTERNAL MEDICINE

## 2024-11-05 PROCEDURE — 99239 HOSP IP/OBS DSCHRG MGMT >30: CPT | Performed by: INTERNAL MEDICINE

## 2024-11-05 PROCEDURE — 99232 SBSQ HOSP IP/OBS MODERATE 35: CPT | Performed by: INTERNAL MEDICINE

## 2024-11-05 PROCEDURE — 80076 HEPATIC FUNCTION PANEL: CPT | Performed by: INTERNAL MEDICINE

## 2024-11-05 PROCEDURE — 85025 COMPLETE CBC W/AUTO DIFF WBC: CPT | Performed by: INTERNAL MEDICINE

## 2024-11-05 RX ORDER — ENOXAPARIN SODIUM 100 MG/ML
40 INJECTION SUBCUTANEOUS
Status: DISCONTINUED | OUTPATIENT
Start: 2024-11-05 | End: 2024-11-05 | Stop reason: HOSPADM

## 2024-11-05 RX ADMIN — HEPARIN SODIUM 5000 UNITS: 5000 INJECTION INTRAVENOUS; SUBCUTANEOUS at 05:36

## 2024-11-05 RX ADMIN — PANTOPRAZOLE SODIUM 40 MG: 40 TABLET, DELAYED RELEASE ORAL at 05:36

## 2024-11-05 RX ADMIN — ENOXAPARIN SODIUM 40 MG: 40 INJECTION SUBCUTANEOUS at 09:11

## 2024-11-05 NOTE — ASSESSMENT & PLAN NOTE
"CT scan revealed the incidental finding of \"enlarged endometrium\" measured at 7 mm  Discussed with patient and her daughter Jen at the bedside: Recommended GYN follow-up as she may require endometrial sampling to rule out any abnormal cells: Patient notes she is already established with a gynecologist in her hometown and will follow-up with them  "

## 2024-11-05 NOTE — RESTORATIVE TECHNICIAN NOTE
Restorative Technician Note      Patient Name: Karly Helton     Restorative Tech Visit Date: 11/05/24  Note Type: Mobility  Patient Position Upon Consult: Supine  Activity Performed: Ambulated; Range of motion  Patient Position at End of Consult: All needs within reach; Bedside chair

## 2024-11-05 NOTE — PLAN OF CARE
Problem: PAIN - ADULT  Goal: Verbalizes/displays adequate comfort level or baseline comfort level  Description: Interventions:  - Encourage patient to monitor pain and request assistance  - Assess pain using appropriate pain scale  - Administer analgesics based on type and severity of pain and evaluate response  - Implement non-pharmacological measures as appropriate and evaluate response  - Consider cultural and social influences on pain and pain management  - Notify physician/advanced practitioner if interventions unsuccessful or patient reports new pain  Outcome: Progressing     Problem: SAFETY ADULT  Goal: Patient will remain free of falls  Description: INTERVENTIONS:  - Educate patient/family on patient safety including physical limitations  - Instruct patient to call for assistance with activity   - Consult OT/PT to assist with strengthening/mobility   - Keep Call bell within reach  - Keep bed low and locked with side rails adjusted as appropriate  - Keep care items and personal belongings within reach  - Initiate and maintain comfort rounds  - Make Fall Risk Sign visible to staff  - Offer Toileting every 2 Hours, in advance of need  - Initiate/Maintain bed alarm  - Apply yellow socks and bracelet for high fall risk patients  - Consider moving patient to room near nurses station  Outcome: Progressing     Problem: GASTROINTESTINAL - ADULT  Goal: Minimal or absence of nausea and/or vomiting  Description: INTERVENTIONS:  - Administer IV fluids if ordered to ensure adequate hydration  - Maintain NPO status until nausea and vomiting are resolved  - Nasogastric tube if ordered  - Administer ordered antiemetic medications as needed  - Provide nonpharmacologic comfort measures as appropriate  - Advance diet as tolerated, if ordered  - Consider nutrition services referral to assist patient with adequate nutrition and appropriate food choices  Outcome: Progressing  Goal: Maintains or returns to baseline bowel  function  Description: INTERVENTIONS:  - Assess bowel function  - Encourage oral fluids to ensure adequate hydration  - Administer IV fluids if ordered to ensure adequate hydration  - Administer ordered medications as needed  - Encourage mobilization and activity  - Consider nutritional services referral to assist patient with adequate nutrition and appropriate food choices  Outcome: Progressing  Goal: Maintains adequate nutritional intake  Description: INTERVENTIONS:  - Monitor percentage of each meal consumed  - Identify factors contributing to decreased intake, treat as appropriate  - Assist with meals as needed  - Monitor I&O, weight, and lab values if indicated  - Obtain nutrition services referral as needed  Outcome: Progressing  Goal: Oral mucous membranes remain intact  Description: INTERVENTIONS  - Assess oral mucosa and hygiene practices  - Implement preventative oral hygiene regimen  - Implement oral medicated treatments as ordered  - Initiate Nutrition services referral as needed  Outcome: Progressing

## 2024-11-05 NOTE — ASSESSMENT & PLAN NOTE
Continue home atenolol 25 mg daily  Blood pressure likely accelerated by pain at times  Patient be discharged to continue her home regimen and follow-up with PCP

## 2024-11-05 NOTE — PROGRESS NOTES
Progress Note - Gastroenterology   Name: Karly Helton 74 y.o. female I MRN: [id number]  Unit/Bed#: E2 -01 I Date of Admission: 11/3/2024   Date of Service: 11/5/2024 I Hospital Day: 2     Ms. Karly Helton is a 74 year old female with a PMHx of HTN, HLD, prothrombin gene mutation with history of PE on Xarelto who presented with abdominal pain and N/V secondary to choledocholithiasis for which GI is consulted.     Choledocholithiasis   Elevated Liver Chemistries   History of PE On Xarelto  Colon Cancer Screening   Assessment & Plan  Choledocholithiasis  Patient initially presented to [facility] on 11/2 with complaints of right upper quadrant abdominal pain accompanied by nausea/vomiting.  Patient hemodynamically stable on arrival and afebrile.  However, labs with an acute evaluation in LFTs. AST/ALT 14/204, , and TB 3.05. CT imaging with intraluminal density within the CBD measuring 12 mm. Follow up RUQ with 2 ovid echogenic structures within the distal CBD with largest measuring 1/1 cm. Accompanying GB sludge noted.  Patient was ultimately transferred to [facility] for further evaluation entered intervention.  Following transfer, patient remained hemodynamically stable and LFTs improved.  She ultimately underwent ERCP on 11/4/24 with sphincterotomy and removal of sludge/stones.  Double pigtail plastic stent was left in the common bile duct.  Patient tolerated the procedure well and did well post procedurally.  Patient with improvement/complete resolution in symptoms.  LFTs stable the patient is anxious for discharge home.  Did discuss at great length with patient and daughter regarding the importance of cholecystectomy in order to prevent further episodes of choledocholithiasis.  Patient adamant about to follow-up with PCP and general surgery outpatient I would like to hold off on inpatient cholecystectomy at this time.  Will ultimately require outpatient follow-up and repeat ERCP in 2 to 3 months that we will  help to arrange.    Plan:  Currently on full liquid diet, okay to advance from GI perspective  Continue to hold Xarelto until 11/7/2024, resume outpatient  Surgery consulted for cholecystectomy, plan for outpatient follow-up  Continue to monitor and trend LFTs to ensure improvement  Repeat ERCP in 2 to 3 months for stent removal; will help arrange  Additional pain and symptom management per primary team    Prothrombin gene mutation (HCC)  Patient with known history of prothrombin gene mutation.  Complicated by pulmonary embolism in the past.  Chronically anticoagulated with Xarelto, last dose on 11/2.  Recommend that this continue to be held until 11/7/2024.  Okay to resume outpatient on this date.    Screening for colon cancer  Last colonoscopy completed 11/2021 at [facility] digestive health with Dr. Ovalles. Removed 18mm cecal polyp with path showing TV adenoma. Patient scheduled for her surveillance colonoscopy in 2 weeks and asking whether or not she should postpone. Would recommend postponing until after recovery from this acute event and interventions have been performed for both choledocholithiasis and cholelithiasis    Subjective   Patient seen and evaluated bedside.  Sitting in bed comfortably no acute distress or visible discomfort.  Denies any abdominal pain.  No nausea/vomiting.  Currently tolerating full liquid diet.  Offers no complaints at this time.  Very anxious for discharge home.  Continues to adamantly refuse surgery at this time.    Objective :  Temp:  [97.6 °F (36.4 °C)-98.7 °F (37.1 °C)] 98.1 °F (36.7 °C)  HR:  [56-69] 58  BP: (107-188)/(56-89) 107/56  Resp:  [18] 18  SpO2:  [92 %-99 %] 96 %  O2 Device: None (Room air)    Physical Exam  Constitutional:       General: She is not in acute distress.     Appearance: She is normal weight. She is not ill-appearing or toxic-appearing.   HENT:      Head: Normocephalic and atraumatic.      Nose: Nose normal.   Eyes:      General: No scleral  icterus.  Cardiovascular:      Rate and Rhythm: Normal rate.      Pulses: Normal pulses.   Pulmonary:      Effort: Pulmonary effort is normal.   Abdominal:      General: Abdomen is flat. Bowel sounds are normal. There is no distension.      Tenderness: There is no abdominal tenderness.   Musculoskeletal:         General: Normal range of motion.      Cervical back: Normal range of motion.   Skin:     General: Skin is warm.      Coloration: Skin is not jaundiced or pale.   Neurological:      Mental Status: She is alert and oriented to person, place, and time.   Psychiatric:         Mood and Affect: Mood normal.         Behavior: Behavior normal.         Lab Results: I have reviewed the following results:CBC/BMP:   .     11/05/24  0536   WBC 6.76   HGB 12.5   HCT 37.2      SODIUM 140   K 3.6      CO2 25   BUN 11   CREATININE 0.77   GLUC 85    , LFTs:   .     11/05/24  0536   *   *   ALB 3.4*   TBILI 1.11*   ALKPHOS 417*        Imaging Results Review: No pertinent imaging studies reviewed.  Other Study Results Review: No additional pertinent studies reviewed.

## 2024-11-05 NOTE — INCIDENTAL FINDINGS
The following findings require follow up:  Radiographic finding   Finding: enlarge uterine lining (endometrium):  7mm on CT scan   Follow up required: see your gynecologist for check up:  she will likely due to pelvic exam/pap smear and take a sample of the uterine lining to make sure you dont have any abnormal cells or cancer cells   Follow up should be done within 2 month(s)    Please notify the following clinician to assist with the follow up:   Dr. Eunice Holliday MD    Incidental finding results were discussed with the patient and her daughter Jen by Rhea Infante MD on 11/05/24.   They expressed understanding and all questions answered.

## 2024-11-05 NOTE — ASSESSMENT & PLAN NOTE
Patient with known history of prothrombin gene mutation.  Complicated by pulmonary embolism in the past.  Chronically anticoagulated with Xarelto, last dose on 11/2.  Recommend that this continue to be held until 11/7/2024.  Okay to resume outpatient on this date.

## 2024-11-05 NOTE — DISCHARGE SUMMARY
Discharge Summary - Hospitalist   Name: Karly Helton 74 y.o. female I MRN: [id number]  Unit/Bed#: E2 -01 I Date of Admission: 11/3/2024   Date of Service: 11/5/2024 I Hospital Day: 2         Discharging Physician / Practitioner: Rhea Infante MD  PCP: Eunice Holliday MD  Admission Date:   Admission Orders (From admission, onward)       Ordered        11/03/24 1122  INPATIENT ADMISSION  Once                          Discharge Date: 11/05/24  Assessment & Plan  Choledocholithiasis  Patient is a 74-year-old female with past medical history significant for previous PE, with prothrombin gene mutation on chronic anticoagulation with Xarelto, as well as hypertension hyperlipidemia who presented to the ER with abdominal pain     CT scan with choledocholithiasis  Right upper quadrant ultrasound with choledocholithiasis, 2 ovoid echogenic structures within the distal common bile duct, gallbladder sludge, and hepatic steatosis.  No intrahepatic ductal dilatation.  Patient was noted to have transaminitis with , , total bilirubin 3.05, alkaline phosphatase 474  Patient was transferred to the [facility] for GI consultation  Per GI, no evidence of cholangitis: Antibiotics not required  11/4: ERCP: Patient underwent sphincterotomy of the major papilla, with stone and sludge removed.  Stent was placed in the common bile duct.  Bleeding was noted at the sphincterotomy procedure site.  Postprocedure Xarelto will be on hold until 11/7 per GI  Recommending inpatient cholecystectomy: Surgery following  Pt and her daughter wish to defer surgery this admission to follow up with a surgeon at their home town, recommended by her PCP  Pt will need fu ERCP for stent removal after cholecystectomy  Pt notes she will call her PCP coordinate general surgery referrals in her home town.  D/w her recommend surgery to be performed in next 1-2 weeks and to call GI office to schedule ERCP/stent removal shortly  "afterwards.  Prothrombin gene mutation (HCC)  History of PE maintained outpatient on Xarelto  Hold Xarelto preoperatively: Will continue to hold till 11/7 due to oozing with sphincterotomy  Hemoglobin stable  Pt was placed on heparin sq for dvt prophy:   will be given a dose of lovenox 40mg sq prior to dc today for dvt prophy:  d/w pt option of giving herself one dose of lovenox sq on 11/6:  she declines, but notes she will stay active  will resume her xarelto 11/7  Essential hypertension  Continue home atenolol 25 mg daily  Blood pressure likely accelerated by pain at times  Patient be discharged to continue her home regimen and follow-up with PCP  Mixed hyperlipidemia  History of hyperlipidemia maintained outpatient on pravastatin, Repatha and Zetia, hold in the setting of elevated LFTs  Patient instructed to continue to hold her cholesterol pills until she sees her family doctor in 1 week  Screening for colon cancer  Patient has follow-up colonoscopy scheduled in 2 weeks: She discussed with GI and they recommended postponing until she recovers from this acute event and has had follow-up cholecystectomy and stent removal  Abnormal CT scan  CT scan revealed the incidental finding of \"enlarged endometrium\" measured at 7 mm  Discussed with patient and her daughter Jen at the bedside: Recommended GYN follow-up as she may require endometrial sampling to rule out any abnormal cells: Patient notes she is already established with a gynecologist in her hometown and will follow-up with them     Medical Problems       Resolved Problems  Date Reviewed: 6/27/2024   None         Consultations During Hospital Stay:  Surgery :Dr. Delcid  GI: Dr. Stoner      Significant Findings / Test Results:   Imaging     11/2: CT abdomen/pelvis  1.  Choledocholithiasis  2.  Enlarged endometrium warranting follow up pelvic ultrasound     11/2: Right upper quadrant ultrasound  1.  Findings consistent with choledocholithiasis  2.  Gallbladder " sludge  3.  Hepatic steatosis     Procedures  11/4 ERCP  The major papilla was located near or within a diverticulum.  The common bile duct was deeply cannulated by employing a needle knife access technique. Cannulation was difficult due to impacted bile duct stone and ampulla within diverticulum.  Complete major papilla sphincterotomy was performed. Bleeding was noted at the procedure site.  Contrast injection with occlusion was performed successfully.  I personally interpreted cholangiogram images.  Multiple sweeps were performed in the proximal common bile duct. Sludge and stones were removed.  One 10 Fr x 7 cm double pigtail stent was placed in the common bile duct  Cannulation of the pancreatic duct was not attempted.  Indomethacin 100mg CT was administered for prophylaxis of post-ERCP pancreatitis.        Microbiology  11/2: Urine culture: Mixed contaminants       Incidental Findings:   Endometrial thickening:  pt aware and will make appt to see her primary gynecologist:     Test Results Pending at Discharge (will require follow up):   none     Outpatient Tests Requested:  Lap ira, and repeat ERCP with stent removal  GYN exam    Complications:  none    Reason for Admission: abd pain, obstructive LFTs    Hospital Course:   Karly Helton is a 74 y.o. female patient who originally presented to the hospital on 11/3/2024 due to abdominal pain and elevated LFTs.  Patient was transferred to the [facility] for GI evaluation.  Underwent ERCP with sphincterotomy and stone removal.  Laparoscopic cholecystectomy in the very near future recommended as well as repeat ERCP with stent removal.  Patient is currently doing well, pain-free, with hospital course as described above.  She prefers to have these performed as an outpatient.  Should be discharged home        Please see above list of diagnoses and related plan for additional information.     Condition at Discharge: good    Discharge Day Visit / Exam:    Subjective:    Patient relates she feels well and is eager for discharge home.  She denies any abdominal pain, cramping, or discomfort.  She denies any chest pain.  Denies any shortness of breath or cough.  Denies any dyspnea on exertion.  She denies any abdominal pain, nausea, vomiting, diarrhea.  She tolerated the solid breakfast with cream a week, yogurt, and liquids without any difficulty.  She notes she is ambulating and denies any dizziness or lightheadedness.  Denies any bleeding    Vitals: Blood Pressure: 107/56 (11/05/24 0748)  Pulse: 58 (11/05/24 0748)  Temperature: 98.1 °F (36.7 °C) (11/05/24 0748)  Temp Source: Oral (11/05/24 0748)  Respirations: 18 (11/05/24 0748)  SpO2: 96 % (11/05/24 0748)  Physical Exam   General: Very pleasant female.  No acute distress.  Nontachypneic and nondyspneic  Heart: Regular rate and rhythm.  S1-S2 present.  No murmur, rub, gallop  Lungs: Clear to auscultation bilaterally.  Good air movement.  No accessory muscle use or respiratory distress  Abdomen: Soft, nontender, nondistended, normoactive bowel sounds present.  No guarding or rebound.  No peritoneal signs or mass  Extremities: No clubbing, cyanosis, edema.  2+ pedal pulses bilaterally  Neurologic: Awake and alert.  Fluent speech.  Interactive.  Moving all 4 extremities  Skin: Warm and dry.  No petechia, purpura, rash: No ecchymoses noted on anterior abdomen, flanks, or back    Discussion with Family: Updated patient and daughter Jen Helton in detail at the bedside.  Reviewed all test results, and recommendations from specialists.  Reviewed outpatient follow-up including laparoscopic cholecystectomy in the next 1-2 weeks, ERCP with stent removal shortly after that, GYN checkup with evaluation of abnormal endometrial lining, and to defer elective colonoscopy for several weeks until this initial evaluation has been completed    Discharge instructions/Information to patient and family:   See after visit summary for  information provided to patient and family.      Provisions for Follow-Up Care:  See after visit summary for information related to follow-up care and any pertinent home health orders.      Mobility at time of Discharge:   Basic Mobility Inpatient Raw Score: 23  JH-HLM Goal: 7: Walk 25 feet or more  JH-HLM Achieved: 8: Walk 250 feet ot more  HLM Goal achieved. Continue to encourage appropriate mobility.     Disposition:   Home    Planned Readmission: no    Discharge Medications:  See after visit summary for reconciled discharge medications provided to patient and/or family.      Administrative Statements   Discharge Statement:  I have spent a total time of 50 minutes in caring for this patient on the day of the visit/encounter. .    **Please Note: This note may have been constructed using a voice recognition system**

## 2024-11-05 NOTE — ASSESSMENT & PLAN NOTE
Patient has follow-up colonoscopy scheduled in 2 weeks: She discussed with GI and they recommended postponing until she recovers from this acute event and has had follow-up cholecystectomy and stent removal

## 2024-11-05 NOTE — ASSESSMENT & PLAN NOTE
Last colonoscopy completed 11/2021 at [facility] with Dr. Ovalles. Removed 18mm cecal polyp with path showing TV adenoma. Patient scheduled for her surveillance colonoscopy in 2 weeks and asking whether or not she should postpone. Would recommend postponing until after recovery from this acute event and interventions have been performed for both choledocholithiasis and cholelithiasis

## 2024-11-05 NOTE — PLAN OF CARE
Problem: PAIN - ADULT  Goal: Verbalizes/displays adequate comfort level or baseline comfort level  Description: Interventions:  - Encourage patient to monitor pain and request assistance  - Assess pain using appropriate pain scale  - Administer analgesics based on type and severity of pain and evaluate response  - Implement non-pharmacological measures as appropriate and evaluate response  - Consider cultural and social influences on pain and pain management  - Notify physician/advanced practitioner if interventions unsuccessful or patient reports new pain  Outcome: Progressing     Problem: SAFETY ADULT  Goal: Patient will remain free of falls  Description: INTERVENTIONS:  - Educate patient/family on patient safety including physical limitations  - Instruct patient to call for assistance with activity   - Consult OT/PT to assist with strengthening/mobility   - Keep Call bell within reach  - Keep bed low and locked with side rails adjusted as appropriate  - Keep care items and personal belongings within reach  - Initiate and maintain comfort rounds  - Make Fall Risk Sign visible to staff  - Offer Toileting every 2 Hours, in advance of need  - Initiate/Maintain bed alarm  - Obtain necessary fall risk management equipment: bed alarm, call bell,  socks  - Apply yellow socks and bracelet for high fall risk patients  - Consider moving patient to room near nurses station  Outcome: Progressing  Goal: Maintain or return to baseline ADL function  Description: INTERVENTIONS:  -  Assess patient's ability to carry out ADLs; assess patient's baseline for ADL function and identify physical deficits which impact ability to perform ADLs (bathing, care of mouth/teeth, toileting, grooming, dressing, etc.)  - Assess/evaluate cause of self-care deficits   - Assess range of motion  - Assess patient's mobility; develop plan if impaired  - Assess patient's need for assistive devices and provide as appropriate  - Encourage maximum  independence but intervene and supervise when necessary  - Involve family in performance of ADLs  - Assess for home care needs following discharge   - Consider OT consult to assist with ADL evaluation and planning for discharge  - Provide patient education as appropriate  Outcome: Progressing  Goal: Maintains/Returns to pre admission functional level  Description: INTERVENTIONS:  - Perform AM-PAC 6 Click Basic Mobility/ Daily Activity assessment daily.  - Set and communicate daily mobility goal to care team and patient/family/caregiver.   - Collaborate with rehabilitation services on mobility goals if consulted  - Perform Range of Motion 3 times a day.  - Reposition patient every 2 hours.  - Dangle patient 3 times a day  - Stand patient 3 times a day  - Ambulate patient 3 times a day  - Out of bed to chair 3 times a day   - Out of bed for meals 3 times a day  - Out of bed for toileting  - Record patient progress and toleration of activity level   Outcome: Progressing     Problem: DISCHARGE PLANNING  Goal: Discharge to home or other facility with appropriate resources  Description: INTERVENTIONS:  - Identify barriers to discharge w/patient and caregiver  - Arrange for needed discharge resources and transportation as appropriate  - Identify discharge learning needs (meds, wound care, etc.)  - Arrange for interpretive services to assist at discharge as needed  - Refer to Case Management Department for coordinating discharge planning if the patient needs post-hospital services based on physician/advanced practitioner order or complex needs related to functional status, cognitive ability, or social support system  Outcome: Progressing     Problem: Knowledge Deficit  Goal: Patient/family/caregiver demonstrates understanding of disease process, treatment plan, medications, and discharge instructions  Description: Complete learning assessment and assess knowledge base.  Interventions:  - Provide teaching at level of  understanding  - Provide teaching via preferred learning methods  Outcome: Progressing     Problem: GASTROINTESTINAL - ADULT  Goal: Minimal or absence of nausea and/or vomiting  Description: INTERVENTIONS:  - Administer IV fluids if ordered to ensure adequate hydration  - Maintain NPO status until nausea and vomiting are resolved  - Nasogastric tube if ordered  - Administer ordered antiemetic medications as needed  - Provide nonpharmacologic comfort measures as appropriate  - Advance diet as tolerated, if ordered  - Consider nutrition services referral to assist patient with adequate nutrition and appropriate food choices  Outcome: Progressing  Goal: Maintains or returns to baseline bowel function  Description: INTERVENTIONS:  - Assess bowel function  - Encourage oral fluids to ensure adequate hydration  - Administer IV fluids if ordered to ensure adequate hydration  - Administer ordered medications as needed  - Encourage mobilization and activity  - Consider nutritional services referral to assist patient with adequate nutrition and appropriate food choices  Outcome: Progressing  Goal: Maintains adequate nutritional intake  Description: INTERVENTIONS:  - Monitor percentage of each meal consumed  - Identify factors contributing to decreased intake, treat as appropriate  - Assist with meals as needed  - Monitor I&O, weight, and lab values if indicated  - Obtain nutrition services referral as needed  Outcome: Progressing  Goal: Establish and maintain optimal ostomy function  Description: INTERVENTIONS:  - Assess bowel function  - Encourage oral fluids to ensure adequate hydration  - Administer IV fluids if ordered to ensure adequate hydration   - Administer ordered medications as needed  - Encourage mobilization and activity  - Nutrition services referral to assist patient with appropriate food choices  - Assess stoma site  - Consider wound care consult   Outcome: Progressing  Goal: Oral mucous membranes remain  intact  Description: INTERVENTIONS  - Assess oral mucosa and hygiene practices  - Implement preventative oral hygiene regimen  - Implement oral medicated treatments as ordered  - Initiate Nutrition services referral as needed  Outcome: Progressing

## 2024-11-05 NOTE — ASSESSMENT & PLAN NOTE
Patient is a 74-year-old female with past medical history significant for previous PE, with prothrombin gene mutation on chronic anticoagulation with Xarelto, as well as hypertension hyperlipidemia who presented to the ER with abdominal pain     CT scan with choledocholithiasis  Right upper quadrant ultrasound with choledocholithiasis, 2 ovoid echogenic structures within the distal common bile duct, gallbladder sludge, and hepatic steatosis.  No intrahepatic ductal dilatation.  Patient was noted to have transaminitis with , , total bilirubin 3.05, alkaline phosphatase 474  Patient was transferred to the [facility] for GI consultation  Per GI, no evidence of cholangitis: Antibiotics not required  11/4: ERCP: Patient underwent sphincterotomy of the major papilla, with stone and sludge removed.  Stent was placed in the common bile duct.  Bleeding was noted at the sphincterotomy procedure site.  Postprocedure Xarelto will be on hold until 11/7 per GI  Recommending inpatient cholecystectomy: Surgery following  Pt and her daughter wish to defer surgery this admission to follow up with a surgeon at their home town, recommended by her PCP  Pt will need fu ERCP for stent removal after cholecystectomy  Pt notes she will call her PCP coordinate general surgery referrals in her home town.  D/w her recommend surgery to be performed in next 1-2 weeks and to call GI office to schedule ERCP/stent removal shortly afterwards.

## 2024-11-05 NOTE — ASSESSMENT & PLAN NOTE
History of PE maintained outpatient on Xarelto  Hold Xarelto preoperatively: Will continue to hold till 11/7 due to oozing with sphincterotomy  Hemoglobin stable  Pt was placed on heparin sq for dvt prophy:   will be given a dose of lovenox 40mg sq prior to dc today for dvt prophy:  d/w pt option of giving herself one dose of lovenox sq on 11/6:  she declines, but notes she will stay active  will resume her xarelto 11/7

## 2024-11-05 NOTE — DISCHARGE INSTR - AVS FIRST PAGE
============================================================  Discharge instructions    Please be sure to stay well-hydrated and try to drink at least 6-8 large glasses of water a day    Please see your family doctor within 1 week for a checkup, and to help you coordinate with a general surgeon in your area to have the surgery for your gallbladder removal in the next 1-3 weeks    When you know the date for your gallbladder surgery please contact the GI doctor so they can schedule your ERCP with stent removal    Please also schedule an appointment to see your gynecologist to evaluate the thickened lining of your uterus: This can be done in the next 1-3 months after you have your gallbladder taken care of    Please postpone your colonoscopy for the next month or so, after you have all of your gallbladder workup, and your GYN workup completed      Please return to the ER with any problems at all, especially any worsening pain, nausea or vomiting.  Please monitor for any infections and if you have any fevers, chills or any signs of infection return immediately to the ER    Please do not start your Xarelto until 11/7    =========================================================

## 2024-11-05 NOTE — ASSESSMENT & PLAN NOTE
"CT scan revealed the incidental finding of \"enlarged endometrium\" measured at 7 mm  Will refer for outpatient GYN follow-up  "

## 2024-11-05 NOTE — ASSESSMENT & PLAN NOTE
Patient initially presented to [facility] on 11/2 with complaints of right upper quadrant abdominal pain accompanied by nausea/vomiting.  Patient hemodynamically stable on arrival and afebrile.  However, labs with an acute evaluation in LFTs. AST/ALT 14/204, , and TB 3.05. CT imaging with intraluminal density within the CBD measuring 12 mm. Follow up RUQ with 2 ovid echogenic structures within the distal CBD with largest measuring 1/1 cm. Accompanying GB sludge noted.  Patient was ultimately transferred to [facility] for further evaluation entered intervention.  Following transfer, patient remained hemodynamically stable and LFTs improved.  She ultimately underwent ERCP on 11/4/24 with sphincterotomy and removal of sludge/stones.  Double pigtail plastic stent was left in the common bile duct.  Patient tolerated the procedure well and did well post procedurally.  Patient with improvement/complete resolution in symptoms.  LFTs stable the patient is anxious for discharge home.  Did discuss at great length with patient and daughter regarding the importance of cholecystectomy in order to prevent further episodes of choledocholithiasis.  Patient adamant about to follow-up with PCP and general surgery outpatient I would like to hold off on inpatient cholecystectomy at this time.  Will ultimately require outpatient follow-up and repeat ERCP in 2 to 3 months that we will help to arrange.    Plan:  Currently on full liquid diet, okay to advance from GI perspective  Continue to hold Xarelto until 11/7/2024, resume outpatient  Surgery consulted for cholecystectomy, plan for outpatient follow-up  Continue to monitor and trend LFTs to ensure improvement  Repeat ERCP in 2 to 3 months for stent removal; will help arrange  Additional pain and symptom management per primary team

## 2024-11-06 NOTE — UTILIZATION REVIEW
NOTIFICATION OF ADMISSION DISCHARGE   This is a Notification of Discharge from [facility]. Please be advised that this patient has been discharge from our facility. Below you will find the admission and discharge date and time including the patient’s disposition.   UTILIZATION REVIEW CONTACT:  Iman Tang MA  Utilization   Network Utilization Review Department  Phone: [phone] x carefully listen to the prompts. All voicemails are confidential.  Email: [email or web address]     ADMISSION INFORMATION  PRESENTATION DATE: 11/3/2024  9:51 AM  OBERVATION ADMISSION DATE: N/A  INPATIENT ADMISSION DATE: 11/3/24  9:51 AM   DISCHARGE DATE: 11/5/2024 11:24 AM   DISPOSITION:Home/Self Care    Network Utilization Review Department  ATTENTION: Please call with any questions or concerns to [phone] and carefully listen to the prompts so that you are directed to the right person. All voicemails are confidential.   For Discharge needs, contact Care Management DC Support Team at [phone] opt. 2  Send all requests for admission clinical reviews, approved or denied determinations and any other requests to dedicated fax number below belonging to the campus where the patient is receiving treatment. List of dedicated fax numbers for the Facilities:  FACILITY NAME UR FAX NUMBER   ADMISSION DENIALS (Administrative/Medical Necessity) [phone]   DISCHARGE SUPPORT TEAM ([facility]) [phone]   PARENT CHILD HEALTH (Maternity/NICU/Pediatrics) [phone]   [facility] [phone]   [facility] [phone]   [facility] [phone]   [facility] [phone]   [facility] [phone]   [facility] [phone]   [facility] [phone]   [facility]  [phone]   [facility] [phone]   Critical access hospital [phone]   [facility] [phone]   [facility] [phone]

## 2024-12-03 PROBLEM — Z12.11 SCREENING FOR COLON CANCER: Status: RESOLVED | Noted: 2024-11-03 | Resolved: 2024-12-03

## 2025-02-05 RX ORDER — SODIUM CHLORIDE, SODIUM LACTATE, POTASSIUM CHLORIDE, CALCIUM CHLORIDE 600; 310; 30; 20 MG/100ML; MG/100ML; MG/100ML; MG/100ML
125 INJECTION, SOLUTION INTRAVENOUS CONTINUOUS
Status: CANCELLED | OUTPATIENT
Start: 2025-02-05

## 2025-02-07 ENCOUNTER — HOSPITAL ENCOUNTER (OUTPATIENT)
Dept: GASTROENTEROLOGY | Facility: HOSPITAL | Age: 75
Setting detail: OUTPATIENT SURGERY
End: 2025-02-07
Attending: INTERNAL MEDICINE
Payer: COMMERCIAL

## 2025-02-07 ENCOUNTER — ANESTHESIA EVENT (OUTPATIENT)
Dept: GASTROENTEROLOGY | Facility: HOSPITAL | Age: 75
End: 2025-02-07
Payer: COMMERCIAL

## 2025-02-07 ENCOUNTER — HOSPITAL ENCOUNTER (OUTPATIENT)
Dept: RADIOLOGY | Facility: HOSPITAL | Age: 75
Discharge: HOME | End: 2025-02-07
Payer: COMMERCIAL

## 2025-02-07 ENCOUNTER — ANESTHESIA (OUTPATIENT)
Dept: GASTROENTEROLOGY | Facility: HOSPITAL | Age: 75
End: 2025-02-07
Payer: COMMERCIAL

## 2025-02-07 VITALS
RESPIRATION RATE: 16 BRPM | DIASTOLIC BLOOD PRESSURE: 76 MMHG | BODY MASS INDEX: 33.43 KG/M2 | WEIGHT: 208 LBS | HEART RATE: 62 BPM | HEIGHT: 66 IN | SYSTOLIC BLOOD PRESSURE: 147 MMHG | TEMPERATURE: 97.9 F | OXYGEN SATURATION: 99 %

## 2025-02-07 DIAGNOSIS — K86.89 PANCREATIC DUCT DISRUPTION: ICD-10-CM

## 2025-02-07 DIAGNOSIS — K80.50 CHOLEDOCHOLITHIASIS: ICD-10-CM

## 2025-02-07 PROCEDURE — 43264 ERCP REMOVE DUCT CALCULI: CPT | Performed by: INTERNAL MEDICINE

## 2025-02-07 PROCEDURE — C1769 GUIDE WIRE: HCPCS

## 2025-02-07 PROCEDURE — 74328 X-RAY BILE DUCT ENDOSCOPY: CPT

## 2025-02-07 PROCEDURE — 43275 ERCP REMOVE FORGN BODY DUCT: CPT | Performed by: INTERNAL MEDICINE

## 2025-02-07 RX ORDER — ROCURONIUM BROMIDE 10 MG/ML
INJECTION, SOLUTION INTRAVENOUS AS NEEDED
Status: DISCONTINUED | OUTPATIENT
Start: 2025-02-07 | End: 2025-02-07

## 2025-02-07 RX ORDER — ONDANSETRON 2 MG/ML
INJECTION INTRAMUSCULAR; INTRAVENOUS AS NEEDED
Status: DISCONTINUED | OUTPATIENT
Start: 2025-02-07 | End: 2025-02-07

## 2025-02-07 RX ORDER — SODIUM CHLORIDE, SODIUM LACTATE, POTASSIUM CHLORIDE, CALCIUM CHLORIDE 600; 310; 30; 20 MG/100ML; MG/100ML; MG/100ML; MG/100ML
125 INJECTION, SOLUTION INTRAVENOUS CONTINUOUS
Status: DISCONTINUED | OUTPATIENT
Start: 2025-02-07 | End: 2025-02-11 | Stop reason: HOSPADM

## 2025-02-07 RX ORDER — LIDOCAINE HYDROCHLORIDE 20 MG/ML
INJECTION, SOLUTION EPIDURAL; INFILTRATION; INTRACAUDAL; PERINEURAL AS NEEDED
Status: DISCONTINUED | OUTPATIENT
Start: 2025-02-07 | End: 2025-02-07

## 2025-02-07 RX ORDER — PROPOFOL 10 MG/ML
INJECTION, EMULSION INTRAVENOUS AS NEEDED
Status: DISCONTINUED | OUTPATIENT
Start: 2025-02-07 | End: 2025-02-07

## 2025-02-07 RX ORDER — FENTANYL CITRATE 50 UG/ML
INJECTION, SOLUTION INTRAMUSCULAR; INTRAVENOUS AS NEEDED
Status: DISCONTINUED | OUTPATIENT
Start: 2025-02-07 | End: 2025-02-07

## 2025-02-07 RX ORDER — DEXAMETHASONE SODIUM PHOSPHATE 10 MG/ML
INJECTION, SOLUTION INTRAMUSCULAR; INTRAVENOUS AS NEEDED
Status: DISCONTINUED | OUTPATIENT
Start: 2025-02-07 | End: 2025-02-07

## 2025-02-07 RX ORDER — METOPROLOL TARTRATE 1 MG/ML
INJECTION, SOLUTION INTRAVENOUS AS NEEDED
Status: DISCONTINUED | OUTPATIENT
Start: 2025-02-07 | End: 2025-02-07

## 2025-02-07 RX ADMIN — METOROPROLOL TARTRATE 2.5 MG: 5 INJECTION, SOLUTION INTRAVENOUS at 08:41

## 2025-02-07 RX ADMIN — DEXAMETHASONE SODIUM PHOSPHATE 10 MG: 10 INJECTION INTRAMUSCULAR; INTRAVENOUS at 08:38

## 2025-02-07 RX ADMIN — PROPOFOL 150 MG: 10 INJECTION, EMULSION INTRAVENOUS at 08:32

## 2025-02-07 RX ADMIN — IOHEXOL 15 ML: 240 INJECTION, SOLUTION INTRATHECAL; INTRAVASCULAR; INTRAVENOUS; ORAL at 09:15

## 2025-02-07 RX ADMIN — METOROPROLOL TARTRATE 2.5 MG: 5 INJECTION, SOLUTION INTRAVENOUS at 08:47

## 2025-02-07 RX ADMIN — ONDANSETRON 4 MG: 2 INJECTION INTRAMUSCULAR; INTRAVENOUS at 09:09

## 2025-02-07 RX ADMIN — SUGAMMADEX 200 MG: 100 INJECTION, SOLUTION INTRAVENOUS at 09:05

## 2025-02-07 RX ADMIN — ROCURONIUM BROMIDE 30 MG: 10 INJECTION, SOLUTION INTRAVENOUS at 08:33

## 2025-02-07 RX ADMIN — ONDANSETRON 4 MG: 2 INJECTION INTRAMUSCULAR; INTRAVENOUS at 08:51

## 2025-02-07 RX ADMIN — FENTANYL CITRATE 25 MCG: 50 INJECTION, SOLUTION INTRAMUSCULAR; INTRAVENOUS at 08:32

## 2025-02-07 RX ADMIN — SODIUM CHLORIDE, SODIUM LACTATE, POTASSIUM CHLORIDE, AND CALCIUM CHLORIDE: .6; .31; .03; .02 INJECTION, SOLUTION INTRAVENOUS at 08:53

## 2025-02-07 RX ADMIN — SODIUM CHLORIDE, SODIUM LACTATE, POTASSIUM CHLORIDE, AND CALCIUM CHLORIDE 125 ML/HR: .6; .31; .03; .02 INJECTION, SOLUTION INTRAVENOUS at 08:02

## 2025-02-07 RX ADMIN — LIDOCAINE HYDROCHLORIDE 5 ML: 20 INJECTION, SOLUTION EPIDURAL; INFILTRATION; INTRACAUDAL at 08:31

## 2025-02-07 NOTE — H&P
History and Physical - [facility]  Karly Helton 74 y.o. female MRN: [id number]                  HPI: Karly Helton is a 74 y.o. year old female who presents for prior hx of choledocholithiasis s/p ERCP with stent placement who is here for potential stent removal      REVIEW OF SYSTEMS: Per the HPI, and otherwise unremarkable.    Historical Information   Past Medical History:   Diagnosis Date    Bleeding disorder (HCC) 4/3/2017    Blood clotting disorder (HCC)     History of pulmonary embolus (PE)     Hypertension     Osteoarthritis 7/6/2017     Past Surgical History:   Procedure Laterality Date    CATARACT EXTRACTION W/  INTRAOCULAR LENS IMPLANT      CHOLECYSTECTOMY  2025    HIP SURGERY      ORIF TIBIA & FIBULA FRACTURES Left 04/23/2024    Procedure: OPEN REDUCTION W/ INTERNAL FIXATION (ORIF) ANKLE;  Surgeon: Foreign De Paz;  Location:  MAIN OR;  Service: Orthopedics     Social History   Social History     Substance and Sexual Activity   Alcohol Use Not Currently     Social History     Substance and Sexual Activity   Drug Use Not Currently     Social History     Tobacco Use   Smoking Status Never   Smokeless Tobacco Never     Family History   Problem Relation Age of Onset    Hypertension Father     Cancer Mother        Meds/Allergies       Current Outpatient Medications:     Bioflavonoid Products (Vitamin C) CHEW    Calcium Carb-Cholecalciferol (Calcium 500 + D) 500-5 MG-MCG TABS    Cholecalciferol (Vitamin D3) 25 MCG (1000 UT) capsule    Coenzyme Q10 100 MG TABS    atenolol (TENORMIN) 25 mg tablet    Repatha 140 MG/ML SOSY    Current Facility-Administered Medications:     lactated ringers infusion, 125 mL/hr, Intravenous, Continuous, 125 mL/hr at 02/07/25 0802    Allergies   Allergen Reactions    Atorvastatin Arthralgia, Myalgia and Rash    Ezetimibe-Simvastatin Diarrhea, GI Intolerance and Vomiting       Objective     BP (!) 144/110   Pulse 90   Temp (!) 97.3 °F (36.3 °C) (Temporal)   " Resp 16   Ht 5' 6\" (1.676 m)   Wt 94.3 kg (208 lb)   SpO2 97%   BMI 33.57 kg/m²       PHYSICAL EXAM    Gen: NAD  Head: NCAT  CV: RRR  CHEST: Clear  ABD: soft, NT/ND  EXT: no edema      ASSESSMENT/PLAN:  This is a 74 y.o. year old female here for ercp, and she is stable and optimized for her procedure.        "

## 2025-02-07 NOTE — ANESTHESIA PREPROCEDURE EVALUATION
Procedure:  ERCP    Relevant Problems   ANESTHESIA (within normal limits)      CARDIO   (+) Essential hypertension   (+) Mixed hyperlipidemia      Digestive   (+) Choledocholithiasis      Other   (+) Prothrombin gene mutation (HCC)        Physical Exam    Airway    Mallampati score: II  TM Distance: >3 FB  Neck ROM: full     Dental   No notable dental hx     Cardiovascular  Rhythm: regular, Rate: normal, Cardiovascular exam normal    Pulmonary  Pulmonary exam normal Breath sounds clear to auscultation    Other Findings  post-pubertal.      Anesthesia Plan  ASA Score- 2     Anesthesia Type- general with ASA Monitors.         Additional Monitors:     Airway Plan: ETT.           Plan Factors-    Chart reviewed.   Existing labs reviewed. Patient summary reviewed.                  Induction- intravenous.    Postoperative Plan-     Perioperative Resuscitation Plan - Level 1 - Full Code.       Informed Consent- Anesthetic plan and risks discussed with patient.  I personally reviewed this patient with the CRNA. Discussed and agreed on the Anesthesia Plan with the CRNA..

## 2025-02-07 NOTE — ANESTHESIA POSTPROCEDURE EVALUATION
Post-Op Assessment Note    CV Status:  Stable  Pain Score: 0    Pain management: adequate       Mental Status:  Sleepy and arousable   Hydration Status:  Stable   PONV Controlled:  None   Airway Patency:  Patent  Airway: intubated     Post Op Vitals Reviewed: Yes    No anethesia notable event occurred.    Staff: CRNA       Last Filed PACU Vitals:  Vitals Value Taken Time   Temp     Pulse     BP     Resp     SpO2

## 2025-03-28 NOTE — ASSESSMENT & PLAN NOTE
Infectious Disease Consultation    Reason for Consult: Strep pneumo bacteremia       ASSESSMENT  -Multifocal community-acquired pneumonia secondary to strep pneumo on Rocephin and Azithro  -With secondary strep pneumo bacteremia/invasive strep pneumo infection on ceftriaxone  -Mild leukocytosis and neutrophilia, present on admission, resolved  -Coronary disease, hypertension, dyslipidemia, GERD, A-fib, HFrEF    PLAN  -Continue ceftriaxone 2 g IV daily.  Since clinically improving , will hold on ading vancomycin to cover for potential ceftriaxone resistant strep pneumo  -Obtain repeat blood cultures in a.m. to document clearance  -May dc azithromycin, urine legionella antigen negative   -Anticipating dc on po antibiotics pending sensitivities   -Dw patient and primary team     -------------------------------------------------------------------------------------------------------------------------  HISTORY   Bora Seymour is a 89 year old male who presented on March 27 with 3 weeks history of cough that initially started with flulike symptoms but the cough persisted.  No reported fever or chills.  Denies chest pain or shortness of breath.  In the ED was afebrile, without hypotension or tachycardia.  Saturation 98% on room air.  Labs showed minimally elevated WBC count and neutrophilia.  Chest x-ray showed multifocal pneumonia with straight left pleural effusion.  Started empirically on ceftriaxone and doxycycline.  Admission blood cultures obtained today grew strep pneumo from 2 out of 4 bottles.  He remains afebrile and on room air.  ID consulted for antimicrobial management    PMH:  Past Medical History:   Diagnosis Date    Essential (primary) hypertension     Gastroesophageal reflux disease     High cholesterol     Myocardial infarction  (CMD)      Past Surgical History:   Procedure Laterality Date    Cardiac catherization         SOCIAL HISTORY:  Social History     Tobacco Use    Smoking status: Former      She missed a step while going down her front porch.she had mechanical fall and fell sideways and felt pain in her left ankle.  Unable to stand afterwards despite help.  Patient was complaining of 8 x 10 pain earlier but now it is hard to get better with pain meds.  xray shows trimalleolar fracture of the left ankle.  Consult Ortho  Xarelto on hold.  Anticipate surgery after 48 hours new pain control.  No DVT prophylaxis as Xarelto was previously being used and is currently on hold.   Types: Cigarettes    Smokeless tobacco: Never   Vaping Use    Vaping status: never used   Substance Use Topics    Alcohol use: Yes     Alcohol/week: 1.0 standard drink of alcohol     Types: 1 Glasses of wine per week    Drug use: Never       FAMILY HISTORY:  Family History   Problem Relation Age of Onset    Diabetes Sister     Heart disease Brother        ALLERGIES:  ALLERGIES:  No Known Allergies    HOME MEDICATIONS:  Medications Prior to Admission   Medication Sig Dispense Refill    aspirin (ECOTRIN) 81 MG EC tablet Take 81 mg by mouth daily.      apixaBAN (ELIQUIS) 2.5 MG Tab Take 2.5 mg by mouth every 12 hours.      fluticasone (FLONASE) 50 MCG/ACT nasal spray Spray 2 sprays in each nostril daily as needed.      spironolactone (ALDACTONE) 25 MG tablet Take 12.5 mg by mouth daily.      sacubitril-valsartan (Entresto) 24-26 MG per tablet Take 1 tablet by mouth in the morning and 1 tablet in the evening.      dapagliflozin (Farxiga) 10 MG tablet Take 10 mg by mouth daily.      tamsulosin (FLOMAX) 0.4 MG Cap Take 0.4 mg by mouth daily after a meal.      FAMOTIDINE PO Take 20 mg by mouth in the morning and 20 mg in the evening.      metoPROLOL tartrate (LOPRESSOR) 25 MG tablet Take 25 mg by mouth daily.      atorvastatin (LIPITOR) 80 MG tablet Take 40 mg by mouth daily.         CURRENT MEDICATIONS:  Scheduled Meds:  Current Facility-Administered Medications   Medication Dose Route Frequency Provider Last Rate Last Admin    sodium chloride 0.9 % injection 2 mL  2 mL Intracatheter 2 times per day Marry Juan MBBS   2 mL at 03/28/25 0850    Potassium Standard Replacement Protocol (Levels 3.5 and lower)   Does not apply See Admin Instructions Marry Juan MBBS        Magnesium Standard Replacement Protocol   Does not apply See Admin Instructions Marry Juan MBBS        cefTRIAXone (ROCEPHIN) 2,000 mg in sterile water (preservative free) IV syringe  2,000 mg Intravenous Daily Marry Juan MBBS   2,000 mg at  03/28/25 0850    doxycycline hyclate (VIBRAMYCIN) capsule 100 mg  100 mg Oral 2 times per day Pant, Marry, MBBS   100 mg at 03/28/25 0848    apixaBAN (ELIQUIS) tablet 2.5 mg  2.5 mg Oral 2 times per day Pant, Marry, MBBS   2.5 mg at 03/28/25 0849    aspirin (ECOTRIN) enteric coated tablet 81 mg  81 mg Oral Daily Pant, Marry, MBBS   81 mg at 03/28/25 0848    atorvastatin (LIPITOR) tablet 40 mg  40 mg Oral Daily Amando Solorio,         empagliflozin (JARDIANCE) tablet 10 mg  10 mg Oral Daily Pant, Marry, MBBS   10 mg at 03/28/25 0849    famotidine (PEPCID) tablet 20 mg  20 mg Oral Daily Pant, Marry, MBBS   20 mg at 03/28/25 0942    metoPROLOL tartrate (LOPRESSOR) tablet 25 mg  25 mg Oral Daily Pant, Marry, MBBS   25 mg at 03/28/25 0848    [Held by provider] sacubitril-valsartan (ENTRESTO) 24-26 MG per tablet 1 tablet  1 tablet Oral BID Pant, Marry, MBBS        [Held by provider] spironolactone (ALDACTONE) tablet 12.5 mg  12.5 mg Oral Daily Pant, Marry, MBBS        tamsulosin (FLOMAX) capsule 0.4 mg  0.4 mg Oral Daily PC Pant, Marry, MBBS   0.4 mg at 03/28/25 0848     Continuous Infusions:  Current Facility-Administered Medications   Medication Dose Route Frequency Provider Last Rate Last Admin       ROS  A 10 point review of systems was conducted and is negative except for as listed in the HPI.         Physical Exam    Vitals:    03/28/25 1103   BP: 105/63   Pulse: 64   Resp: 16   Temp: 97.2 °F (36.2 °C)       GENERAL: Normocephalic atraumatic, in NAD  Standing with PT , not ill looking   HEENT: Anicteric sclera  NECK: supple  HEART: RRR, no audible murmur  LUNGS: GBAE, no wheezing . Scattered rhonchi   ABDOMEN: soft ,non distended non tender to palpation  LOWER EXTREMITIES:  no cyanosis  SKIN: no rash  NEURO: AO      Lab Review    Recent Labs     03/27/25  1703 03/28/25  0419   HGB 12.0* 11.2*   HCT 36.3* 33.7*   PLT 97* 90*   WBC 11.4* 10.2   RBC 3.55* 3.28*   .3* 102.7*      Recent Labs     03/27/25  1703 03/28/25  0419   ALBUMIN 2.6* 2.2*   AST 73* 48*   CO2 24 23   CALCIUM 8.1* 7.6*   CREATININE 0.95 0.89   BUN 18 16       Micro reviewed     Radiology reviewed

## (undated) DEVICE — 2.0MM DRILL BIT WITH DEPTH MARK/QC/140MM

## (undated) DEVICE — NEPTUNE E-SEP SMOKE EVACUATION PENCIL, COATED, 70MM BLADE, PUSH BUTTON SWITCH: Brand: NEPTUNE E-SEP

## (undated) DEVICE — GLOVE INDICATOR PI UNDERGLOVE SZ 8 BLUE

## (undated) DEVICE — CAST PADDING 4 IN SYNTHETIC STRL

## (undated) DEVICE — ACE WRAP 4 IN STERILE

## (undated) DEVICE — GLOVE SRG BIOGEL ECLIPSE 7.5

## (undated) DEVICE — PADDING CAST 4 IN  COTTON STRL

## (undated) DEVICE — CHLORAPREP HI-LITE 26ML ORANGE

## (undated) DEVICE — SPLINT COMFORT 4 X 30

## (undated) DEVICE — GLOVE SRG BIOGEL ECLIPSE 8

## (undated) DEVICE — 2.5MM DRILL BIT/QC/GOLD/110MM

## (undated) DEVICE — 1.25MM THREADED GUIDE WIRE 150MM

## (undated) DEVICE — ALL PURPOSE SPONGES,NON-WOVEN, 4 PLY: Brand: CURITY

## (undated) DEVICE — SUT VICRYL 2-0 CP-1 27 IN J266H

## (undated) DEVICE — ACE WRAP 6 IN UNSTERILE

## (undated) DEVICE — BETHLEHEM UNIVERSAL  MIONR EXT: Brand: CARDINAL HEALTH

## (undated) DEVICE — POV-IOD SOLUTION 4OZ BT

## (undated) DEVICE — THREE-QUARTER SHEET: Brand: CONVERTORS

## (undated) DEVICE — INTENDED FOR TISSUE SEPARATION, AND OTHER PROCEDURES THAT REQUIRE A SHARP SURGICAL BLADE TO PUNCTURE OR CUT.: Brand: BARD-PARKER SAFETY BLADES SIZE 15, STERILE

## (undated) DEVICE — GLOVE INDICATOR PI UNDERGLOVE SZ 7.5 BLUE

## (undated) DEVICE — 10FR FRAZIER SUCTION HANDLE: Brand: CARDINAL HEALTH

## (undated) DEVICE — DRAPE C-ARM X-RAY

## (undated) DEVICE — GAUZE SPONGES,16 PLY: Brand: CURITY

## (undated) DEVICE — ACE WRAP 4 IN UNSTERILE

## (undated) DEVICE — GLOVE SRG BIOGEL 8

## (undated) DEVICE — DRAPE STERI 1010 18IN X 12IN

## (undated) DEVICE — OCCLUSIVE GAUZE STRIP,3% BISMUTH TRIBROMOPHENATE IN PETROLATUM BLEND: Brand: XEROFORM

## (undated) DEVICE — PAD CAST 4 IN COTTON NON STERILE

## (undated) DEVICE — ACE WRAP 6 IN STERILE

## (undated) DEVICE — 1.6MM KIRSCHNER WIRE WITH 5MM THREAD-TROCAR POINT 150MM
Type: IMPLANTABLE DEVICE | Site: ANKLE | Status: NON-FUNCTIONAL
Removed: 2024-04-23

## (undated) DEVICE — SUT VICRYL 0 CT-1 27 IN J260H

## (undated) DEVICE — NEEDLE 18 G X 1 1/2 SAFETY

## (undated) DEVICE — BANDAGE, ESMARK LF STR 6"X9' (20/CS): Brand: CYPRESS